# Patient Record
Sex: FEMALE | Race: WHITE | NOT HISPANIC OR LATINO | URBAN - METROPOLITAN AREA
[De-identification: names, ages, dates, MRNs, and addresses within clinical notes are randomized per-mention and may not be internally consistent; named-entity substitution may affect disease eponyms.]

---

## 2017-04-05 ENCOUNTER — APPOINTMENT (OUTPATIENT)
Dept: LAB | Facility: CLINIC | Age: 57
End: 2017-04-05
Payer: COMMERCIAL

## 2017-04-05 ENCOUNTER — TRANSCRIBE ORDERS (OUTPATIENT)
Dept: LAB | Facility: CLINIC | Age: 57
End: 2017-04-05

## 2017-04-05 DIAGNOSIS — N95.9 UNSPECIFIED MENOPAUSAL AND POSTMENOPAUSAL DISORDER: Primary | ICD-10-CM

## 2017-04-05 DIAGNOSIS — N95.9 UNSPECIFIED MENOPAUSAL AND POSTMENOPAUSAL DISORDER: ICD-10-CM

## 2017-04-05 LAB
ESTRADIOL SERPL-MCNC: 11 PG/ML
PROGEST SERPL-MCNC: 0.5 NG/ML
T4 FREE SERPL-MCNC: 1.04 NG/DL (ref 0.76–1.46)
TSH SERPL DL<=0.05 MIU/L-ACNC: 1.82 UIU/ML (ref 0.36–3.74)

## 2017-04-05 PROCEDURE — 84144 ASSAY OF PROGESTERONE: CPT

## 2017-04-05 PROCEDURE — 84402 ASSAY OF FREE TESTOSTERONE: CPT

## 2017-04-05 PROCEDURE — 84403 ASSAY OF TOTAL TESTOSTERONE: CPT

## 2017-04-05 PROCEDURE — 82677 ASSAY OF ESTRIOL: CPT

## 2017-04-05 PROCEDURE — 84439 ASSAY OF FREE THYROXINE: CPT

## 2017-04-05 PROCEDURE — 84443 ASSAY THYROID STIM HORMONE: CPT

## 2017-04-05 PROCEDURE — 36415 COLL VENOUS BLD VENIPUNCTURE: CPT

## 2017-04-05 PROCEDURE — 82670 ASSAY OF TOTAL ESTRADIOL: CPT

## 2017-04-06 LAB
TESTOST FREE SERPL-MCNC: 1.1 PG/ML (ref 0–4.2)
TESTOST SERPL-MCNC: 12 NG/DL (ref 3–41)

## 2017-04-07 LAB — ESTRIOL SERPL-MCNC: <0.1 NG/ML

## 2017-07-03 ENCOUNTER — GENERIC CONVERSION - ENCOUNTER (OUTPATIENT)
Dept: OTHER | Facility: OTHER | Age: 57
End: 2017-07-03

## 2017-07-03 ENCOUNTER — ALLSCRIPTS OFFICE VISIT (OUTPATIENT)
Dept: OTHER | Facility: OTHER | Age: 57
End: 2017-07-03

## 2017-07-10 ENCOUNTER — GENERIC CONVERSION - ENCOUNTER (OUTPATIENT)
Dept: OTHER | Facility: OTHER | Age: 57
End: 2017-07-10

## 2017-07-13 ENCOUNTER — GENERIC CONVERSION - ENCOUNTER (OUTPATIENT)
Dept: OTHER | Facility: OTHER | Age: 57
End: 2017-07-13

## 2017-07-17 ENCOUNTER — APPOINTMENT (OUTPATIENT)
Dept: LAB | Facility: CLINIC | Age: 57
End: 2017-07-17
Payer: COMMERCIAL

## 2017-07-17 DIAGNOSIS — Z00.8 HEALTH EXAMINATION IN POPULATION SURVEY: Primary | ICD-10-CM

## 2017-07-17 LAB
CHOLEST SERPL-MCNC: 225 MG/DL (ref 50–200)
EST. AVERAGE GLUCOSE BLD GHB EST-MCNC: 114 MG/DL
HBA1C MFR BLD: 5.6 % (ref 4.2–6.3)
HDLC SERPL-MCNC: 103 MG/DL (ref 40–60)
LDLC SERPL CALC-MCNC: 112 MG/DL (ref 0–100)
TRIGL SERPL-MCNC: 49 MG/DL

## 2017-07-17 PROCEDURE — 36415 COLL VENOUS BLD VENIPUNCTURE: CPT

## 2017-07-17 PROCEDURE — 83036 HEMOGLOBIN GLYCOSYLATED A1C: CPT

## 2017-07-17 PROCEDURE — 80061 LIPID PANEL: CPT

## 2017-08-04 ENCOUNTER — ALLSCRIPTS OFFICE VISIT (OUTPATIENT)
Dept: OTHER | Facility: OTHER | Age: 57
End: 2017-08-04

## 2017-09-06 ENCOUNTER — ALLSCRIPTS OFFICE VISIT (OUTPATIENT)
Dept: OTHER | Facility: OTHER | Age: 57
End: 2017-09-06

## 2017-09-26 ENCOUNTER — ALLSCRIPTS OFFICE VISIT (OUTPATIENT)
Dept: OTHER | Facility: OTHER | Age: 57
End: 2017-09-26

## 2017-10-10 ENCOUNTER — ALLSCRIPTS OFFICE VISIT (OUTPATIENT)
Dept: OTHER | Facility: OTHER | Age: 57
End: 2017-10-10

## 2017-10-24 ENCOUNTER — ALLSCRIPTS OFFICE VISIT (OUTPATIENT)
Dept: OTHER | Facility: OTHER | Age: 57
End: 2017-10-24

## 2017-11-07 ENCOUNTER — ALLSCRIPTS OFFICE VISIT (OUTPATIENT)
Dept: OTHER | Facility: OTHER | Age: 57
End: 2017-11-07

## 2017-11-21 ENCOUNTER — ALLSCRIPTS OFFICE VISIT (OUTPATIENT)
Dept: OTHER | Facility: OTHER | Age: 57
End: 2017-11-21

## 2018-01-05 ENCOUNTER — ALLSCRIPTS OFFICE VISIT (OUTPATIENT)
Dept: OTHER | Facility: OTHER | Age: 58
End: 2018-01-05

## 2018-01-10 NOTE — PSYCH
Progress Note  Psychotherapy Provided [facility]: Individual Psychotherapy 45 minutes provided today  Goals addressed in session:   Goals: Dealing with stress  D- Timothy Millan stated that she continues to deal with depression and often finds it difficult to function  Processing her emotions and discussing the trigger for the depression  Discussing ways for her to address the triggers and express what she is feeling  Also working on identifying her wants and needs and increasing self care  Giving supportive therapy  A- Progress - Continuing to process her emotions  P-Continue treatment       Pain Scale and Suicide Risk [facility]: Current Pain Assessment: no pain   On a scale of 0 to 10, the patient rates current pain at 0   Behavioral Health Treatment Plan [employer]: Diagnosis and Treatment Plan explained to patient, patient relates understanding diagnosis and is agreeable to Treatment Plan  Assessment    1   MDD (major depressive disorder), recurrent severe, without psychosis (296 33) (F33 2)    Signatures   Electronically signed by : Phani Watson LCSW; Oct 10 2017  2:04PM EST                       (Author)

## 2018-01-10 NOTE — PSYCH
Behavioral Health Outpatient Intake    Referred By: [facility]  Intake Questions: there are no developmental disabilities  the patient does not have a hearing impairment  the patient does not have an ICM or CTT  patient is not taking injectable psychiatric medications  Employment: The patient is employed full time at [employer]  Emergency Contact Information:   Emergency Contact: EVELIN   Relationship to Patient: FRANDY   Previous Psychiatric Treatment: She has not been previously seen by a psychiatrist     She has not been previously seen by a therapist     History: no  service  She has not had combat service  She was not activated into federal active duty as a member of the [employer] or [employer]  Insurance Subscriber: Bonnie Scott   Employer: [employer]   Primary Insurance: [employer]   ID number: [id number]   Other Insurance Information: PT # [id number]         Presenting Problem (in patient's words): DEPRESSION  Accepted as Patient   Crystal Garcia 7/3/17 3PM     Primary Care Physician: DR Astrid Kim   Electronically signed by : Ricardo Rubi, ; Jul  3 2017  9:20AM EST                       (Author)

## 2018-01-10 NOTE — PSYCH
Treatment Plan Tracking    #1 Treatment Plan not completed within required time limits due to: Client did not schedule an appointment within 15 days of initial assessment  , Client presented with emotional/behavioral issues that required clinical intervention            Signatures   Electronically signed by : Johnna Roman LCSW; Sep 26 2017  3:46PM EST                       (Author)

## 2018-01-11 NOTE — PSYCH
Progress Note  Psychotherapy Provided [facility]: Individual Psychotherapy 45 minutes provided today  Goals addressed in session:   Goals: Dealing with stress  D- Earl Franco stated that she has been feeling a large amount of stress due her daughter's recent wedding  She stated that she hadn't been feeling well that day and was also feeling a large amount of anxiety  She developed a migraine which led to her missing the last portion of the reception  Processing her emotions and discussing ways for her to cope with this  Also discussing ways to verbalize her expectations as well as her emotions to others  Giving supportive therapy  A- Progress - Processing her emotions  P-Continue treatment       Pain Scale and Suicide Risk [facility]: Current Pain Assessment: no pain   On a scale of 0 to 10, the patient rates current pain at 0   Behavioral Health Treatment Plan [facility]: Diagnosis and Treatment Plan explained to patient, patient relates understanding diagnosis and is agreeable to Treatment Plan  Assessment    1   MDD (major depressive disorder), recurrent severe, without psychosis (296 33) (F33 2)    Signatures   Electronically signed by : Mariela Guzman LCSW; Sep 26 2017  3:46PM EST                       (Author)

## 2018-01-12 NOTE — PSYCH
Treatment Plan Tracking    #1 Treatment Plan not completed within required time limits due to: Client presented with emotional/behavioral issues that required clinical intervention            Signatures   Electronically signed by : Pee Garcia LCSW; Oct 10 2017  2:04PM EST                       (Author)

## 2018-01-13 NOTE — PSYCH
Psych Med Mgmt    Appearance: was calm and cooperative, adequate hygiene and grooming and good eye contact  Observed mood: depressed and anxious  Observed mood: affect was constricted  Speech: a normal rate and fluent  Thought processes: coherent/organized  Hallucinations: no hallucinations present  Thought Content: no delusions  Abnormal Thoughts: The patient has no suicidal thoughts and no homicidal thoughts  Orientation: The patient is oriented to person, place and time, oriented to person, oriented to place and oriented to time  Recent and Remote Memory: short term memory intact and long term memory intact  Attention Span And Concentration: concentration intact  Insight: Limited insight  Judgment: Her judgment was limited  Muscle Strength And Tone  Muscle strength and tone were normal         Goals addressed in session: Medication Management       Treatment Recommendations: Continue current medications  Risks, Benefits And Possible Side Effects Of Medications: Risks, benefits, and possible side effects of medications explained to patient and patient verbalizes understanding  She reports normal appetite, normal energy level, no weight change and normal number of sleep hours  Patient stated she was able to tolerate Viibryd and feels less anxious and depressed  She stated that is looking forward to her daughter's wedding but also is grieving her as she will be moving out  She is aware she needs to overcome this feelings and that brighter future lies ahead but at the moment it is difficult to focus on that,  She also is left to deal with a lot of the resentment that she has towards her  and past affairs  She is going to meet with [facility] for counseling  Assessment    1  MDD (major depressive disorder), recurrent severe, without psychosis (296 33) (F33 2)   2  Major depressive disorder, single episode, severe without psychotic features (296 23)   (F32 2)    Plan    1  BuPROPion HCl ER (XL) 300 MG Oral Tablet Extended Release 24 Hour; TAKE 1   TABLET DAILY   2  LORazepam 0 5 MG Oral Tablet; TAKE 1 TABLET 3 TIMES DAILY AS NEEDED    3  Viibryd Starter Pack 10 & 20 MG Oral Kit    4  Viibryd 20 MG Oral Tablet; Take 1 tablet daily    Review of Systems    Constitutional: No fever, no chills, feels well, no tiredness, no recent weight gain or loss  Cardiovascular: no complaints of slow or fast heart rate, no chest pain, no palpitations  Respiratory: no complaints of shortness of breath, no wheezing, no dyspnea on exertion  Gastrointestinal: no complaints of abdominal pain, no constipation, no nausea, no diarrhea, no vomiting  Genitourinary: no complaints of dysuria, no incontinence, no pelvic pain, no urinary frequency  Musculoskeletal: no complaints of arthralgia, no myalgias, no limb pain, no joint stiffness  Integumentary: no complaints of skin rash, no itching, no dry skin  Neurological: no complaints of headache, no confusion, no numbness, no dizziness  Substance Abuse Hx    Substance Abuse History: Denies  Active Problems    1  Common migraine without aura (346 10) (G43 009)   2  Depression with anxiety (300 4) (F41 8)   3  Dry eyes, bilateral (375 15) (H04 123)   4  Hypercholesteremia (272 0) (E78 00)   5  Influenza vaccine needed (V04 81) (Z23)   6  Major depressive disorder, single episode, severe without psychotic features (296 23)   (F32 2)   7  MDD (major depressive disorder), recurrent severe, without psychosis (296 33) (F33 2)   8  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)    Past Medical History    The active problems and past medical history were reviewed and updated today  Allergies    1  No Known Drug Allergies    Current Meds   1  BuPROPion HCl ER (XL) 300 MG Oral Tablet Extended Release 24 Hour; TAKE 1   TABLET DAILY; Therapy: 51UIP7740 to (Evaluate:19Oct2017)  Requested for: 32Oew2689; Last   Rx:12Zxt5990 Ordered   2   LORazepam 0 5 MG Oral Tablet; TAKE 1 TABLET 3 TIMES DAILY AS NEEDED; Therapy: 61UIN7877 to (Evaluate:05Oct2017); Last Rx:50Ija2998 Ordered   3  Viibryd Starter Pack 10 & 20 MG Oral Kit; take as directed; Therapy: 21FWB9779 to (Evaluate:51Eaz9594)  Requested for: 71Kcl6948; Last   Rx:58Tbv0361 Ordered    The medication list was reviewed and updated today  Family Psych History  Mother    1  Family history of Benign essential HTN   2  Family history of hypercholesterolemia (V18 19) (Z83 42)   3  History of total knee arthroplasty  Father    4  Family history of Benign essential HTN   5  Family history of BPH (benign prostatic hyperplasia)   6  Family history of hypercholesterolemia (V18 19) (Z83 42)   7  Family history of multiple myeloma (V16 7) (Z80 7)   8  History of total knee arthroplasty    The family history was reviewed and updated today  Social History  The social history was reviewed and updated today  The social history was reviewed and is unchanged  End of Encounter Meds    1  BuPROPion HCl ER (XL) 300 MG Oral Tablet Extended Release 24 Hour; TAKE 1   TABLET DAILY; Therapy: 20ILY2570 to (Maryetta Apley)  Requested for: 18LIY5959; Last   Rx:61Vod0120 Ordered   2  LORazepam 0 5 MG Oral Tablet; TAKE 1 TABLET 3 TIMES DAILY AS NEEDED; Therapy: 30LGO2311 to (Evaluate:05Oct2017); Last Rx:83Nrj6554 Ordered    3  Viibryd 20 MG Oral Tablet; Take 1 tablet daily;    Therapy: 71KZV1561 to [phone])  Requested for: 59HJY1052; Last   Rx:08Cau0958 Ordered    Future Appointments    Date/Time Provider Specialty Site   09/26/2017 10:00 AM Nighat Martin LCSW Psychiatry [facility]   10/10/2017 01:00 PM Yahaira Cloud Psychiatry [facility]   10/24/2017 01:00 PM Nighat Martin LCSW Psychiatry [facility]   11/07/2017 11:00 AM Nighat Martin [facility] Psychiatry [facility]   11/21/2017 11:00 AM Tisha Lawrence Kristen Kelley, [facility] Psychiatry [facility]     Signatures   Electronically signed by : MICHEAL Murdock ; Sep  6 2017  4:19PM EST                       (Author)

## 2018-01-14 NOTE — PSYCH
Treatment Plan Tracking    #1 Treatment Plan not completed within required time limits due to: Client presented with emotional/behavioral issues that required clinical intervention            Signatures   Electronically signed by : Teresa Harrison LCSW; Nov 24 2017 11:19AM EST                       (Author)

## 2018-01-14 NOTE — PSYCH
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)    Current Meds   1  Citalopram Hydrobromide 20 MG Oral Tablet; TAKE 1 TABLET BY MOUTH ONCE DAILY; Therapy: 51VGE8873 to (Last Rx:64Epn2940)  Requested for: 09Odr9677 Ordered   2  Dexilant 60 MG Oral Capsule Delayed Release; take 1 capsule daily; Therapy: 74ZKE7628 to (Fabienne Moore)  Requested for: 85WIO1773; Last   Rx:47Ylv6789 Ordered   3  LORazepam 0 5 MG Oral Tablet; TAKE 1 TABLET 3 TIMES DAILY AS NEEDED; Therapy: 97IRO8790 to (Evaluate:05Oct2017); Last Rx:17Epv3511 Ordered   4  Relpax 40 MG Oral Tablet; TAKE 1 TABLET AT ONSET OF MIGRAINE  MAY REPEAT IN  2   HOURS  MAX 2 DOSES/24 HOURS, NO MORE THAN 3 DAYS PER WEEK ;   Therapy: 94TIA2886 to (Last PJ:50QFE2316)  Requested for: 37Bdp5021 Ordered   5  Restasis 0 05 % Ophthalmic Emulsion; INSTILL 1 DROP IN BOTH EYES EVERY 12   HOURS DAILY; Therapy: 03EFP2800 to (Evaluate:28Apr2017)  Requested for: 67WQC6535; Last   Rx:86Fle6381 Ordered   6  SUMAtriptan Succinate 100 MG Oral Tablet; TAKE 1 TABLET FOR MIGRAINE RELIEF  MAY   REPEAT 2 HOURS LATER  MAXIMUM 200MG/DAY; Therapy: 06BFM3510 to (Last Rx:19Kyi9864)  Requested for: 73Coy2446 Ordered   7  SUMAtriptan Succinate 6 MG/0 5ML Subcutaneous Solution; INJECT 0 5ML AT ONSET   OF HEADACHE  MAY REPEAT IN 2 HOURS  MAXIMUM OF 2 INJECTIONS PER   DAY, NO MORE THAN 3 DAYS PER WEEK; Therapy: 82NCB7240 to (Last Rx:69Rwq8739)  Requested for: 31Dsi3172 Ordered   8  Trintellix 10 MG Oral Tablet; Take 1 tablet daily; Therapy: 55IVA3629 to (Evaluate:30Oct2017)  Requested for: 55ZJG5177; Last   Rx:68Kvd6035; Status: ACTIVE - Retrospective By Protocol Authorization Ordered    Allergies    1   No Known Drug Allergies    Signatures   Electronically signed by : Pete Rodney LCSW; Jul 13 2017 10:58AM EST                       (Author)

## 2018-01-15 NOTE — PSYCH
Treatment Plan Tracking    #1 Treatment Plan not completed within required time limits due to: Client presented with emotional/behavioral issues that required clinical intervention            Signatures   Electronically signed by : Myles Loaiza LCSW; Oct 26 2017  3:56PM EST                       (Author)

## 2018-01-15 NOTE — PSYCH
Progress Note  Psychotherapy Provided [facility]: Individual Psychotherapy 45 minutes provided today  Goals addressed in session:   Goals: Dealing with stress  D- Thelma Carroll stated that she continues to struggle with depression  She shared that she has been feeling overwhelmed with work and her responsibilities at home  Discussing her relationship with her  how the issues between them have affected her  Continuing to explore her individuality as well as role within the relationship  Discussing ways for her to be able to identify her individuality and increase her ability to assert herself within the relationship  Giving supportive therapy  A- PRogress - Continuing to process her emotions  P-Continue treatment       Pain Scale and Suicide Risk [facility]: Current Pain Assessment: no pain   On a scale of 0 to 10, the patient rates current pain at 0   Behavioral Health Treatment Plan [facility]: Diagnosis and Treatment Plan explained to patient, patient relates understanding diagnosis and is agreeable to Treatment Plan  Assessment    1   MDD (major depressive disorder), recurrent severe, without psychosis (296 33) (F33 2)    Signatures   Electronically signed by : Rosa Johnston LCSW; Oct 26 2017  3:56PM EST                       (Author)

## 2018-01-16 NOTE — PSYCH
Assessment    1  Major depressive disorder, single episode, severe without psychotic features (296 23)   (F32 2)    Plan    1  Viibryd Starter Pack 10 & 20 MG Oral Kit; take as directed    Chief Complaint  Referred By PCP      History of Present Illness  61 yo  female with hx MDD for the past 15 years and treated with Citalopram 20 mg daily  This tx worked for a long time but gradually she started becoming depressed (withdrawn,hopeless, anhedonia, helpless,crying spells and sadness) She stated it became harder to function and it she feels now her world is around her job  She has 3 children, one moved to [place] the other is getting  and her son is 22 yo  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    Stopped Citalopram 7/3 Started Trintellix 10 mg 7/4 and she took it for 2 weeks and then switched to Wellbutrin  mg bid and eventually switched to Wellbutrin  mg and she is still depressed and tearful and feels it's hard to breath  She stated her  is not emotionally supportive  She wants to feel better by September 17 for her daughter's wedding  She lacks support as she won't share her feelings with her children and her mother lives downstairs apartment  She has a sister in [employer] [place] and they talked often  She stated nothing marked her first episode but rather years of melancholy  She has been  for 27 years  She stated she had to settle because she was already 34 and all her friends were   She stated 6 months into her marriage she discovered her  was abusing cocaine and risked his medical license  She had fertility problems and she went through treatment and had twin girls  After that she found out  had relapsed on cocaine  Later on she had a son and shortly after she found out her  had an affair and one night stand  Review of Systems  anxiety and interpersonal relationship problems     Constitutional: No fever, no chills, no recent weight gain or recent weight loss  ENT: no ear ache, no loss of hearing, no nosebleeds or nasal discharge, no sore throat or hoarseness  Cardiovascular: no complaints of slow or fast heart rate, no chest pain, no palpitations, no leg claudication or lower extremity edema  Respiratory: no complaints of shortness of breath, no wheezing, no dyspnea on exertion, no orthopnea or PND  Gastrointestinal: no complaints of abdominal pain, no constipation, no nausea or diarrhea, no vomiting, no bloody stools  Genitourinary: no complaints of dysuria, no incontinence, no pelvic pain, no dysmenorrhea, no vaginal discharge or abnormal vaginal bleeding  Musculoskeletal: no complaints of arthralgia, no myalgia, no joint swelling or stiffness, no limb pain or swelling  Integumentary: no complaints of skin rash or lesion, no itching or dry skin, no skin wounds  Neurological: no complaints of headache, no confusion, no numbness or tingling, no dizziness or fainting  ROS reviewed  Substance Abuse Hx    Substance Abuse History: Denies  Active Problems    1  Common migraine without aura (346 10) (G43 009)   2  Depression with anxiety (300 4) (F41 8)   3  Dry eyes, bilateral (375 15) (H04 123)   4  Hypercholesteremia (272 0) (E78 00)   5  Influenza vaccine needed (V04 81) (Z23)   6  Major depressive disorder, single episode, severe without psychotic features (296 23)   (F32 2)   7  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)    Past Medical History    The active problems and past medical history were reviewed and updated today  Surgical History    The surgical history was reviewed and updated today  Allergies    1  No Known Drug Allergies    Current Meds   1  BuPROPion HCl ER (XL) 300 MG Oral Tablet Extended Release 24 Hour; TAKE 1   TABLET DAILY; Therapy: 63UZF5963 to (Evaluate:21Qvi9272)  Requested for: 26Bad4083; Last   Rx:43Mcm9254 Ordered   2   LORazepam 0 5 MG Oral Tablet; TAKE 1 TABLET 3 TIMES DAILY AS NEEDED; Therapy: 62JYG2141 to (Evaluate:80Njr3258); Last Rx:49Erg2640 Ordered   3  Relpax 40 MG Oral Tablet; TAKE 1 TABLET AT ONSET OF MIGRAINE  MAY REPEAT IN  2   HOURS  MAX 2 DOSES/24 HOURS, NO MORE THAN 3 DAYS PER WEEK ;   Therapy: 21IXT9350 to (Last AC:12JTI1995)  Requested for: 87Avb6295 Ordered   4  SUMAtriptan Succinate 100 MG Oral Tablet; TAKE 1 TABLET FOR MIGRAINE RELIEF  MAY   REPEAT 2 HOURS LATER  MAXIMUM 200MG/DAY; Therapy: 96KOX9826 to (Last Rx:86Jlk8565)  Requested for: 36Iix5177 Ordered   5  SUMAtriptan Succinate 6 MG/0 5ML Subcutaneous Solution; INJECT 0 5ML AT ONSET   OF HEADACHE  MAY REPEAT IN 2 HOURS  MAXIMUM OF 2 INJECTIONS PER   DAY, NO MORE THAN 3 DAYS PER WEEK; Therapy: 10UEB2172 to (Last Rx:70Iry4127)  Requested for: 51Pze9353 Ordered    The medication list was reviewed and updated today  Family Psych History  Mother    1  Family history of Benign essential HTN   2  Family history of hypercholesterolemia (V18 19) (Z83 42)   3  History of total knee arthroplasty  Father    4  Family history of Benign essential HTN   5  Family history of BPH (benign prostatic hyperplasia)   6  Family history of hypercholesterolemia (V18 19) (Z83 42)   7  Family history of multiple myeloma (V16 7) (Z80 7)   8  History of total knee arthroplasty  No family history     The family history was reviewed and updated today  Social History  The social history was reviewed and updated today  The social history was reviewed and is unchanged  Worse since children moved out home  Feels she has no purpose  There is no intimacy in her   Physical Exam    Appearance: was calm and cooperative and adequate hygiene and grooming  Observed mood: mood appropriate  Observed mood: affect appropriate  Speech: a normal rate and fluent  Thought processes: coherent/organized  Hallucinations: no hallucinations present  Thought Content: no delusions  Abnormal Thoughts:  The patient has no suicidal thoughts and no homicidal thoughts  Orientation: The patient is oriented to person, place and time, oriented to person, oriented to place and oriented to time  Recent and Remote Memory: short term memory intact and long term memory intact  Attention Span And Concentration: concentration intact  Insight: Limited insight  Judgment: Her judgment was limited  Muscle Strength And Tone  Muscle strength and tone were normal    The patient is experiencing no localized pain  Initial Evaluation provided today  Treatment Recommendations: Continue Bupropion   Continue Lorazepam prn  Start Viibryd since weight is a great concern for her  Risks, Benefits And Possible Side Effects Of Medications: Risks, benefits, and possible side effects of medications explained to patient and patient verbalizes understanding  End of Encounter Meds    1  SUMAtriptan Succinate 100 MG Oral Tablet; TAKE 1 TABLET FOR MIGRAINE RELIEF  MAY   REPEAT 2 HOURS LATER  MAXIMUM 200MG/DAY; Therapy: 90QOA6189 to (Last Rx:52Xqs4427)  Requested for: 28Yhw1386 Ordered   2  SUMAtriptan Succinate 6 MG/0 5ML Subcutaneous Solution; INJECT 0 5ML AT ONSET   OF HEADACHE  MAY REPEAT IN 2 HOURS  MAXIMUM OF 2 INJECTIONS PER   DAY, NO MORE THAN 3 DAYS PER WEEK; Therapy: 70VRO3035 to (Last Rx:12Ahr5129)  Requested for: 73Swx7903 Ordered    3  BuPROPion HCl ER (XL) 300 MG Oral Tablet Extended Release 24 Hour; TAKE 1   TABLET DAILY; Therapy: 76FNI2711 to (Evaluate:19Oct2017)  Requested for: 05Izf0254; Last   Rx:93Iho7458 Ordered   4  LORazepam 0 5 MG Oral Tablet; TAKE 1 TABLET 3 TIMES DAILY AS NEEDED; Therapy: 05SQA0094 to (Evaluate:05Oct2017); Last Rx:19Idn6119 Ordered    5  Relpax 40 MG Oral Tablet (Eletriptan Hydrobromide); TAKE 1 TABLET AT ONSET OF   MIGRAINE  MAY REPEAT IN  2 HOURS  MAX 2 DOSES/24 HOURS, NO MORE THAN 3   DAYS PER WEEK ;   Therapy: 16MPT2962 to (Last DE:86QXK9736)  Requested for: 64Mfc0780 Ordered    6   Viibryd [employer] 10 & 20 MG Oral Kit; take as directed; Therapy: 58HHY5873 to (Evaluate:21Zaa8198)  Requested for: 20Yuv5136; Last   Rx:14Iud5671 Ordered    Future Appointments    Date/Time Provider Specialty Site   08/16/2017 02:00 PM Moshe Nolasco, [facility] Psychiatry [facility]   09/26/2017 10:00 AM Moshe Nolasco, [facility] Psychiatry [facility]   10/10/2017 01:00 PM Moshe Gaurav, [facility] Psychiatry [facility]   10/24/2017 01:00 PM [place] Gaurav, [facility] Psychiatry [facility]   11/07/2017 11:00 AM Moshe Nolasco, [facility] Psychiatry [facility]   11/21/2017 11:00 AM Moshe Deep, [facility] Psychiatry [facility]   09/06/2017 04:00 PM MICHEAL Galvin   Psychiatry [place] [id number]     Signatures   Electronically signed by : MICHEAL Jacobsen ; Aug  4 2017 11:50AM EST                       (Author)

## 2018-01-16 NOTE — PSYCH
Treatment Plan Tracking    #1 Treatment Plan not completed within required time limits due to: Client presented with emotional/behavioral issues that required clinical intervention            Signatures   Electronically signed by : Young Longoria LCSW; Nov 8 2017  3:05PM EST                       (Author)

## 2018-01-17 NOTE — PSYCH
Progress Note  Psychotherapy Provided [facility]: Individual Psychotherapy 45 minutes provided today  Goals addressed in session:   Goals: Dealing with stress  D- Lisa Alexander stated that she is very upset due to the fact that her daughter just got engaged to a man the family dislikes  She shared that she feels that he controls her daughter and has turned her against them  Processing her emotions and discussing how to best navigate the situation  Discussing her options and also ways for her to communicate effectively with her daughter in a non confrontational manner  Giving supportive therapy  A- PRogress - Continuing to process her emotions  P-Continue treatment       Pain Scale and Suicide Risk [facility]: Current Pain Assessment: no pain   On a scale of 0 to 10, the patient rates current pain at 0   Behavioral Health Treatment Plan [facility]: Diagnosis and Treatment Plan explained to patient, patient relates understanding diagnosis and is agreeable to Treatment Plan  Assessment    1   MDD (major depressive disorder), recurrent severe, without psychosis (296 33) (F33 2)    Signatures   Electronically signed by : Vasu Hernandez LCSW; Nov 8 2017  3:05PM EST                       (Author)

## 2018-01-17 NOTE — PSYCH
Progress Note  Psychotherapy Provided [facility]: Individual Psychotherapy 45 minutes provided today  Goals addressed in session:   Goals: Dealing with stress  D- Clotilde Cerna stated that she has been struggling with her daughter's recent engagement due to her disliking her fiance  In addition, she was very tearful in discussing the fact that her  had recently disclosed to their daughter that he had cheated on her and felt that she needed to know this to explain why Clotilde Cerna continues to struggle with depression  Processing her emotions and discussing ways for her to approach thia with her daughter  Also discussing ways for her to deal with her anger towards her   Giving supportive therapy  A- PRogress - Continuing to process her emotions  P-Continue treatment       Pain Scale and Suicide Risk [facility]: Current Pain Assessment: no pain   On a scale of 0 to 10, the patient rates current pain at 0   Behavioral Health Treatment Plan [facility]: Diagnosis and Treatment Plan explained to patient, patient relates understanding diagnosis and is agreeable to Treatment Plan  Assessment    1   MDD (major depressive disorder), recurrent severe, without psychosis (296 33) (F33 2)    Signatures   Electronically signed by : Teresa Harrison LCSW; Nov 24 2017 11:19AM EST                       (Author)

## 2018-01-18 NOTE — PSYCH
History of Present Illness    Pre-morbid level of function and History of Present Illness:  Tammy Woodard is a 62 Y O female referred fro treatment through her  who is [facility] physician for depression  She stated that she has been struggling with depression for many years due to various issues in her life  Reason for evaluation and partial hospitalization as an alternative to inpatient hospitalization: N/A  Previous Psychiatric/psychological treatment/year: Dayron Hernández (), More recent, LCSW in the [facility]  Outpatient and/or Partial and Other [employer] Used (CTT, ICM, VNA): Outpatient: Therapy  Family Constellation (include parents, relationship with each and pertinent Psych/Medical History): Mother: Lives with Tammy Woodard and her    Spouse: Hayden-    Father:    Children: Twin daughters-23   Children: Son -23   The patient relates best to Children  Domestic Violence: No past history of domestic violence  The patient is not currently experiencing domestic violence  There is not suspected domestic violence  There is no history of child abuse  There is no history of sexual abuse  Additional Comments related to family/relationships/peer support: Tammy Woodard reports having been very close to her parents, her father who is now  was a   She has three children, twin daughters and a son  Her mother currently lives with her and her   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[employer], RN  Her highest grade level achieved was  four years of college   history includes N/A  Financial status includes Stable  LEISURE ASSESSMENT (Include past and present hobbies/interests and level of involvement (Ex: Group/Club Affiliations): Enjoys time with her children  Her primary language is  [place]  Ethnic considerations are   Religions affiliations and level of involvement - Restorationism -very involved    Spirituality and michelle have helped her cope with difficult situations in her life  The patient learns best by  listening, demonstration and pictures  SUBSTANCE ABUSE ASSESSMENT: no current substance abuse and no past substance abuse  HEALTH ASSESSMENT: She has not lost 10 lbs or more in the last 6 months without trying  She has not had decreased appetite for 5 days or more  She has not gained 10 lbs or more in the last 6 months without trying  no nausea  no vomiting  no diarrhea  no referral to PCP needed  no referral to nutritionist needed  no pregnancy  She is not receiving prenatal care  not referred to PCP  Current PCP: Her   PCP notified  LEGAL: No Mental Health Advance Directive or Power of  on file  She does not want an information packet about Mental Health Advance Directives  The following ratings are based on my interview(s) with Thor Organ  Risk of Harm to Self:   Demographic risk factors include , alaskan, or native [place]  Historical Risk Factors include: chronic psychiatric problems  Recent Specific Risk Factors include: diagnosis of depression  Risk of Harm to Others:      Review of Systems  depression, interpersonal relationship problems and sleep disturbances, but no anxiety, no euphoria, no emotional lability, no hostility, not suidical, no compulsive behavior, no impulsive behavior, no unusual behavior, no violent behavior, no disturbing or unusual thoughts, feelings, or sensations, no unreasonable or irrational fears, no magical thinking, not having fantasies, no emotional problems/concerns, normal functioning ability, no personality change and no character deficiency  Constitutional: No fever, no chills, feels well, no tiredness, no recent weight gain or weight loss  Eyes: No complaints of eye pain, no red eyes, no eyesight problems, no discharge, no dry eyes, no itching of eyes     ENT: no complaints of earache, no loss of hearing, no nose bleeds, no nasal discharge, no sore throat, no hoarseness  Cardiovascular: No complaints of slow heart rate, no fast heart rate, no chest pain, no palpitations, no leg claudication, no lower extremity edema  Respiratory: No complaints of shortness of breath, no wheezing, no cough, no SOB on exertion, no orthopnea, no PND  Gastrointestinal: No complaints of abdominal pain, no constipation, no nausea or vomiting, no diarrhea, no bloody stools  Genitourinary: No complaints of dysuria, no incontinence, no pelvic pain, no dysmenorrhea, no vaginal discharge or bleeding  Musculoskeletal: No complaints of arthralgias, no myalgias, no joint swelling or stiffness, no limb pain or swelling  Integumentary: No complaints of skin rash or lesions, no itching, no skin wounds, no breast pain or lump  Neurological: No complaints of headache, no confusion, no convulsions, no numbness, no dizziness or fainting, no tingling, no limb weakness, no difficulty walking  Psychiatric: as noted in HPI  Endocrine: No complaints of proptosis, no hot flashes, no muscle weakness, no deepening of the voice, no feelings of weakness  Hematologic/Lymphatic: No complaints of swollen glands, no swollen glands in the neck, does not bleed easily, does not bruise easily  ROS reviewed  Active Problems    1  Common migraine without aura (346 10) (G43 009)   2  Depression with anxiety (300 4) (F41 8)   3  Dry eyes, bilateral (375 15) (H04 123)   4  Hypercholesteremia (272 0) (E78 00)   5  Influenza vaccine needed (V04 81) (Z23)   6  Major depressive disorder, single episode, severe without psychotic features (296 23)   (F32 2)   7  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)    Past Medical History    The active problems and past medical history were reviewed and updated today  Surgical History    The surgical history was reviewed and updated today  Family Psych History  Mother    1  Family history of Benign essential HTN   2   Family history of hypercholesterolemia (V18 19) (Z83 42)   3  History of total knee arthroplasty  Father    4  Family history of Benign essential HTN   5  Family history of BPH (benign prostatic hyperplasia)   6  Family history of hypercholesterolemia (V18 19) (Z83 42)   7  Family history of multiple myeloma (V16 7) (Z80 7)   8  History of total knee arthroplasty    The family history was reviewed and updated today  Social History  The social history was reviewed and updated today  The social history was reviewed and is unchanged  Current Meds   1  Citalopram Hydrobromide 20 MG Oral Tablet; TAKE 1 TABLET BY MOUTH ONCE DAILY; Therapy: 57OEN9497 to (Last Rx:44Dft1277)  Requested for: 79Weg2989 Ordered   2  Dexilant 60 MG Oral Capsule Delayed Release; take 1 capsule daily; Therapy: 69EWY3152 to (Diaz Mejias)  Requested for: 97MMP4441; Last   Rx:76Vgl0189 Ordered   3  Relpax 40 MG Oral Tablet; TAKE 1 TABLET AT ONSET OF MIGRAINE  MAY REPEAT IN  2   HOURS  MAX 2 DOSES/24 HOURS, NO MORE THAN 3 DAYS PER WEEK ;   Therapy: 62BBF2947 to (Last [place]:63ORE2744)  Requested for: 53Rgo3426 Ordered   4  Restasis 0 05 % Ophthalmic Emulsion; INSTILL 1 DROP IN BOTH EYES EVERY 12   HOURS DAILY; Therapy: 53SHH6880 to (Evaluate:28Apr2017)  Requested for: 83MAX7019; Last   Rx:[id number] Ordered   5  SUMAtriptan Succinate 100 MG Oral Tablet; TAKE 1 TABLET FOR MIGRAINE RELIEF  MAY   REPEAT 2 HOURS LATER  MAXIMUM 200MG/DAY; Therapy: 86DEQ8367 to (Last Rx:79Zgg0785)  Requested for: 88Ejv7470 Ordered   6  SUMAtriptan Succinate 6 MG/0 5ML Subcutaneous Solution; INJECT 0 5ML AT ONSET   OF HEADACHE  MAY REPEAT IN 2 HOURS  MAXIMUM OF 2 INJECTIONS PER   DAY, NO MORE THAN 3 DAYS PER WEEK; Therapy: 42AMC8236 to (Last Rx:66Ovm8253)  Requested for: 94Ywc8508 Ordered   7  Trintellix 10 MG Oral Tablet; Take 1 tablet daily;    Therapy: 82ODZ2429 to 24 525349)  Requested for: 90KEK2087; Last   Rx:[id number]; Status: ACTIVE - Retrospective By Protocol Authorization Ordered    The medication list was reviewed and updated today  Allergies    1  No Known Drug Allergies    Physical Exam    Appearance: was calm and cooperative, adequate hygiene and grooming and good eye contact  Observed mood: depressed  Observed mood: affect was tearful  Speech: a normal rate  Thought processes: coherent/organized  Hallucinations: no hallucinations present  Thought Content: no delusions  Abnormal Thoughts: The patient has no suicidal thoughts and no homicidal thoughts  Orientation: The patient is oriented to person, place and time  Recent and Remote Memory: short term memory intact and long term memory intact  Attention Span And Concentration: concentration intact  Insight: Insight intact  Judgment: Her judgment was intact  Muscle Strength And Tone  Muscle strength and tone were normal  Normal gait and station  Fund of knowledge: Patient displays adequate knowledge of current events, adequate fund of knowledge regarding past history and adequate fund of knowledge regarding vocabulary  The patient is experiencing no localized pain  On a scale of 0 - 10 the pain severity is a 0  DSM    Provisional Diagnosis: Major depressive disorder  Deferred  Good health  Moderate    Assessment    1   Major depressive disorder, single episode, severe without psychotic features (296 23)   (F32 2)    Future Appointments    Date/Time Provider Specialty Site   08/16/2017 02:00 PM Trinity Marks [facility]   09/26/2017 10:00 AM Trinity Marks, [facility]   10/10/2017 01:00 PM Trinity Marks, [facility]   10/24/2017 01:00 PM Trinity Marks, [facility]   11/07/2017 11:00 AM Trinity Marks, [facility]   11/21/2017 11:00 AM Jayson Lucas, [facility]     Signatures   Electronically signed by : Uri Tamayo LCSW; Jul 5 2017  5:31PM EST                       (Author)    Electronically signed by : MICHEAL Vieyra ; Jul 6 2017  9:05AM EST                       (Author)

## 2018-01-23 NOTE — PSYCH
Progress Note  Psychotherapy Provided [facility]: Individual Psychotherapy 45 minutes provided today  Goals addressed in session:   Goals: Dealing with stress  Wolf Gonzalez presented as upset and tearful  She stated that things have been very stressful and difficult for her  She stated that her father in law is in the hospital and that her mother in law is staying with them  In addition, her daughter is engaged to a man that they dislike and is adamant about remaining with him  She shared that she feels physically and emotionally overwhelmed and depressed  She denies SI  Processing her emotions and discussing ways for her to be able to practice self care and set boundaries with her family  Also discussing being able to delegate tasks and not take on everything herself  Giving supportive therapy  A- progress - Continuing to process her emotions  P-Continue treatment       Pain Scale and Suicide Risk [facility]: Current Pain Assessment: no pain   On a scale of 0 to 10, the patient rates current pain at 0   Behavioral Health Treatment Plan [facility]: Diagnosis and Treatment Plan explained to patient, patient relates understanding diagnosis and is agreeable to Treatment Plan  Assessment    1   MDD (major depressive disorder), recurrent severe, without psychosis (296 33) (F33 2)    Signatures   Electronically signed by : Mariela Guzman LCSW; Jan 9 2018  8:30AM EST                       (Author)

## 2018-02-12 ENCOUNTER — OFFICE VISIT (OUTPATIENT)
Dept: BEHAVIORAL/MENTAL HEALTH CLINIC | Facility: CLINIC | Age: 58
End: 2018-02-12
Payer: COMMERCIAL

## 2018-02-12 DIAGNOSIS — F33.2 SEVERE RECURRENT MAJOR DEPRESSION WITHOUT PSYCHOTIC FEATURES (HCC): Primary | ICD-10-CM

## 2018-02-12 PROCEDURE — 90834 PSYTX W PT 45 MINUTES: CPT | Performed by: SOCIAL WORKER

## 2018-02-12 NOTE — PSYCH
Psychotherapy Provided: Individual Psychotherapy 45 minutes     Length of time in session: 45 minutes, follow up in 2 week    Goals addressed in session: Goal 1     Pain:      none    0    Current suicide risk : Jeff Nowak stated that things continue to be extremely stressful in her life  She shared that her father in [address] [place] [address] is in a maintenance state with hospice  In addition, her daughter now wants her to plan her wedding  Processing her emotions and discussing ways for her to cope with the stress in her life  Also discussing how to be able to accept other people's choices that she doesn't understand or agree with   Giving supportive there[ay  A- Progress - Continuing to process her emotion  P-Continue treatment    Behavioral Health Treatment Plan [facility]: Diagnosis and Treatment Plan explained to Preston Cerna relates understanding diagnosis and is agreeable to Treatment Plan   Yes

## 2018-02-12 NOTE — PSYCH
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, nurse    Diagnosis:   No diagnosis found  Area of Needs: Stress      Long Term Goal 1: Be able to deal with the stress in my life    Target Date:  Completion Date:          Short Term Objectives for Goal 1: ASet bioundaries with others, BUnderstand that I can't control other people's behaviors or choices and CTake time for myself and don't over-extend myself for others    Long Term Goal 2: N/A    Target Date: N/A  Completion Date: N/A    Short Term Objectives for Goal 2: N/A         Long Term Goal # 3: N/A     Target Date: N/A  Completion Date: TBD    Short Term Objectives for Goal 3: N/A    GOAL 1: Modality: Individual 1-2x per month   Completion Date TBD Person responsible: Josephine    GOAL 2: Modality: Individual 0x per month   Completion Date N/A     GOAL 3: Modality: Individual 0x per month   Completion Date N/A      Behavioral Health Treatment Plan [facility]: Diagnosis and Treatment Plan explained to Juan Ribeiro relates understanding diagnosis and is agreeable to Treatment Plan         Client Comments : Please share your thoughts, feelings, need and/or experiences regarding your treatment plan:       __________________________________________________________________    __________________________________________________________________    __________________________________________________________________    __________________________________________________________________    _______________________________________                Patient signature, Date Time: __________________________________________             Physician cosigner signature, Date, Time: ________________________________

## 2018-02-16 DIAGNOSIS — F32.A DEPRESSION, UNSPECIFIED DEPRESSION TYPE: Primary | ICD-10-CM

## 2018-02-16 DIAGNOSIS — F32.A DEPRESSION, UNSPECIFIED DEPRESSION TYPE: ICD-10-CM

## 2018-02-16 RX ORDER — VILAZODONE HYDROCHLORIDE 10 MG/1
10 TABLET ORAL
Qty: 30 TABLET | Refills: 1 | Status: SHIPPED | OUTPATIENT
Start: 2018-02-16 | End: 2018-05-05 | Stop reason: DRUGHIGH

## 2018-02-16 RX ORDER — VILAZODONE HYDROCHLORIDE 20 MG/1
1 TABLET ORAL DAILY
COMMUNITY
Start: 2017-09-06 | End: 2018-02-16 | Stop reason: SDUPTHER

## 2018-02-16 RX ORDER — VILAZODONE HYDROCHLORIDE 20 MG/1
20 TABLET ORAL DAILY
Qty: 30 TABLET | Refills: 1 | Status: SHIPPED | OUTPATIENT
Start: 2018-02-16 | End: 2018-05-05 | Stop reason: SDUPTHER

## 2018-03-12 ENCOUNTER — OFFICE VISIT (OUTPATIENT)
Dept: BEHAVIORAL/MENTAL HEALTH CLINIC | Facility: CLINIC | Age: 58
End: 2018-03-12
Payer: COMMERCIAL

## 2018-03-12 DIAGNOSIS — F33.2 SEVERE RECURRENT MAJOR DEPRESSION WITHOUT PSYCHOTIC FEATURES (HCC): Primary | ICD-10-CM

## 2018-03-12 PROCEDURE — 90834 PSYTX W PT 45 MINUTES: CPT | Performed by: SOCIAL WORKER

## 2018-03-12 NOTE — PSYCH
Psychotherapy Provided: Individual Psychotherapy 45 minutes     Length of time in session: 45 minutes, follow up in 2 week    Goals addressed in session: Goal 1     Pain:      none    0    Current suicide risk : Low     D- Leopoldo Resendiz stated that she has been struggling with her daughter's engagement  She stated that she wants to get  in June  Leopoldo Figures upset with the short time frame as well as the fact that it will be in [place]  She also continues to dislike her daughter's fiance and is having difficulty being happy about the wedding  Processing her emotions and discussing ways for her to be able to deal with the marriage  Also continuing to discuss ways for her to be able to cope with the stress in her life  Giving supportive therapy  A- Progress - Continuing to work on use of coping mechanisms for stress  P-Continue treatment    [address]: Diagnosis and Treatment Plan explained to Sheryl Kim relates understanding diagnosis and is agreeable to Treatment Plan   Yes

## 2018-03-26 ENCOUNTER — TELEPHONE (OUTPATIENT)
Dept: BEHAVIORAL/MENTAL HEALTH CLINIC | Facility: CLINIC | Age: 58
End: 2018-03-26

## 2018-03-26 NOTE — TELEPHONE ENCOUNTER
Patient called wanting to make an appointment with you but couldn't be scheduled until 07/02  She says she needs to see you sooner and discuss her medications with you  Is there anywhere in your schedule we can fit her in sooner than that?

## 2018-05-05 DIAGNOSIS — F32.A DEPRESSION, UNSPECIFIED DEPRESSION TYPE: ICD-10-CM

## 2018-05-05 RX ORDER — VILAZODONE HYDROCHLORIDE 20 MG/1
20 TABLET ORAL DAILY
Qty: 90 TABLET | Refills: 1 | Status: SHIPPED | OUTPATIENT
Start: 2018-05-05 | End: 2018-07-02 | Stop reason: SDUPTHER

## 2018-05-09 DIAGNOSIS — G43.709 CHRONIC MIGRAINE WITHOUT AURA WITHOUT STATUS MIGRAINOSUS, NOT INTRACTABLE: Primary | ICD-10-CM

## 2018-05-09 RX ORDER — SUMATRIPTAN 100 MG/1
100 TABLET, FILM COATED ORAL AS NEEDED
Qty: 27 TABLET | Refills: 1 | Status: SHIPPED | OUTPATIENT
Start: 2018-05-09 | End: 2018-10-02 | Stop reason: SDUPTHER

## 2018-05-09 RX ORDER — BUPROPION HYDROCHLORIDE 300 MG/1
1 TABLET ORAL DAILY
COMMUNITY
Start: 2017-07-21 | End: 2018-06-15 | Stop reason: SDUPTHER

## 2018-05-09 RX ORDER — LORAZEPAM 0.5 MG/1
1 TABLET ORAL 3 TIMES DAILY PRN
COMMUNITY
Start: 2017-07-07 | End: 2018-07-02 | Stop reason: SDUPTHER

## 2018-05-10 DIAGNOSIS — L03.114 CELLULITIS OF ARM, LEFT: Primary | ICD-10-CM

## 2018-05-10 RX ORDER — SULFAMETHOXAZOLE AND TRIMETHOPRIM 800; 160 MG/1; MG/1
1 TABLET ORAL EVERY 12 HOURS SCHEDULED
Qty: 14 TABLET | Refills: 0 | Status: SHIPPED | OUTPATIENT
Start: 2018-05-10 | End: 2018-05-17

## 2018-05-14 ENCOUNTER — OFFICE VISIT (OUTPATIENT)
Dept: BEHAVIORAL/MENTAL HEALTH CLINIC | Facility: CLINIC | Age: 58
End: 2018-05-14
Payer: COMMERCIAL

## 2018-05-14 DIAGNOSIS — F33.2 MDD (MAJOR DEPRESSIVE DISORDER), RECURRENT SEVERE, WITHOUT PSYCHOSIS (HCC): Primary | ICD-10-CM

## 2018-05-14 PROCEDURE — 90834 PSYTX W PT 45 MINUTES: CPT | Performed by: SOCIAL WORKER

## 2018-05-14 NOTE — PSYCH
Psychotherapy Provided: Individual Psychotherapy 45 minutes     Length of time in session: 45 minutes, follow up in 2 week    Goals addressed in session: Goal 1     Pain:      none    0    Current suicide risk : Jet Wolfe stated that she continues to be upset with her daughters upcoming wedding  She stated that they intensely dislike her fiance and feel that he is manipulating and controlling her  Discussing her feeling regarding the situation as well as ways for her to be nereyda to accept her daughter's choices and be able to maintain a healthy relationship with her  Working on ways for her to cope with her dislike for her new son in law  Giving supportive therapy  A- Progress - Continuing to process her emotions  P-Continue treatment    [address]: Diagnosis and Treatment Plan explained to Caretha Precise relates understanding diagnosis and is agreeable to Treatment Plan   Yes

## 2018-06-13 ENCOUNTER — OFFICE VISIT (OUTPATIENT)
Dept: BEHAVIORAL/MENTAL HEALTH CLINIC | Facility: CLINIC | Age: 58
End: 2018-06-13
Payer: COMMERCIAL

## 2018-06-13 DIAGNOSIS — F33.2 MDD (MAJOR DEPRESSIVE DISORDER), RECURRENT SEVERE, WITHOUT PSYCHOSIS (HCC): Primary | ICD-10-CM

## 2018-06-13 PROCEDURE — 90834 PSYTX W PT 45 MINUTES: CPT | Performed by: SOCIAL WORKER

## 2018-06-13 NOTE — PSYCH
Treatment Plan Tracking    # 2Treatment Plan not completed within required time limits due to: Client presented with emotional/behavorial issues that required clinical intervention  Christy Lira

## 2018-06-13 NOTE — PSYCH
Psychotherapy Provided: Individual Psychotherapy 45 minutes     Length of time in session: 45 minutes, follow up in 1 month    Goals addressed in session: Goal 1     Pain:      none    0    Current suicide risk : Celina Barreto stated that she is extremely upset about her daughter's wedding next week  She stated that she continues to dislike her fiance and feels that she is making a mistake  Discussing the reasons for her dislike of him and the parallels to her own relationship  Discussing the triggers involved and ways for her to be able to accept and respect her daughter's choices  Working on ways for her to cope with her feelings towards her new son in law  Giving supportive therapy  A- Progress - Continuing to process her emotions  P-Continue treatment    [address]: Diagnosis and Treatment Plan explained to Tomer Baig relates understanding diagnosis and is agreeable to Treatment Plan   Yes

## 2018-06-15 DIAGNOSIS — F33.2 MDD (MAJOR DEPRESSIVE DISORDER), RECURRENT SEVERE, WITHOUT PSYCHOSIS (HCC): Primary | ICD-10-CM

## 2018-06-15 RX ORDER — BUPROPION HYDROCHLORIDE 300 MG/1
300 TABLET ORAL DAILY
Qty: 90 TABLET | Refills: 1 | Status: SHIPPED | OUTPATIENT
Start: 2018-06-15 | End: 2018-07-02 | Stop reason: SDUPTHER

## 2018-07-02 ENCOUNTER — OFFICE VISIT (OUTPATIENT)
Dept: PSYCHIATRY | Facility: CLINIC | Age: 58
End: 2018-07-02
Payer: COMMERCIAL

## 2018-07-02 DIAGNOSIS — F32.A DEPRESSION, UNSPECIFIED DEPRESSION TYPE: ICD-10-CM

## 2018-07-02 DIAGNOSIS — F33.2 MDD (MAJOR DEPRESSIVE DISORDER), RECURRENT SEVERE, WITHOUT PSYCHOSIS (HCC): ICD-10-CM

## 2018-07-02 PROCEDURE — 99213 OFFICE O/P EST LOW 20 MIN: CPT | Performed by: PSYCHIATRY & NEUROLOGY

## 2018-07-02 RX ORDER — LORAZEPAM 0.5 MG/1
1 TABLET ORAL 3 TIMES DAILY
COMMUNITY
End: 2018-07-02 | Stop reason: SDUPTHER

## 2018-07-02 RX ORDER — SUMATRIPTAN 100 MG/1
50 TABLET, FILM COATED ORAL DAILY
COMMUNITY
End: 2019-07-30 | Stop reason: SDUPTHER

## 2018-07-02 RX ORDER — ESTRADIOL 0.05 MG/D
1 PATCH TRANSDERMAL WEEKLY
COMMUNITY
End: 2018-10-02 | Stop reason: ALTCHOICE

## 2018-07-02 RX ORDER — LORAZEPAM 0.5 MG/1
0.5 TABLET ORAL 3 TIMES DAILY
Qty: 90 TABLET | Refills: 0 | Status: SHIPPED | OUTPATIENT
Start: 2018-07-02 | End: 2018-11-25 | Stop reason: SDUPTHER

## 2018-07-02 RX ORDER — BUPROPION HYDROCHLORIDE 300 MG/1
300 TABLET ORAL EVERY MORNING
Qty: 90 TABLET | Refills: 0 | Status: SHIPPED | OUTPATIENT
Start: 2018-07-02 | End: 2019-01-08 | Stop reason: SDUPTHER

## 2018-07-02 RX ORDER — VILAZODONE HYDROCHLORIDE 20 MG/1
20 TABLET ORAL DAILY
Qty: 90 TABLET | Refills: 0 | Status: SHIPPED | OUTPATIENT
Start: 2018-07-02 | End: 2018-10-02 | Stop reason: SDUPTHER

## 2018-07-02 NOTE — PSYCH
Subjective: Medication Management      Patient ID: Luis Eduardo Michelle is a 62 y o  female  HPI ROS Appetite Changes and Sleep: normal appetite, normal energy level, no weight change and normal number of sleep hours   Patient stated she has been meeting with her counselor regularly but she still deals with a lot of anxiety and dwelling on the past   Review Of Systems:     Mood Anxiety and Depression   Behavior Impulsive Behavior   Thought Content Disturbing Thoughts, Feelings and Unreasonalbe or Irrational Fears   General Relationship Problems, Emotional Problems, Sleep Disturbances and Decreased Functioning   Personality Normal   Other Psych Symptoms Normal   Constitutional Negative   ENT Negative   Cardiovascular Negative   Respiratory Negative   Gastrointestinal Negative   Genitourinary Negative   Musculoskeletal Negative   Integumentary Negative   Neurological Negative   Endocrine Normal    Other Symptoms Normal              Laboratory Results: No results found for this or any previous visit  Substance Abuse History:  History   Drug use: Unknown       Family Psychiatric History: No family history on file  The following portions of the patient's history were reviewed and updated as appropriate: allergies, current medications, past family history, past medical history, past social history, past surgical history and problem list     Social History     Social History    Marital status: /Civil Union     Spouse name: N/A    Number of children: N/A    Years of education: N/A     Occupational History    Not on file       Social History Main Topics    Smoking status: Not on file    Smokeless tobacco: Not on file    Alcohol use Not on file    Drug use: Unknown    Sexual activity: Not on file     Other Topics Concern    Not on file     Social History Narrative    No narrative on file     Social History     Social History Narrative    No narrative on file       Objective:       Mental status:  Appearance calm and cooperative , adequate hygiene and grooming and good eye contact    Mood depressed and anxious   Affect affect was constricted   Speech a normal rate   Thought Processes coherent/organized and normal thought processes   Hallucinations no hallucinations present    Thought Content no delusions   Abnormal Thoughts no suicidal thoughts  and no homicidal thoughts    Orientation  oriented to person and place and time   Remote Memory short term memory intact and long term memory intact   Attention Span concentration intact   Intellect Appears to be of Average Intelligence   Insight Limited insight   Judgement judgment was limited   Muscle Strength Muscle strength and tone were normal and Normal gait    Language no difficulty naming common objects, no difficulty repeating a phrase  and no difficulty writing a sentence    Fund of Knowledge displays adequate knowledge of current events, adequate fund of knowledge regarding past history and adequate fund of knowledge regarding vocabulary    Pain none   Pain Scale 0       Assessment/Plan:       Diagnoses and all orders for this visit:    MDD (major depressive disorder), recurrent severe, [facility] )  -     buPROPion (WELLBUTRIN XL) 300 mg 24 hr tablet; Take 1 tablet (300 mg total) by mouth every morning  -     LORazepam (ATIVAN) 0 5 mg tablet; Take 1 tablet (0 5 mg total) by mouth 3 (three) times a day    Depression, unspecified depression type  -     vilazodone (VIIBRYD) 20 mg tablet; Take 1 tablet (20 mg total) by mouth daily for 180 days    Other orders  -     estradiol (CLIMARA) 0 05 mg/24 hr; Take 1 patch by mouth once a week  -     SUMAtriptan (IMITREX) 100 mg tablet;  Take 50 mg by mouth Daily            Treatment Recommendations- Risks Benefits      Immediate Medical/Psychiatric/Psychotherapy Treatments and Any Precautions: continue current medications   Risks, Benefits And Possible Side Effects Of Medications:  {PSYCH RISK, BENEFITS AND POSSIBLE SIDE EFFECTS (Optional):51593    Controlled Medication Discussion: Discussed with patient Black Box warning on concurrent use of benzodiazepines and opioid medications including sedation, respiratory depression, coma and death  Patient understands the risk of treatment with benzodiazepines in addition to opioids and wants to continue taking those medications  , Discussed with patient the risks of sedation, respiratory depression, impairment of ability to drive and potential for abuse and addiction related to treatment with benzodiazepine medications  The patient understands risk of treatment with benzodiazepine medications, agrees to not drive if feels impaired and agrees to take medications as prescribed   and The patient has been filling controlled prescriptions on time as prescribed to [facility]  [facility]

## 2018-07-18 DIAGNOSIS — H83.8X9 AUTOIMMUNE DISORDER OF INNER EAR, UNSPECIFIED LATERALITY: Primary | ICD-10-CM

## 2018-07-18 RX ORDER — PREDNISONE 10 MG/1
40 TABLET ORAL DAILY
Qty: 70 TABLET | Refills: 1 | Status: SHIPPED | OUTPATIENT
Start: 2018-07-18 | End: 2018-08-15

## 2018-07-20 ENCOUNTER — APPOINTMENT (OUTPATIENT)
Dept: LAB | Facility: CLINIC | Age: 58
End: 2018-07-20

## 2018-07-20 DIAGNOSIS — Z00.8 HEALTH EXAMINATION IN POPULATION SURVEY: Primary | ICD-10-CM

## 2018-07-20 LAB
CHOLEST SERPL-MCNC: 183 MG/DL (ref 50–200)
EST. AVERAGE GLUCOSE BLD GHB EST-MCNC: 103 MG/DL
HBA1C MFR BLD: 5.2 % (ref 4.2–6.3)
HDLC SERPL-MCNC: 72 MG/DL (ref 40–60)
LDLC SERPL CALC-MCNC: 95 MG/DL (ref 0–100)
NONHDLC SERPL-MCNC: 111 MG/DL
TRIGL SERPL-MCNC: 79 MG/DL

## 2018-07-20 PROCEDURE — 83036 HEMOGLOBIN GLYCOSYLATED A1C: CPT

## 2018-07-20 PROCEDURE — 80061 LIPID PANEL: CPT

## 2018-07-20 PROCEDURE — 36415 COLL VENOUS BLD VENIPUNCTURE: CPT

## 2018-08-14 ENCOUNTER — OFFICE VISIT (OUTPATIENT)
Dept: BEHAVIORAL/MENTAL HEALTH CLINIC | Facility: CLINIC | Age: 58
End: 2018-08-14
Payer: COMMERCIAL

## 2018-08-14 DIAGNOSIS — F33.2 MDD (MAJOR DEPRESSIVE DISORDER), RECURRENT SEVERE, WITHOUT PSYCHOSIS (HCC): Primary | ICD-10-CM

## 2018-08-14 PROCEDURE — 90834 PSYTX W PT 45 MINUTES: CPT | Performed by: SOCIAL WORKER

## 2018-08-14 NOTE — PSYCH
Psychotherapy Provided: Individual Psychotherapy 45 minutes     Length of time in session: 45 minutes, follow up in 1 month    Goals addressed in session: Goal 1     Pain:      none    0    Current suicide risk : Bessy stated that her daughter's wedding was "horrible"  She stated that she cried through the entire ceremony  She also stated that her daughter's new  did not take the lead in planning her celebration for her white coat ceremony  Processing her emotions and discussing ways for her to cope with her daughter's choices even though she disagrees with them  Also discussing how her past is affecting her and ways for her to be able to move forward with her life  Reviewing and revising her treatment plan  Giving supportive therapy  A- Progress - Continuing to process her emotions  P- Continue treatment    Behavioral Health Treatment Plan [facility]: Diagnosis and Treatment Plan explained to Yulissa Hampton relates understanding diagnosis and is agreeable to Treatment Plan   Yes

## 2018-08-14 NOTE — PSYCH
Treatment Plan Tracking    # 2Treatment Plan not completed within required time limits due to: [place] unable to generate typed paper copy due to time constraint  PT will sign at the next session  Belinda Valadez

## 2018-09-19 ENCOUNTER — OFFICE VISIT (OUTPATIENT)
Dept: BEHAVIORAL/MENTAL HEALTH CLINIC | Facility: CLINIC | Age: 58
End: 2018-09-19
Payer: COMMERCIAL

## 2018-09-19 DIAGNOSIS — F33.2 MDD (MAJOR DEPRESSIVE DISORDER), RECURRENT SEVERE, WITHOUT PSYCHOSIS (HCC): Primary | ICD-10-CM

## 2018-09-19 PROCEDURE — 90834 PSYTX W PT 45 MINUTES: CPT | Performed by: SOCIAL WORKER

## 2018-09-19 NOTE — PSYCH
Psychotherapy Provided: Individual Psychotherapy 45 minutes     Length of time in session: 45 minutes, follow up in 1 month    Goals addressed in session: Goal 1     Pain:      none    0    Current suicide risk : Low     D- Jackelyn Magana presented as upset and tearful  She shared that she has been extremely unhappy and depressed  She denies SI  She stated that she is happy when she is at work because she feels supported there  She stated that she feels very lonely and unhappy at home because her children have all moved out and she and her  are not close  She stated that she dies not feel that she confide in him how she really feels because he does not understand  She also feels that he does not understand how his behavior earlier on in their marriage has affected her depression  Processing her emotions and discussing ways for her to start to identify her needs and ways to fulfill them  Also discussing ways for her to begin to communicate her feelings to her   Reviewing and signing her treatment plan  Giving supportive therapy  A- Progress - Continuing to process her emotions  P-Continue treatment    [address]: Diagnosis and Treatment Plan explained to Yulissa Hampton relates understanding diagnosis and is agreeable to Treatment Plan   Yes

## 2018-10-02 ENCOUNTER — OFFICE VISIT (OUTPATIENT)
Dept: PSYCHIATRY | Facility: CLINIC | Age: 58
End: 2018-10-02
Payer: COMMERCIAL

## 2018-10-02 DIAGNOSIS — F32.A DEPRESSION, UNSPECIFIED DEPRESSION TYPE: ICD-10-CM

## 2018-10-02 DIAGNOSIS — F33.2 MDD (MAJOR DEPRESSIVE DISORDER), RECURRENT SEVERE, WITHOUT PSYCHOSIS (HCC): ICD-10-CM

## 2018-10-02 PROCEDURE — 99213 OFFICE O/P EST LOW 20 MIN: CPT | Performed by: PSYCHIATRY & NEUROLOGY

## 2018-10-02 RX ORDER — VILAZODONE HYDROCHLORIDE 10 MG/1
10 TABLET ORAL
Qty: 90 TABLET | Refills: 0 | Status: SHIPPED | OUTPATIENT
Start: 2018-10-02 | End: 2019-01-08 | Stop reason: ALTCHOICE

## 2018-10-02 RX ORDER — VILAZODONE HYDROCHLORIDE 20 MG/1
20 TABLET ORAL DAILY
Qty: 90 TABLET | Refills: 0 | Status: SHIPPED | OUTPATIENT
Start: 2018-10-02 | End: 2019-01-08 | Stop reason: ALTCHOICE

## 2018-10-02 NOTE — PSYCH
Subjective: Medication Management      Patient ID: Jf Corona is a 62 y o  female  HPI ROS Appetite Changes and Sleep: normal appetite, decreased energy, no weight change and normal number of sleep hours   Patient stated she continues to struggle with depressed and anxious mood and the evening she seems to have more anxiety and difficulties managing her symptoms  Review Of Systems:     Mood Anxiety and Depression   Behavior Normal    Thought Content Normal   General Emotional Problems and Decreased Functioning   Personality Normal   Other Psych Symptoms Normal   Constitutional Negative   ENT Negative   Cardiovascular Negative   Respiratory Negative   Gastrointestinal Negative   Genitourinary Negative   Musculoskeletal Negative   Integumentary Negative   Neurological Negative   Endocrine Normal    Other Symptoms Normal              Laboratory Results: No results found for this or any previous visit  Substance Abuse History:  History   Drug use: Unknown       Family Psychiatric History: No family history on file  The following portions of the patient's history were reviewed and updated as appropriate: allergies, current medications, past family history, past medical history, past social history, past surgical history and problem list     Social History     Social History    Marital status: /Civil Union     Spouse name: N/A    Number of children: N/A    Years of education: N/A     Occupational History    Not on file       Social History Main Topics    Smoking status: Not on file    Smokeless tobacco: Not on file    Alcohol use Not on file    Drug use: Unknown    Sexual activity: Not on file     Other Topics Concern    Not on file     Social History Narrative    No narrative on file     Social History     Social History Narrative    No narrative on file       Objective:       Mental status:  Appearance calm and cooperative , adequate hygiene and grooming and good eye contact Mood depressed and anxious   Affect affect was constricted   Speech a normal rate and fluent   Thought Processes coherent/organized and normal thought processes   Hallucinations no hallucinations present    Thought Content no delusions   Abnormal Thoughts no suicidal thoughts  and no homicidal thoughts    Orientation  oriented to person and place and time   Remote Memory short term memory intact and long term memory intact   Attention Span concentration intact   Intellect Appears to be of Average Intelligence   Insight Limited insight   Judgement judgment was limited   Muscle Strength Muscle strength and tone were normal and Normal gait    Language no difficulty naming common objects, no difficulty repeating a phrase  and no difficulty writing a sentence    Fund of Knowledge displays adequate knowledge of current events, adequate fund of knowledge regarding past history and adequate fund of knowledge regarding vocabulary    Pain none   Pain Scale 0       Assessment/Plan:       Diagnoses and all orders for this visit:    MDD (major depressive disorder), recurrent severe, without psychosis ([facility] )  -     vilazodone (VIIBRYD) 10 mg tablet; Take 1 tablet (10 mg total) by mouth daily with breakfast Total daily dose of 30 mg    Depression, unspecified depression type  -     vilazodone (VIIBRYD) 20 mg tablet; Take 1 tablet (20 mg total) by mouth daily for 180 days            Treatment Recommendations- Risks Benefits      Immediate Medical/Psychiatric/Psychotherapy Treatments and Any Precautions: increase Viibryd to 30 mg daily     Risks, Benefits And Possible Side Effects Of Medications:  {PSYCH RISK, BENEFITS AND POSSIBLE SIDE EFFECTS (Optional):50469    Controlled Medication Discussion: Discussed with patient Black Box warning on concurrent use of benzodiazepines and opioid medications including sedation, respiratory depression, coma and death   Patient understands the risk of treatment with benzodiazepines in addition to opioids and wants to continue taking those medications  , Discussed with patient the risks of sedation, respiratory depression, impairment of ability to drive and potential for abuse and addiction related to treatment with benzodiazepine medications  The patient understands risk of treatment with benzodiazepine medications, agrees to not drive if feels impaired and agrees to take medications as prescribed   and The patient has been filling controlled prescriptions on time as prescribed to [facility]

## 2018-10-16 ENCOUNTER — OFFICE VISIT (OUTPATIENT)
Dept: BEHAVIORAL/MENTAL HEALTH CLINIC | Facility: CLINIC | Age: 58
End: 2018-10-16
Payer: COMMERCIAL

## 2018-10-16 DIAGNOSIS — F33.2 MDD (MAJOR DEPRESSIVE DISORDER), RECURRENT SEVERE, WITHOUT PSYCHOSIS (HCC): Primary | ICD-10-CM

## 2018-10-16 PROCEDURE — 90834 PSYTX W PT 45 MINUTES: CPT | Performed by: SOCIAL WORKER

## 2018-10-16 NOTE — PSYCH
Psychotherapy Provided: Individual Psychotherapy 45 minutes     Length of time in session: 45 minutes, follow up in 2 week    Goals addressed in session: Goal 1     Pain:      none    0    Current suicide risk : Grady Sherman stated that she continues to struggle with what she needs in her life  She shared that she did make a list on focused on the fact that she wears a "mask" for others  Discussing why she does this and ways for her to be able to express herself to others  Also discussing starting to focus on herself and her own needs rather than always thinking of others first  Giving supportive therapy  A- Progress - Continuing to process her emotions  P-Continue treatment    [address]: Diagnosis and Treatment Plan explained to Nory Shields relates understanding diagnosis and is agreeable to Treatment Plan   Yes

## 2018-11-25 DIAGNOSIS — F33.2 MDD (MAJOR DEPRESSIVE DISORDER), RECURRENT SEVERE, WITHOUT PSYCHOSIS (HCC): ICD-10-CM

## 2018-11-27 RX ORDER — LORAZEPAM 0.5 MG/1
TABLET ORAL
Qty: 90 TABLET | Refills: 0 | Status: SHIPPED | OUTPATIENT
Start: 2018-11-27 | End: 2019-01-08 | Stop reason: SDUPTHER

## 2018-12-12 ENCOUNTER — OFFICE VISIT (OUTPATIENT)
Dept: BEHAVIORAL/MENTAL HEALTH CLINIC | Facility: CLINIC | Age: 58
End: 2018-12-12
Payer: COMMERCIAL

## 2018-12-12 DIAGNOSIS — F33.2 MDD (MAJOR DEPRESSIVE DISORDER), RECURRENT SEVERE, WITHOUT PSYCHOSIS (HCC): Primary | ICD-10-CM

## 2018-12-12 PROCEDURE — 90834 PSYTX W PT 45 MINUTES: CPT | Performed by: SOCIAL WORKER

## 2018-12-12 NOTE — PSYCH
Treatment Plan Tracking    # 3Treatment Plan not completed within required time limits due to: Treatment plan created verbally  PT will sign at her next session
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
## 2018-12-12 NOTE — PSYCH
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, nurse    Diagnosis:   No diagnosis found  Area of Needs:   Stress    Long Term Goal 1: Be able to deal with the stress in my life       Target Date: 3/12/18  Completion Date: TBD         Short Term Objectives for Goal 1:       Set boundaries with others,       Understand that I can't control other people's behaviors or choices       Take time for myself and don't over-extend myself for others       Be able to talk about what my triggers are from my past and be able to move past it       Be at peace with my daughter's choices and try to support her    GOAL 1: Modality: Individual 1-2x per month   Completion Date TBD and The person(s) responsible for carrying out the plan is  [address] [place] [address] Street: Diagnosis and Treatment Plan explained to Millers Tavernel Kimbrough relates understanding diagnosis and is agreeable to Treatment Plan         Client Comments : Please share your thoughts, feelings, need and/or experiences regarding your treatment plan:       __________________________________________________________________    __________________________________________________________________    __________________________________________________________________    __________________________________________________________________    _______________________________________                Patient signature, Date Time: __________________________________________             Physician cosigner signature, Date, Time: ________________________________

## 2019-01-08 ENCOUNTER — OFFICE VISIT (OUTPATIENT)
Dept: PSYCHIATRY | Facility: CLINIC | Age: 59
End: 2019-01-08
Payer: COMMERCIAL

## 2019-01-08 DIAGNOSIS — F32.A DEPRESSION, UNSPECIFIED DEPRESSION TYPE: ICD-10-CM

## 2019-01-08 DIAGNOSIS — F33.2 MDD (MAJOR DEPRESSIVE DISORDER), RECURRENT SEVERE, WITHOUT PSYCHOSIS (HCC): ICD-10-CM

## 2019-01-08 PROCEDURE — 99213 OFFICE O/P EST LOW 20 MIN: CPT | Performed by: PSYCHIATRY & NEUROLOGY

## 2019-01-08 RX ORDER — LORAZEPAM 0.5 MG/1
0.5 TABLET ORAL 3 TIMES DAILY
Qty: 90 TABLET | Refills: 2 | Status: SHIPPED | OUTPATIENT
Start: 2019-01-08

## 2019-01-08 RX ORDER — BUPROPION HYDROCHLORIDE 300 MG/1
300 TABLET ORAL EVERY MORNING
Qty: 90 TABLET | Refills: 0 | Status: SHIPPED | OUTPATIENT
Start: 2019-01-08

## 2019-01-08 NOTE — PSYCH
Subjective: Medication Management     Patient ID: Katerin Spain is a 62 y o  female  HPI ROS Appetite Changes and Sleep: normal appetite, decreased energy, no weight change and normal number of sleep hours   Patient stated she has been more irritable and she feels very upset and tearful  She stated she has been isolating and avoiding going to [employer]  She stated it has been difficult to function at work  Patient stated she feels she can't talk to her family about the way feels because she doesn't want to burden them  She is scheduled to see her counselor next week  Review Of Systems:     Mood Anxiety and Depression   Behavior Normal    Thought Content Disturbing Thoughts, Feelings and Unreasonalbe or Irrational Fears   General Emotional Problems and Decreased Functioning   Personality Normal   Other Psych Symptoms Normal   Constitutional Negative   ENT Negative   Cardiovascular Negative   Respiratory Negative   Gastrointestinal Negative   Genitourinary Negative   Musculoskeletal Negative   Integumentary Negative   Neurological Negative   Endocrine Normal    Other Symptoms Normal              Laboratory Results: No results found for this or any previous visit  Substance Abuse History:  History   Drug use: Unknown       Family Psychiatric History: No family history on file  The following portions of the patient's history were reviewed and updated as appropriate: allergies, current medications, past family history, past medical history, past social history, past surgical history and problem list     Social History     Social History    Marital status: /Civil Union     Spouse name: N/A    Number of children: N/A    Years of education: N/A     Occupational History    Not on file       Social History Main Topics    Smoking status: Not on file    Smokeless tobacco: Not on file    Alcohol use Not on file    Drug use: Unknown    Sexual activity: Not on file     Other Topics Concern    Not on file     Social History Narrative    No narrative on file     Social History     Social History Narrative    No narrative on file       Objective:       Mental status:  Appearance calm and cooperative , adequate hygiene and grooming and good eye contact    Mood dysphoric and depressed   Affect affect was tearful   Speech a normal rate and fluent   Thought Processes coherent/organized and normal thought processes   Hallucinations no hallucinations present    Thought Content no delusions   Abnormal Thoughts no suicidal thoughts  and no homicidal thoughts    Orientation  oriented to person and place and time   Remote Memory short term memory intact and long term memory intact   Attention Span concentration impaired   Intellect Appears to be of Average Intelligence   Insight Limited insight   Judgement judgment was limited   Muscle Strength Muscle strength and tone were normal and Normal gait    Language no difficulty naming common objects, no difficulty repeating a phrase  and no difficulty writing a sentence    Fund of Knowledge displays adequate knowledge of current events, adequate fund of knowledge regarding past history and adequate fund of knowledge regarding vocabulary    Pain none   Pain Scale 0       Assessment/Plan:       Diagnoses and all orders for this visit:    Depression, unspecified depression type    MDD (major depressive disorder), recurrent severe, without psychosis ([facility] )  -     vortioxetine (TRINTELLIX) 10 MG tablet; Take 1 tablet (10 mg total) by mouth daily  -     LORazepam (ATIVAN) 0 5 mg tablet; Take 1 tablet (0 5 mg total) by mouth 3 (three) times a day  -     buPROPion (WELLBUTRIN XL) 300 mg 24 hr tablet;  Take 1 tablet (300 mg total) by mouth every morning            Treatment Recommendations- Risks Benefits      Immediate Medical/Psychiatric/Psychotherapy Treatments and Any Precautions:d/c viibryd, r/s trintellix and titrate dose as tolerated    Risks, Benefits And Possible Side Effects Of Medications:  {PSYCH RISK, BENEFITS AND POSSIBLE SIDE EFFECTS (Optional):86593    Controlled Medication Discussion: Discussed with patient Black Box warning on concurrent use of benzodiazepines and opioid medications including sedation, respiratory depression, coma and death  Patient understands the risk of treatment with benzodiazepines in addition to opioids and wants to continue taking those medications  , Discussed with patient the risks of sedation, respiratory depression, impairment of ability to drive and potential for abuse and addiction related to treatment with benzodiazepine medications  The patient understands risk of treatment with benzodiazepine medications, agrees to not drive if feels impaired and agrees to take medications as prescribed   and The patient has been filling controlled prescriptions on time as prescribed to [place] [address] program

## 2019-02-20 ENCOUNTER — DOCUMENTATION (OUTPATIENT)
Dept: BEHAVIORAL/MENTAL HEALTH CLINIC | Facility: CLINIC | Age: 59
End: 2019-02-20
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
## 2019-02-26 ENCOUNTER — DOCUMENTATION (OUTPATIENT)
Dept: PSYCHIATRY | Facility: CLINIC | Age: 59
End: 2019-02-26

## 2019-03-19 ENCOUNTER — OFFICE VISIT (OUTPATIENT)
Dept: BEHAVIORAL/MENTAL HEALTH CLINIC | Facility: CLINIC | Age: 59
End: 2019-03-19
Payer: COMMERCIAL

## 2019-03-19 DIAGNOSIS — F33.2 MDD (MAJOR DEPRESSIVE DISORDER), RECURRENT SEVERE, WITHOUT PSYCHOSIS (HCC): Primary | ICD-10-CM

## 2019-03-19 PROCEDURE — 90834 PSYTX W PT 45 MINUTES: CPT | Performed by: SOCIAL WORKER

## 2019-03-19 NOTE — PSYCH
Psychotherapy Provided: Individual Psychotherapy 45 minutes     Length of time in session: 45 minutes, follow up in 1 month    Goals addressed in session: Goal 1     Pain:      none    0    Current suicide risk : Low     DTim Self stated that she has been feeling better  She stated that she [facility] to [facility] in January and is currently finishing up her course of treatment  She stated that she is feeling relief from her symptoms and has been able to function better in her life  She stated that she is currently working on resuming life activities and increasing socialization  Discussing ways for her to be able to move forward with her life and rekindle friendships as well as forming new relationships  Reviewing and signing her treatment plan  Giving supportive therapy  A- Progress - Continuing to process her emotions  P-Continue treatment    [address]: Diagnosis and Treatment Plan explained to Leeroy Kimbrough relates understanding diagnosis and is agreeable to Treatment Plan   Yes

## 2019-05-14 ENCOUNTER — OFFICE VISIT (OUTPATIENT)
Dept: BEHAVIORAL/MENTAL HEALTH CLINIC | Facility: CLINIC | Age: 59
End: 2019-05-14
Payer: COMMERCIAL

## 2019-05-14 DIAGNOSIS — F33.2 MDD (MAJOR DEPRESSIVE DISORDER), RECURRENT SEVERE, WITHOUT PSYCHOSIS (HCC): Primary | ICD-10-CM

## 2019-05-14 PROCEDURE — 90834 PSYTX W PT 45 MINUTES: CPT | Performed by: SOCIAL WORKER

## 2019-07-01 DIAGNOSIS — F33.2 MDD (MAJOR DEPRESSIVE DISORDER), RECURRENT SEVERE, WITHOUT PSYCHOSIS (HCC): Primary | ICD-10-CM

## 2019-07-01 RX ORDER — NORTRIPTYLINE HYDROCHLORIDE 10 MG/1
10 CAPSULE ORAL
COMMUNITY
Start: 2019-04-18

## 2019-07-01 RX ORDER — LIOTHYRONINE SODIUM 25 UG/1
25 TABLET ORAL DAILY
COMMUNITY
Start: 2019-04-01 | End: 2019-07-01 | Stop reason: SDUPTHER

## 2019-07-01 RX ORDER — LIOTHYRONINE SODIUM 25 UG/1
25 TABLET ORAL DAILY
Qty: 90 TABLET | Refills: 1 | Status: SHIPPED | OUTPATIENT
Start: 2019-07-01 | End: 2020-07-15

## 2019-07-01 RX ORDER — ESTRADIOL 0.05 MG/D
1 PATCH TRANSDERMAL WEEKLY
COMMUNITY
End: 2019-07-03 | Stop reason: ALTCHOICE

## 2019-07-03 DIAGNOSIS — F33.2 MDD (MAJOR DEPRESSIVE DISORDER), RECURRENT SEVERE, WITHOUT PSYCHOSIS (HCC): ICD-10-CM

## 2019-07-22 ENCOUNTER — SOCIAL WORK (OUTPATIENT)
Dept: BEHAVIORAL/MENTAL HEALTH CLINIC | Facility: CLINIC | Age: 59
End: 2019-07-22
Payer: COMMERCIAL

## 2019-07-22 DIAGNOSIS — F33.2 MDD (MAJOR DEPRESSIVE DISORDER), RECURRENT SEVERE, WITHOUT PSYCHOSIS (HCC): Primary | ICD-10-CM

## 2019-07-22 PROCEDURE — 90834 PSYTX W PT 45 MINUTES: CPT | Performed by: SOCIAL WORKER

## 2019-07-22 NOTE — PSYCH
Psychotherapy Provided: Individual Psychotherapy 456 minutes     Length of time in session: 45 minutes, follow up in 1 month    Goals addressed in session: Goal 1     Pain:      none    0    Current suicide risk : Low     BINTA- Leno Keon stated that she continues to feel okay  She stated that she has noticed that things that used to be difficult for her emotionally are much less so  She stated that she doesn't feel a connection to her  and that this can be difficult for her  Discussing ways that she feels that they can reconnect as well as ways for her to begin to focus on her own needs  Giving supportive therapy  A- Progress - Continuing to process her emotions  P-Continue treatment    [address]: Diagnosis and Treatment Plan explained to Luismarie Court relates understanding diagnosis and is agreeable to Treatment Plan   Yes

## 2019-07-22 NOTE — PSYCH
Treatment Plan Tracking    # 4 Treatment Plan not completed within required time limits due to: Treatment plan not signed due to technical difficulties

## 2019-07-30 DIAGNOSIS — G43.009 MIGRAINE WITHOUT AURA AND WITHOUT STATUS MIGRAINOSUS, NOT INTRACTABLE: Primary | ICD-10-CM

## 2019-07-30 RX ORDER — SUMATRIPTAN 100 MG/1
100 TABLET, FILM COATED ORAL ONCE AS NEEDED
Qty: 27 TABLET | Refills: 3 | Status: SHIPPED | OUTPATIENT
Start: 2019-07-30 | End: 2019-10-28

## 2019-08-01 DIAGNOSIS — G43.009 MIGRAINE WITHOUT AURA AND WITHOUT STATUS MIGRAINOSUS, NOT INTRACTABLE: Primary | ICD-10-CM

## 2019-08-01 RX ORDER — ELETRIPTAN HYDROBROMIDE 40 MG/1
40 TABLET, FILM COATED ORAL ONCE AS NEEDED
Qty: 6 TABLET | Refills: 0 | Status: SHIPPED | OUTPATIENT
Start: 2019-08-01 | End: 2020-01-16 | Stop reason: ALTCHOICE

## 2019-08-01 RX ORDER — ELETRIPTAN HYDROBROMIDE 40 MG/1
40 TABLET, FILM COATED ORAL ONCE AS NEEDED
Qty: 6 TABLET | Refills: 0 | Status: SHIPPED | OUTPATIENT
Start: 2019-08-01 | End: 2019-08-01 | Stop reason: SDUPTHER

## 2019-09-16 ENCOUNTER — TELEPHONE (OUTPATIENT)
Dept: PSYCHIATRY | Facility: CLINIC | Age: 59
End: 2019-09-16

## 2019-10-24 NOTE — PSYCH
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, nurse       Diagnosis:   No diagnosis found  Area of Needs:   Stress    Long Term Goal 1:  Be able to deal with the stress in my life    Target Date: 11/14/18  Completion Date: TBA         Short Term Objectives for Goal 1:       Set boundaries with others,       Understand that I can't control other people's behaviors or choices       Take time for myself and don't over-extend myself for others       Be able to talk about what my triggers are from my past and be able to move past it      Be at peace with my daughter's choices and try to support her      GOAL 1: Modality: Individual 1-2x per month   Completion Date TBD and The person(s) responsible for carrying out the plan is  [address] [place] [address] Drive: Diagnosis and Treatment Plan explained to Rosie gauthier understanding diagnosis and is agreeable to Treatment Plan         Client Comments : Please share your thoughts, feelings, need and/or experiences regarding your treatment plan:       __________________________________________________________________    __________________________________________________________________    __________________________________________________________________    __________________________________________________________________    _______________________________________                Patient signature, Date Time: __________________________________________             Physician cosigner signature, Date, Time: ________________________________ Aisha Herrera

## 2019-11-19 ENCOUNTER — CLINICAL SUPPORT (OUTPATIENT)
Dept: INTERNAL MEDICINE CLINIC | Facility: CLINIC | Age: 59
End: 2019-11-19
Payer: COMMERCIAL

## 2019-11-19 DIAGNOSIS — Z23 NEED FOR INFLUENZA VACCINATION: Primary | ICD-10-CM

## 2020-01-16 DIAGNOSIS — S05.00XA CORNEAL ABRASION, UNSPECIFIED LATERALITY, INITIAL ENCOUNTER: Primary | ICD-10-CM

## 2020-01-16 RX ORDER — LIOTHYRONINE SODIUM 25 UG/1
1 TABLET ORAL
COMMUNITY
Start: 2020-01-13 | End: 2020-01-16

## 2020-01-16 RX ORDER — KETOROLAC TROMETHAMINE 4 MG/ML
1 SOLUTION/ DROPS OPHTHALMIC 4 TIMES DAILY
Qty: 5 ML | Refills: 1 | Status: SHIPPED | OUTPATIENT
Start: 2020-01-16 | End: 2020-01-31

## 2020-01-16 RX ORDER — SUMATRIPTAN 100 MG/1
100 TABLET, FILM COATED ORAL 2 TIMES DAILY PRN
COMMUNITY
Start: 2019-11-25 | End: 2020-01-16

## 2020-01-31 DIAGNOSIS — M62.838 CERVICAL PARASPINAL MUSCLE SPASM: Primary | ICD-10-CM

## 2020-01-31 RX ORDER — CYCLOBENZAPRINE HCL 10 MG
10 TABLET ORAL 3 TIMES DAILY PRN
Qty: 30 TABLET | Refills: 0 | Status: SHIPPED | OUTPATIENT
Start: 2020-01-31 | End: 2021-02-23 | Stop reason: SDUPTHER

## 2020-02-10 ENCOUNTER — APPOINTMENT (EMERGENCY)
Dept: RADIOLOGY | Facility: HOSPITAL | Age: 60
End: 2020-02-10
Payer: COMMERCIAL

## 2020-02-10 ENCOUNTER — HOSPITAL ENCOUNTER (EMERGENCY)
Facility: HOSPITAL | Age: 60
Discharge: HOME | End: 2020-02-10
Attending: EMERGENCY MEDICINE
Payer: COMMERCIAL

## 2020-02-10 ENCOUNTER — OFFICE VISIT (OUTPATIENT)
Dept: URGENT CARE | Facility: CLINIC | Age: 60
End: 2020-02-10
Payer: COMMERCIAL

## 2020-02-10 VITALS
HEART RATE: 90 BPM | DIASTOLIC BLOOD PRESSURE: 93 MMHG | SYSTOLIC BLOOD PRESSURE: 173 MMHG | OXYGEN SATURATION: 98 % | RESPIRATION RATE: 18 BRPM

## 2020-02-10 VITALS
TEMPERATURE: 98 F | OXYGEN SATURATION: 99 % | HEIGHT: 66 IN | HEART RATE: 103 BPM | BODY MASS INDEX: 22.18 KG/M2 | WEIGHT: 138 LBS | RESPIRATION RATE: 18 BRPM | SYSTOLIC BLOOD PRESSURE: 165 MMHG | DIASTOLIC BLOOD PRESSURE: 89 MMHG

## 2020-02-10 DIAGNOSIS — R20.2 PARESTHESIA OF LEFT ARM: ICD-10-CM

## 2020-02-10 DIAGNOSIS — R07.89 CHEST PRESSURE: Primary | ICD-10-CM

## 2020-02-10 DIAGNOSIS — R07.89 CHEST TIGHTNESS: Primary | ICD-10-CM

## 2020-02-10 DIAGNOSIS — R20.2 PARESTHESIA OF RIGHT ARM: ICD-10-CM

## 2020-02-10 LAB
ALBUMIN SERPL BCP-MCNC: 4.3 G/DL (ref 3.5–5)
ALP SERPL-CCNC: 80 U/L (ref 46–116)
ALT SERPL W P-5'-P-CCNC: 33 U/L (ref 12–78)
ANION GAP SERPL CALCULATED.3IONS-SCNC: 7 MMOL/L (ref 4–13)
APTT PPP: 30 SECONDS (ref 23–37)
AST SERPL W P-5'-P-CCNC: 22 U/L (ref 5–45)
BASOPHILS # BLD AUTO: 0.02 THOUSANDS/ΜL (ref 0–0.1)
BASOPHILS NFR BLD AUTO: 1 % (ref 0–1)
BILIRUB SERPL-MCNC: 0.57 MG/DL (ref 0.2–1)
BUN SERPL-MCNC: 14 MG/DL (ref 5–25)
CALCIUM SERPL-MCNC: 10.1 MG/DL (ref 8.3–10.1)
CHLORIDE SERPL-SCNC: 100 MMOL/L (ref 100–108)
CO2 SERPL-SCNC: 30 MMOL/L (ref 21–32)
CREAT SERPL-MCNC: 0.7 MG/DL (ref 0.6–1.3)
D DIMER PPP FEU-MCNC: <0.27 UG/ML FEU
EOSINOPHIL # BLD AUTO: 0.05 THOUSAND/ΜL (ref 0–0.61)
EOSINOPHIL NFR BLD AUTO: 1 % (ref 0–6)
ERYTHROCYTE [DISTWIDTH] IN BLOOD BY AUTOMATED COUNT: 12.2 % (ref 11.6–15.1)
GFR SERPL CREATININE-BSD FRML MDRD: 95 ML/MIN/1.73SQ M
GLUCOSE SERPL-MCNC: 93 MG/DL (ref 65–140)
HCT VFR BLD AUTO: 39.7 % (ref 34.8–46.1)
HGB BLD-MCNC: 13 G/DL (ref 11.5–15.4)
IMM GRANULOCYTES # BLD AUTO: 0.01 THOUSAND/UL (ref 0–0.2)
IMM GRANULOCYTES NFR BLD AUTO: 0 % (ref 0–2)
INR PPP: 0.97 (ref 0.84–1.19)
LYMPHOCYTES # BLD AUTO: 1.34 THOUSANDS/ΜL (ref 0.6–4.47)
LYMPHOCYTES NFR BLD AUTO: 30 % (ref 14–44)
MAGNESIUM SERPL-MCNC: 2.1 MG/DL (ref 1.6–2.6)
MCH RBC QN AUTO: 29.5 PG (ref 26.8–34.3)
MCHC RBC AUTO-ENTMCNC: 32.7 G/DL (ref 31.4–37.4)
MCV RBC AUTO: 90 FL (ref 82–98)
MONOCYTES # BLD AUTO: 0.36 THOUSAND/ΜL (ref 0.17–1.22)
MONOCYTES NFR BLD AUTO: 8 % (ref 4–12)
NEUTROPHILS # BLD AUTO: 2.66 THOUSANDS/ΜL (ref 1.85–7.62)
NEUTS SEG NFR BLD AUTO: 60 % (ref 43–75)
NRBC BLD AUTO-RTO: 0 /100 WBCS
PLATELET # BLD AUTO: 170 THOUSANDS/UL (ref 149–390)
PMV BLD AUTO: 9.1 FL (ref 8.9–12.7)
POTASSIUM SERPL-SCNC: 4.1 MMOL/L (ref 3.5–5.3)
PROT SERPL-MCNC: 8 G/DL (ref 6.4–8.2)
PROTHROMBIN TIME: 12.3 SECONDS (ref 11.6–14.5)
RBC # BLD AUTO: 4.41 MILLION/UL (ref 3.81–5.12)
SODIUM SERPL-SCNC: 137 MMOL/L (ref 136–145)
TROPONIN I SERPL-MCNC: <0.02 NG/ML
TSH SERPL DL<=0.05 MIU/L-ACNC: 2.61 UIU/ML (ref 0.36–3.74)
WBC # BLD AUTO: 4.44 THOUSAND/UL (ref 4.31–10.16)

## 2020-02-10 PROCEDURE — 85025 COMPLETE CBC W/AUTO DIFF WBC: CPT | Performed by: EMERGENCY MEDICINE

## 2020-02-10 PROCEDURE — 84484 ASSAY OF TROPONIN QUANT: CPT | Performed by: EMERGENCY MEDICINE

## 2020-02-10 PROCEDURE — 99285 EMERGENCY DEPT VISIT HI MDM: CPT

## 2020-02-10 PROCEDURE — 85730 THROMBOPLASTIN TIME PARTIAL: CPT | Performed by: EMERGENCY MEDICINE

## 2020-02-10 PROCEDURE — 93005 ELECTROCARDIOGRAM TRACING: CPT

## 2020-02-10 PROCEDURE — 36415 COLL VENOUS BLD VENIPUNCTURE: CPT | Performed by: EMERGENCY MEDICINE

## 2020-02-10 PROCEDURE — 80053 COMPREHEN METABOLIC PANEL: CPT | Performed by: EMERGENCY MEDICINE

## 2020-02-10 PROCEDURE — 99285 EMERGENCY DEPT VISIT HI MDM: CPT | Performed by: EMERGENCY MEDICINE

## 2020-02-10 PROCEDURE — 85379 FIBRIN DEGRADATION QUANT: CPT | Performed by: EMERGENCY MEDICINE

## 2020-02-10 PROCEDURE — 85610 PROTHROMBIN TIME: CPT | Performed by: EMERGENCY MEDICINE

## 2020-02-10 PROCEDURE — 99213 OFFICE O/P EST LOW 20 MIN: CPT | Performed by: NURSE PRACTITIONER

## 2020-02-10 PROCEDURE — 84443 ASSAY THYROID STIM HORMONE: CPT | Performed by: EMERGENCY MEDICINE

## 2020-02-10 PROCEDURE — 83735 ASSAY OF MAGNESIUM: CPT | Performed by: EMERGENCY MEDICINE

## 2020-02-10 PROCEDURE — 71046 X-RAY EXAM CHEST 2 VIEWS: CPT

## 2020-02-11 LAB
ATRIAL RATE: 83 BPM
P AXIS: 71 DEGREES
PR INTERVAL: 114 MS
QRS AXIS: 45 DEGREES
QRSD INTERVAL: 102 MS
QT INTERVAL: 348 MS
QTC INTERVAL: 408 MS
T WAVE AXIS: 43 DEGREES
VENTRICULAR RATE: 83 BPM

## 2020-02-11 PROCEDURE — 93010 ELECTROCARDIOGRAM REPORT: CPT | Performed by: INTERNAL MEDICINE

## 2020-02-11 NOTE — PATIENT INSTRUCTIONS
Today you were evaluated, and based on your symptoms, clinical presentation, and testing, my recommendation it that you go directly to the emergency room  As an urgent care we are limited to the studies and diagnostic testing we can perform, and your current condition requires a higher level of care  We can either call an ambulance for you, or you can go with a family member  If you are alone my preference would be for you to go by ambulance

## 2020-02-11 NOTE — PROGRESS NOTES
[facility]        NAME: Khadra Mathis is a 61 y o  female  : 1960    MRN: [id number]  DATE: February 10, 2020  TIME: 7:40 PM    Assessment and Plan   Chest tightness [R07 89]  1  Chest tightness  Transfer to other facility    ECG 12 lead         Patient Instructions     Today you were evaluated, and based on your symptoms, clinical presentation, and testing, my recommendation it that you go directly to the emergency room  As an urgent care we are limited to the studies and diagnostic testing we can perform, and your current condition requires a higher level of care  We can either call an ambulance for you, or you can go with a family member  If you are alone my preference would be for you to go by ambulance  Follow up with PCP in 3-5 days  Proceed to  ER if symptoms worsen  Chief Complaint     Chief Complaint   Patient presents with    Arm Pain     had 2 episodes of chest heaviness with tingling down both arms, 1st episodie was at 10am while making the bed  pt sts she had a strange feeling, lightheaded and nausea, sat down for approx 30 mins, 2nd episode about 12n, attempted to iron and had the same feeling  continues to feel poorly, admits to tingling and squeezing in both arms  patinet did feel like she had indigestion and took something for that as well as a baby asa  Denies chest pain, sob, visula disturbance, weakness, grasp or gait issues,          History of Present Illness       62 y/o female presents with 2 episodes of chest pressure, lightheaded, bilateral are squeezing/tingling, and nausea  The first episode began at 10:00 am when she was making the bed  At that time she took an antacid, thinking that the symptoms were related to GERD, and rested  The feeling never fully resolved  Around 12:00 noon she attempted to iron, she still did not feel well  She continued with tingling and squeezing in the arms and chest tightness  She did take an aspirin    She denies any diaphoresis, chest pain, palpitations, weakness, visual disturbances, mental status changes  There is no known cardia history  Review of Systems   Review of Systems   Constitutional: Negative for fever  Respiratory: Positive for chest tightness  Cardiovascular: Negative for palpitations  Gastrointestinal: Positive for nausea  Skin: Positive for color change and pallor  Neurological: Negative for dizziness, weakness, numbness and headaches  Positive for bilateral arm tingling           Current Medications       Current Outpatient Medications:     buPROPion (WELLBUTRIN XL) 300 mg 24 hr tablet, Take 1 tablet (300 mg total) by mouth every morning, Disp: 90 tablet, Rfl: 0    liothyronine (CYTOMEL) 25 mcg tablet, Take 1 tablet (25 mcg total) by mouth daily for 180 days, Disp: 90 tablet, Rfl: 1    nortriptyline (PAMELOR) 10 mg capsule, Take 10 mg by mouth daily at bedtime, Disp: , Rfl:     vortioxetine (TRINTELLIX) 10 MG tablet, Take 1 tablet (10 mg total) by mouth daily, Disp: 30 tablet, Rfl: 0    cyclobenzaprine (FLEXERIL) 10 mg tablet, Take 1 tablet (10 mg total) by mouth 3 (three) times a day as needed for muscle spasms (Patient not taking: Reported on 2/10/2020), Disp: 30 tablet, Rfl: 0    LORazepam (ATIVAN) 0 5 mg tablet, Take 1 tablet (0 5 mg total) by mouth 3 (three) times a day, Disp: 90 tablet, Rfl: 2    SUMAtriptan (IMITREX) 100 mg tablet, Take 1 tablet (100 mg total) by mouth once as needed for migraine for up to 90 days, Disp: 27 tablet, Rfl: 3    Current Allergies     Allergies as of 02/10/2020 - Reviewed 02/10/2020   Allergen Reaction Noted    Other              The following portions of the patient's history were reviewed and updated as appropriate: allergies, current medications, past family history, past medical history, past social history, past surgical history and problem list      Past Medical History:   Diagnosis Date    Depression        Past Surgical History: Procedure Laterality Date    HYSTERECTOMY         History reviewed  No pertinent family history  Medications have been verified  Objective   /89   Pulse 103   Temp 98 °F (36 7 °C)   Resp 18   Ht 5' 6" (1 676 m)   Wt 62 6 kg (138 lb)   SpO2 99%   BMI 22 27 kg/m²        Physical Exam     Physical Exam   Constitutional: She appears well-developed and well-nourished  No distress  Neck: Normal carotid pulses present  Carotid bruit is not present  Cardiovascular: Regular rhythm and normal heart sounds  Tachycardia present  Pulmonary/Chest: Effort normal and breath sounds normal    Abdominal: Normal appearance  There is no tenderness  Skin: Skin is warm and dry  Rash noted  Flushness of the skin began on the anterior chest wall and through exam traveled op to anterior neck  No hives present  Psychiatric: She has a normal mood and affect  Her speech is normal and behavior is normal    Nursing note and vitals reviewed

## 2020-02-11 NOTE — ED PROVIDER NOTES
History  Chief Complaint   Patient presents with    Chest Pain     patient sent from urgent care after 2 episodes of chest tightness, and bilateral arm squeezing and dizziness today  took a 325 mg aspirin        History provided by:  Patient   used: No    68-year-old female with history of depression only presented for evaluation after having chest pressure earlier this morning followed by a paresthesias in both arms  Symptoms have improved  She denies any current pressure in the chest but states she has a funny feeling  She states her finger still feels a little bit tingly but significantly improved from earlier  Also reports that prior to those symptoms she noticed a bluish/purple discoloration of both hands but was asymptomatic  This also resolved spontaneously  No neck pain or radicular symptoms  On exam here she is somewhat hypertensive but otherwise normal vitals  Heart and lung sounds normal   Normal strength and sensation of the extremities  Differential diagnosis includes ACS, PE, anxiety/panic attack, muscular strain  Plan EKG, chest x-ray and labs and will re-evaluate  Prior to Admission Medications   Prescriptions Last Dose Informant Patient Reported? Taking?    LORazepam (ATIVAN) 0 5 mg tablet   No No   Sig: Take 1 tablet (0 5 mg total) by mouth 3 (three) times a day   SUMAtriptan (IMITREX) 100 mg tablet   No No   Sig: Take 1 tablet (100 mg total) by mouth once as needed for migraine for up to 90 days   buPROPion (WELLBUTRIN XL) 300 mg 24 hr tablet   No No   Sig: Take 1 tablet (300 mg total) by mouth every morning   cyclobenzaprine (FLEXERIL) 10 mg tablet   No No   Sig: Take 1 tablet (10 mg total) by mouth 3 (three) times a day as needed for muscle spasms   Patient not taking: Reported on 2/10/2020   liothyronine (CYTOMEL) 25 mcg tablet   No No   Sig: Take 1 tablet (25 mcg total) by mouth daily for 180 days   nortriptyline (PAMELOR) 10 mg capsule   Yes No   Sig: Take 10 mg by mouth daily at bedtime   vortioxetine (TRINTELLIX) 10 MG tablet   No No   Sig: Take 1 tablet (10 mg total) by mouth daily      Facility-Administered Medications: None       Past Medical History:   Diagnosis Date    Depression        Past Surgical History:   Procedure Laterality Date    HYSTERECTOMY         History reviewed  No pertinent family history  I have reviewed and agree with the history as documented  Social History     Tobacco Use    Smoking status: Never Smoker    Smokeless tobacco: Never Used   Substance Use Topics    Alcohol use: Never     Frequency: Never    Drug use: Never        Review of Systems   Constitutional: Negative for activity change, appetite change, diaphoresis and fatigue  Respiratory: Negative for chest tightness and shortness of breath  Cardiovascular: Negative for palpitations and leg swelling  Chest pressure  Gastrointestinal: Negative for abdominal pain, nausea and vomiting  Genitourinary: Negative for difficulty urinating  Musculoskeletal: Negative for back pain, gait problem and neck pain  Skin: Positive for color change  Negative for wound  Neurological: Positive for numbness  Negative for dizziness, weakness, light-headedness and headaches  All other systems reviewed and are negative  Physical Exam  Physical Exam   Constitutional: She is oriented to person, place, and time  She appears well-developed and well-nourished  She does not appear ill  Neck: Normal range of motion  Neck supple  No JVD present  Cardiovascular: Normal rate, regular rhythm, intact distal pulses and normal pulses  No murmur heard  Pulmonary/Chest: Effort normal and breath sounds normal    Abdominal: Soft  She exhibits no distension  There is no tenderness  Musculoskeletal:        Right lower leg: She exhibits no tenderness and no edema  Left lower leg: She exhibits no tenderness and no edema     Neurological: She is alert and oriented to person, place, and time  Normal strength, sensation of the extremities  Skin: Skin is warm and dry  No ecchymosis and no rash noted  No cyanosis or erythema  Psychiatric: She has a normal mood and affect  Her behavior is normal    Nursing note and vitals reviewed  Vital Signs  ED Triage Vitals [02/10/20 2014]   Temp Pulse Respirations Blood Pressure SpO2   -- 93 18 (!) 173/93 96 %      Temp src Heart Rate Source Patient Position - Orthostatic VS BP Location FiO2 (%)   -- Monitor Sitting Right arm --      Pain Score       --           Vitals:    02/10/20 2014 02/10/20 2145   BP: (!) 173/93    Pulse: 93 90   Patient Position - Orthostatic VS: Sitting          Visual Acuity      ED Medications  Medications - No data to display    Diagnostic Studies  Results Reviewed     Procedure Component Value Units Date/Time    TSH [[id number]]  (Normal) Collected:  02/10/20 2057    Lab Status:  Final result Specimen:  Blood from Arm, Left Updated:  02/10/20 2135     TSH 3RD GENERATON 2 613 uIU/mL     Narrative:       Patients undergoing fluorescein dye angiography may retain small amounts of fluorescein in the body for 48-72 hours post procedure  Samples containing fluorescein can produce falsely depressed TSH values  If the patient had this procedure,a specimen should be resubmitted post fluorescein clearance        Magnesium [[id number]]  (Normal) Collected:  02/10/20 2057    Lab Status:  Final result Specimen:  Blood from Arm, Left Updated:  02/10/20 2135     Magnesium 2 1 mg/dL     Troponin I [[id number]]  (Normal) Collected:  02/10/20 2057    Lab Status:  Final result Specimen:  Blood from Arm, Left Updated:  02/10/20 2127     Troponin I <0 02 ng/mL     Comprehensive metabolic panel [[id number]] Collected:  02/10/20 2057    Lab Status:  Final result Specimen:  Blood from Arm, Left Updated:  02/10/20 2126     Sodium 137 mmol/L      Potassium 4 1 mmol/L      Chloride 100 mmol/L      CO2 30 mmol/L      ANION GAP 7 mmol/L      BUN 14 mg/dL      Creatinine 0 70 mg/dL      Glucose 93 mg/dL      Calcium 10 1 mg/dL      AST 22 U/L      ALT 33 U/L      Alkaline Phosphatase 80 U/L      Total Protein 8 0 g/dL      Albumin 4 3 g/dL      Total Bilirubin 0 57 mg/dL      eGFR 95 ml/min/1 73sq m     Narrative:       National Kidney Disease Foundation guidelines for Chronic Kidney Disease (CKD):     Stage 1 with normal or high GFR (GFR > 90 mL/min/1 73 square meters)    Stage 2 Mild CKD (GFR = 60-89 mL/min/1 73 square meters)    Stage 3A Moderate CKD (GFR = 45-59 mL/min/1 73 square meters)    Stage 3B Moderate CKD (GFR = 30-44 mL/min/1 73 square meters)    Stage 4 Severe CKD (GFR = 15-29 mL/min/1 73 square meters)    Stage 5 End Stage CKD (GFR <15 mL/min/1 73 square meters)  Note: GFR calculation is accurate only with a steady state creatinine    D-Dimer [[id number]]  (Normal) Collected:  02/10/20 2057    Lab Status:  Final result Specimen:  Blood from Arm, Left Updated:  02/10/20 2120     D-Dimer, Quant <0 27 ug/ml FEU     Protime-INR [[id number]]  (Normal) Collected:  02/10/20 2057    Lab Status:  Final result Specimen:  Blood from Arm, Left Updated:  02/10/20 2116     Protime 12 3 seconds      INR 0 97    APTT [[id number]]  (Normal) Collected:  02/10/20 2057    Lab Status:  Final result Specimen:  Blood from Arm, Left Updated:  02/10/20 2116     PTT 30 seconds     CBC and differential [[id number]] Collected:  02/10/20 2057    Lab Status:  Final result Specimen:  Blood from Arm, Left Updated:  02/10/20 2104     WBC 4 44 Thousand/uL      RBC 4 41 Million/uL      Hemoglobin 13 0 g/dL      Hematocrit 39 7 %      MCV 90 fL      MCH 29 5 pg      MCHC 32 7 g/dL      RDW 12 2 %      MPV 9 1 fL      Platelets 591 Thousands/uL      nRBC 0 /100 WBCs      Neutrophils Relative 60 %      Immat GRANS % 0 %      Lymphocytes Relative 30 %      Monocytes Relative 8 %      Eosinophils Relative 1 %      Basophils Relative 1 %      Neutrophils Absolute 2 66 Thousands/µL Immature Grans Absolute 0 01 Thousand/uL      Lymphocytes Absolute 1 34 Thousands/µL      Monocytes Absolute 0 36 Thousand/µL      Eosinophils Absolute 0 05 Thousand/µL      Basophils Absolute 0 02 Thousands/µL                  XR chest 2 views   ED Interpretation by Darron Bennett MD (02/10 2135)   Normal       Final Result by Porfirio Restrepo MD (02/11 5237)      No acute cardiopulmonary disease  Workstation performed: [id number]                    Procedures  ECG 12 Lead Documentation Only  Date/Time: 2/10/2020 9:20 PM  Performed by: Darron Bennett MD  Authorized by: Darron Bennett MD     Indications / Diagnosis:  ACS  ECG reviewed by me, the ED Provider: yes    Patient location:  ED  Previous ECG:     Previous ECG:  Compared to current    Comparison ECG info:   Today    Similarity:  No change  Rate:     ECG rate:  83  Rhythm:     Rhythm: sinus rhythm    Ectopy:     Ectopy: none    QRS:     QRS axis:  Normal  Conduction:     Conduction: normal    ST segments:     ST segments:  Normal  T waves:     T waves: normal               ED Course         HEART Risk Score      Most Recent Value   History  0 Filed at: 02/10/2020 2139   ECG  0 Filed at: 02/10/2020 2139   Age  1 Filed at: 02/10/2020 2139   Risk Factors  0 Filed at: 02/10/2020 2139   Troponin  0 Filed at: 02/10/2020 2139   Heart Score Risk Calculator   History  0 Filed at: 02/10/2020 2139   ECG  0 Filed at: 02/10/2020 2139   Age  1 Filed at: 02/10/2020 2139   Risk Factors  0 Filed at: 02/10/2020 2139   Troponin  0 Filed at: 02/10/2020 2139   HEART Score  1 Filed at: 02/10/2020 2139   HEART Score  1 Filed at: 02/10/2020 2139                            MDM  Number of Diagnoses or Management Options  Chest pressure: new and requires workup  Paresthesia of left arm: new and requires workup  Paresthesia of right arm: new and requires workup  Diagnosis management comments: 68-year-old female presented for evaluation after having had chest pressure followed by bilateral arm paresthesias earlier this morning  Her symptoms have significantly improved  Reports slight tingling in the hands but the chest pressure has completely resolved  Her EKG, lab work were completely unremarkable  Chest x-ray normal   Stable for discharge home, follow-up with PCP or return if worse  Amount and/or Complexity of Data Reviewed  Clinical lab tests: ordered and reviewed  Tests in the radiology section of CPT®: ordered and reviewed  Independent visualization of images, tracings, or specimens: yes    Patient Progress  Patient progress: improved        Disposition  Final diagnoses:   Chest pressure   Paresthesia of left arm   Paresthesia of right arm     Time reflects when diagnosis was documented in both MDM as applicable and the Disposition within this note     Time User Action Codes Description Comment    2/10/2020  9:40 PM [facility] Add [R07 89] Chest pressure     2/10/2020  9:40 PM [address] Add [R20 2] Paresthesia of left arm     2/10/2020  9:40 PM Chidi MARTINEZ Add [R20 2] Paresthesia of right arm       ED Disposition     ED Disposition Condition Date/Time Comment    Discharge Stable Mon Feb 10, 2020  9:39 PM Yadira Reno discharge to home/self care              Follow-up Information     Follow up With Specialties Details Why Contact Info Additional Information    [facility] Emergency Medicine  If symptoms worsen [address]  [facility], [address], [place], [place], [address]          Discharge Medication List as of 2/10/2020  9:41 PM      CONTINUE these medications which have NOT CHANGED    Details   buPROPion (WELLBUTRIN XL) 300 mg 24 hr tablet Take 1 tablet (300 mg total) by mouth every morning, Starting Tue 1/8/2019, Normal      cyclobenzaprine (FLEXERIL) 10 mg tablet Take 1 tablet (10 mg total) by mouth 3 (three) times a day as needed for muscle spasms, Starting Fri 1/31/2020, Normal      liothyronine (CYTOMEL) 25 mcg tablet Take 1 tablet (25 mcg total) by mouth daily for 180 days, Starting Mon 7/1/2019, Until Mon 2/10/2020, Normal      LORazepam (ATIVAN) 0 5 mg tablet Take 1 tablet (0 5 mg total) by mouth 3 (three) times a day, Starting Tue 1/8/2019, Normal      nortriptyline (PAMELOR) 10 mg capsule Take 10 mg by mouth daily at bedtime, Starting Thu 4/18/2019, Historical Med      SUMAtriptan (IMITREX) 100 mg tablet Take 1 tablet (100 mg total) by mouth once as needed for migraine for up to 90 days, Starting Tue 7/30/2019, Until Mon 10/28/2019, Normal      vortioxetine (TRINTELLIX) 10 MG tablet Take 1 tablet (10 mg total) by mouth daily, Starting Tue 1/8/2019, Normal           No discharge procedures on file      ED Provider  Electronically Signed by           Norman Carson MD  02/11/20 9531

## 2020-02-12 LAB
ATRIAL RATE: 88 BPM
P AXIS: 78 DEGREES
PR INTERVAL: 148 MS
QRS AXIS: 60 DEGREES
QRSD INTERVAL: 100 MS
QT INTERVAL: 366 MS
QTC INTERVAL: 442 MS
T WAVE AXIS: 71 DEGREES
VENTRICULAR RATE: 88 BPM

## 2020-02-12 PROCEDURE — 93010 ELECTROCARDIOGRAM REPORT: CPT | Performed by: INTERNAL MEDICINE

## 2020-05-24 DIAGNOSIS — F33.2 MDD (MAJOR DEPRESSIVE DISORDER), RECURRENT SEVERE, WITHOUT PSYCHOSIS (HCC): ICD-10-CM

## 2020-05-26 DIAGNOSIS — F33.2 MDD (MAJOR DEPRESSIVE DISORDER), RECURRENT SEVERE, WITHOUT PSYCHOSIS (HCC): ICD-10-CM

## 2020-05-26 RX ORDER — VORTIOXETINE 10 MG/1
TABLET, FILM COATED ORAL
Qty: 30 TABLET | Refills: 0 | OUTPATIENT
Start: 2020-05-26

## 2020-05-27 RX ORDER — VORTIOXETINE 10 MG/1
TABLET, FILM COATED ORAL
Qty: 30 TABLET | Refills: 0 | OUTPATIENT
Start: 2020-05-27

## 2020-07-14 DIAGNOSIS — F33.2 MDD (MAJOR DEPRESSIVE DISORDER), RECURRENT SEVERE, WITHOUT PSYCHOSIS (HCC): ICD-10-CM

## 2020-07-15 DIAGNOSIS — F33.2 MDD (MAJOR DEPRESSIVE DISORDER), RECURRENT SEVERE, WITHOUT PSYCHOSIS (HCC): ICD-10-CM

## 2020-07-15 RX ORDER — LIOTHYRONINE SODIUM 25 UG/1
TABLET ORAL
Qty: 90 TABLET | Refills: 0 | Status: SHIPPED | OUTPATIENT
Start: 2020-07-15

## 2020-07-16 RX ORDER — LIOTHYRONINE SODIUM 25 UG/1
TABLET ORAL
Qty: 90 TABLET | Refills: 0 | OUTPATIENT
Start: 2020-07-16

## 2020-09-05 DIAGNOSIS — F33.2 MDD (MAJOR DEPRESSIVE DISORDER), RECURRENT SEVERE, WITHOUT PSYCHOSIS (HCC): ICD-10-CM

## 2020-10-07 RX ORDER — NORTRIPTYLINE HYDROCHLORIDE 10 MG/1
CAPSULE ORAL
Qty: 90 CAPSULE | Refills: 0 | OUTPATIENT
Start: 2020-10-07

## 2021-01-04 DIAGNOSIS — G43.009 MIGRAINE WITHOUT AURA AND WITHOUT STATUS MIGRAINOSUS, NOT INTRACTABLE: Primary | ICD-10-CM

## 2021-01-04 RX ORDER — SUMATRIPTAN 100 MG/1
100 TABLET, FILM COATED ORAL 2 TIMES DAILY PRN
Qty: 27 TABLET | Refills: 1 | Status: SHIPPED | OUTPATIENT
Start: 2021-01-04 | End: 2021-03-15

## 2021-01-23 DIAGNOSIS — Z23 ENCOUNTER FOR IMMUNIZATION: ICD-10-CM

## 2021-02-23 DIAGNOSIS — M62.838 CERVICAL PARASPINAL MUSCLE SPASM: ICD-10-CM

## 2021-02-23 RX ORDER — CYCLOBENZAPRINE HCL 10 MG
10 TABLET ORAL 3 TIMES DAILY PRN
Qty: 60 TABLET | Refills: 1 | Status: SHIPPED | OUTPATIENT
Start: 2021-02-23

## 2021-03-12 DIAGNOSIS — G43.009 MIGRAINE WITHOUT AURA AND WITHOUT STATUS MIGRAINOSUS, NOT INTRACTABLE: ICD-10-CM

## 2021-03-15 RX ORDER — SUMATRIPTAN 100 MG/1
TABLET, FILM COATED ORAL
Qty: 18 TABLET | Refills: 0 | Status: SHIPPED | OUTPATIENT
Start: 2021-03-15 | End: 2021-06-17 | Stop reason: SDUPTHER

## 2021-04-29 DIAGNOSIS — R60.0 PERIORBITAL EDEMA OF BOTH EYES: Primary | ICD-10-CM

## 2021-04-29 RX ORDER — METHYLPREDNISOLONE 4 MG/1
TABLET ORAL
Qty: 1 EACH | Refills: 0 | Status: SHIPPED | OUTPATIENT
Start: 2021-04-29 | End: 2021-05-12 | Stop reason: SDUPTHER

## 2021-05-12 DIAGNOSIS — R60.0 PERIORBITAL EDEMA OF BOTH EYES: ICD-10-CM

## 2021-05-12 RX ORDER — METHYLPREDNISOLONE 4 MG/1
TABLET ORAL
Qty: 1 EACH | Refills: 0 | Status: SHIPPED | OUTPATIENT
Start: 2021-05-12 | End: 2021-05-27 | Stop reason: ALTCHOICE

## 2021-05-27 DIAGNOSIS — R60.0 PERIORBITAL EDEMA OF BOTH EYES: Primary | ICD-10-CM

## 2021-05-27 RX ORDER — PREDNISONE 10 MG/1
10 TABLET ORAL DAILY
Qty: 90 TABLET | Refills: 1 | Status: SHIPPED | OUTPATIENT
Start: 2021-05-27

## 2021-07-23 PROBLEM — L23.9 CUTANEOUS HYPERSENSITIVITY: Status: ACTIVE | Noted: 2021-07-23

## 2021-07-28 ENCOUNTER — APPOINTMENT (OUTPATIENT)
Dept: LAB | Facility: CLINIC | Age: 61
End: 2021-07-28
Payer: COMMERCIAL

## 2021-07-28 DIAGNOSIS — Z00.8 ENCOUNTER FOR OTHER GENERAL EXAMINATION: ICD-10-CM

## 2021-07-28 DIAGNOSIS — R60.0 PERIORBITAL EDEMA OF BOTH EYES: ICD-10-CM

## 2021-07-28 DIAGNOSIS — T50.B95A ADVERSE REACTION TO COVID-19 VACCINE: ICD-10-CM

## 2021-07-28 DIAGNOSIS — L23.9 CUTANEOUS HYPERSENSITIVITY: ICD-10-CM

## 2021-07-28 LAB
ALBUMIN SERPL BCP-MCNC: 3.8 G/DL (ref 3.5–5)
ALP SERPL-CCNC: 73 U/L (ref 46–116)
ALT SERPL W P-5'-P-CCNC: 31 U/L (ref 12–78)
ANION GAP SERPL CALCULATED.3IONS-SCNC: 4 MMOL/L (ref 4–13)
AST SERPL W P-5'-P-CCNC: 21 U/L (ref 5–45)
BASOPHILS # BLD AUTO: 0.04 THOUSANDS/ΜL (ref 0–0.1)
BASOPHILS NFR BLD AUTO: 1 % (ref 0–1)
BILIRUB SERPL-MCNC: 0.5 MG/DL (ref 0.2–1)
BUN SERPL-MCNC: 12 MG/DL (ref 5–25)
CALCIUM SERPL-MCNC: 9.1 MG/DL (ref 8.3–10.1)
CHLORIDE SERPL-SCNC: 98 MMOL/L (ref 100–108)
CHOLEST SERPL-MCNC: 220 MG/DL (ref 50–200)
CK SERPL-CCNC: 62 U/L (ref 26–192)
CO2 SERPL-SCNC: 28 MMOL/L (ref 21–32)
CREAT SERPL-MCNC: 0.67 MG/DL (ref 0.6–1.3)
CRP SERPL QL: 3.5 MG/L
EOSINOPHIL # BLD AUTO: 0.18 THOUSAND/ΜL (ref 0–0.61)
EOSINOPHIL NFR BLD AUTO: 6 % (ref 0–6)
ERYTHROCYTE [DISTWIDTH] IN BLOOD BY AUTOMATED COUNT: 12.3 % (ref 11.6–15.1)
EST. AVERAGE GLUCOSE BLD GHB EST-MCNC: 114 MG/DL
GFR SERPL CREATININE-BSD FRML MDRD: 95 ML/MIN/1.73SQ M
GLUCOSE P FAST SERPL-MCNC: 81 MG/DL (ref 65–99)
HBA1C MFR BLD: 5.6 %
HCT VFR BLD AUTO: 36.5 % (ref 34.8–46.1)
HDLC SERPL-MCNC: 66 MG/DL
HGB BLD-MCNC: 11.9 G/DL (ref 11.5–15.4)
IGA SERPL-MCNC: 98 MG/DL (ref 70–400)
IGG SERPL-MCNC: 889 MG/DL (ref 700–1600)
IGM SERPL-MCNC: 76 MG/DL (ref 40–230)
IMM GRANULOCYTES # BLD AUTO: 0.01 THOUSAND/UL (ref 0–0.2)
IMM GRANULOCYTES NFR BLD AUTO: 0 % (ref 0–2)
LDLC SERPL CALC-MCNC: 133 MG/DL (ref 0–100)
LYMPHOCYTES # BLD AUTO: 0.98 THOUSANDS/ΜL (ref 0.6–4.47)
LYMPHOCYTES NFR BLD AUTO: 32 % (ref 14–44)
MCH RBC QN AUTO: 29 PG (ref 26.8–34.3)
MCHC RBC AUTO-ENTMCNC: 32.6 G/DL (ref 31.4–37.4)
MCV RBC AUTO: 89 FL (ref 82–98)
MONOCYTES # BLD AUTO: 0.32 THOUSAND/ΜL (ref 0.17–1.22)
MONOCYTES NFR BLD AUTO: 10 % (ref 4–12)
NEUTROPHILS # BLD AUTO: 1.56 THOUSANDS/ΜL (ref 1.85–7.62)
NEUTS SEG NFR BLD AUTO: 51 % (ref 43–75)
NONHDLC SERPL-MCNC: 154 MG/DL
NRBC BLD AUTO-RTO: 0 /100 WBCS
PLATELET # BLD AUTO: 188 THOUSANDS/UL (ref 149–390)
PMV BLD AUTO: 9.4 FL (ref 8.9–12.7)
POTASSIUM SERPL-SCNC: 4.4 MMOL/L (ref 3.5–5.3)
PROT SERPL-MCNC: 7.4 G/DL (ref 6.4–8.2)
RBC # BLD AUTO: 4.11 MILLION/UL (ref 3.81–5.12)
SODIUM SERPL-SCNC: 130 MMOL/L (ref 136–145)
TRIGL SERPL-MCNC: 104 MG/DL
WBC # BLD AUTO: 3.09 THOUSAND/UL (ref 4.31–10.16)

## 2021-07-28 PROCEDURE — 82785 ASSAY OF IGE: CPT

## 2021-07-28 PROCEDURE — 82784 ASSAY IGA/IGD/IGG/IGM EACH: CPT

## 2021-07-28 PROCEDURE — 80061 LIPID PANEL: CPT

## 2021-07-28 PROCEDURE — 83036 HEMOGLOBIN GLYCOSYLATED A1C: CPT

## 2021-07-28 PROCEDURE — 85025 COMPLETE CBC W/AUTO DIFF WBC: CPT

## 2021-07-28 PROCEDURE — 80053 COMPREHEN METABOLIC PANEL: CPT

## 2021-07-28 PROCEDURE — 36415 COLL VENOUS BLD VENIPUNCTURE: CPT

## 2021-07-28 PROCEDURE — 83520 IMMUNOASSAY QUANT NOS NONAB: CPT

## 2021-07-28 PROCEDURE — 86140 C-REACTIVE PROTEIN: CPT

## 2021-07-28 PROCEDURE — 82550 ASSAY OF CK (CPK): CPT

## 2021-07-29 LAB — TOTAL IGE SMQN RAST: 4.6 KU/L (ref 0–113)

## 2021-07-30 LAB — TRYPTASE SERPL-MCNC: 4.9 UG/L (ref 2.2–13.2)

## 2022-06-19 DIAGNOSIS — G43.009 MIGRAINE WITHOUT AURA AND WITHOUT STATUS MIGRAINOSUS, NOT INTRACTABLE: ICD-10-CM

## 2022-06-19 RX ORDER — SUMATRIPTAN 100 MG/1
100 TABLET, FILM COATED ORAL 2 TIMES DAILY PRN
Qty: 27 TABLET | Refills: 3 | Status: SHIPPED | OUTPATIENT
Start: 2022-06-19 | End: 2023-06-14

## 2022-07-14 DIAGNOSIS — G43.001 MIGRAINE WITHOUT AURA AND WITH STATUS MIGRAINOSUS, NOT INTRACTABLE: Primary | ICD-10-CM

## 2022-07-14 RX ORDER — ELETRIPTAN HYDROBROMIDE 40 MG/1
40 TABLET, FILM COATED ORAL ONCE AS NEEDED
Qty: 18 TABLET | Refills: 1 | Status: SHIPPED | OUTPATIENT
Start: 2022-07-14

## 2022-07-18 DIAGNOSIS — K04.7 DENTAL INFECTION: Primary | ICD-10-CM

## 2022-07-18 RX ORDER — AMOXICILLIN 875 MG/1
875 TABLET, COATED ORAL 2 TIMES DAILY
Qty: 20 TABLET | Refills: 1 | Status: SHIPPED | OUTPATIENT
Start: 2022-07-18 | End: 2022-08-07

## 2022-08-19 ENCOUNTER — APPOINTMENT (OUTPATIENT)
Dept: LAB | Facility: AMBULARY SURGERY CENTER | Age: 62
End: 2022-08-19

## 2022-08-19 DIAGNOSIS — Z00.8 HEALTH EXAMINATION IN POPULATION SURVEY: ICD-10-CM

## 2022-08-19 LAB
CHOLEST SERPL-MCNC: 198 MG/DL
EST. AVERAGE GLUCOSE BLD GHB EST-MCNC: 111 MG/DL
HBA1C MFR BLD: 5.5 %
HDLC SERPL-MCNC: 56 MG/DL
LDLC SERPL CALC-MCNC: 104 MG/DL (ref 0–100)
NONHDLC SERPL-MCNC: 142 MG/DL
TRIGL SERPL-MCNC: 189 MG/DL

## 2022-08-19 PROCEDURE — 36415 COLL VENOUS BLD VENIPUNCTURE: CPT

## 2022-08-19 PROCEDURE — 83036 HEMOGLOBIN GLYCOSYLATED A1C: CPT

## 2022-08-19 PROCEDURE — 80061 LIPID PANEL: CPT

## 2022-08-21 ENCOUNTER — DOCUMENTATION (OUTPATIENT)
Dept: INTERNAL MEDICINE CLINIC | Facility: OTHER | Age: 62
End: 2022-08-21

## 2022-08-21 VITALS
BODY MASS INDEX: 25.39 KG/M2 | HEIGHT: 66 IN | SYSTOLIC BLOOD PRESSURE: 110 MMHG | DIASTOLIC BLOOD PRESSURE: 60 MMHG | WEIGHT: 158 LBS

## 2022-09-26 DIAGNOSIS — L63.9 ALOPECIA AREATA: Primary | ICD-10-CM

## 2022-09-26 RX ORDER — CLOBETASOL PROPIONATE 0.46 MG/ML
SOLUTION TOPICAL 2 TIMES DAILY
Qty: 50 ML | Refills: 0 | Status: SHIPPED | OUTPATIENT
Start: 2022-09-26

## 2022-09-26 RX ORDER — CLOBETASOL PROPIONATE 0.46 MG/ML
SOLUTION TOPICAL 2 TIMES DAILY
Qty: 50 ML | Refills: 0 | Status: SHIPPED | OUTPATIENT
Start: 2022-09-26 | End: 2022-09-26 | Stop reason: SDUPTHER

## 2022-11-22 ENCOUNTER — IMMUNIZATIONS (OUTPATIENT)
Dept: INTERNAL MEDICINE CLINIC | Facility: CLINIC | Age: 62
End: 2022-11-22

## 2022-11-22 DIAGNOSIS — Z23 NEED FOR INFLUENZA VACCINATION: Primary | ICD-10-CM

## 2022-12-08 ENCOUNTER — APPOINTMENT (OUTPATIENT)
Dept: LAB | Facility: AMBULARY SURGERY CENTER | Age: 62
End: 2022-12-08

## 2022-12-08 DIAGNOSIS — R79.89 LOW THYROID STIMULATING HORMONE (TSH) LEVEL: ICD-10-CM

## 2022-12-08 DIAGNOSIS — F33.0 MAJOR DEPRESSIVE DISORDER, RECURRENT EPISODE, MILD (HCC): ICD-10-CM

## 2022-12-08 DIAGNOSIS — E03.9 HYPOTHYROIDISM (ACQUIRED): Primary | ICD-10-CM

## 2022-12-08 LAB
25(OH)D3 SERPL-MCNC: 28.6 NG/ML (ref 30–100)
ALBUMIN SERPL BCP-MCNC: 4 G/DL (ref 3.5–5)
ALP SERPL-CCNC: 90 U/L (ref 46–116)
ALT SERPL W P-5'-P-CCNC: 28 U/L (ref 12–78)
ANION GAP SERPL CALCULATED.3IONS-SCNC: 5 MMOL/L (ref 4–13)
AST SERPL W P-5'-P-CCNC: 20 U/L (ref 5–45)
BASOPHILS # BLD AUTO: 0.03 THOUSANDS/ÂΜL (ref 0–0.1)
BASOPHILS NFR BLD AUTO: 1 % (ref 0–1)
BILIRUB SERPL-MCNC: 0.6 MG/DL (ref 0.2–1)
BUN SERPL-MCNC: 25 MG/DL (ref 5–25)
CALCIUM SERPL-MCNC: 9.3 MG/DL (ref 8.3–10.1)
CHLORIDE SERPL-SCNC: 103 MMOL/L (ref 96–108)
CO2 SERPL-SCNC: 25 MMOL/L (ref 21–32)
CREAT SERPL-MCNC: 0.83 MG/DL (ref 0.6–1.3)
EOSINOPHIL # BLD AUTO: 0.18 THOUSAND/ÂΜL (ref 0–0.61)
EOSINOPHIL NFR BLD AUTO: 5 % (ref 0–6)
ERYTHROCYTE [DISTWIDTH] IN BLOOD BY AUTOMATED COUNT: 12.3 % (ref 11.6–15.1)
FSH SERPL-ACNC: 57 MIU/ML
GFR SERPL CREATININE-BSD FRML MDRD: 75 ML/MIN/1.73SQ M
GLUCOSE P FAST SERPL-MCNC: 92 MG/DL (ref 65–99)
HCT VFR BLD AUTO: 36.7 % (ref 34.8–46.1)
HGB BLD-MCNC: 11.7 G/DL (ref 11.5–15.4)
IMM GRANULOCYTES # BLD AUTO: 0.01 THOUSAND/UL (ref 0–0.2)
IMM GRANULOCYTES NFR BLD AUTO: 0 % (ref 0–2)
LH SERPL-ACNC: 18.5 MIU/ML
LYMPHOCYTES # BLD AUTO: 1.11 THOUSANDS/ÂΜL (ref 0.6–4.47)
LYMPHOCYTES NFR BLD AUTO: 31 % (ref 14–44)
MCH RBC QN AUTO: 28.5 PG (ref 26.8–34.3)
MCHC RBC AUTO-ENTMCNC: 31.9 G/DL (ref 31.4–37.4)
MCV RBC AUTO: 89 FL (ref 82–98)
MONOCYTES # BLD AUTO: 0.28 THOUSAND/ÂΜL (ref 0.17–1.22)
MONOCYTES NFR BLD AUTO: 8 % (ref 4–12)
NEUTROPHILS # BLD AUTO: 1.92 THOUSANDS/ÂΜL (ref 1.85–7.62)
NEUTS SEG NFR BLD AUTO: 55 % (ref 43–75)
NRBC BLD AUTO-RTO: 0 /100 WBCS
PLATELET # BLD AUTO: 197 THOUSANDS/UL (ref 149–390)
PMV BLD AUTO: 9.7 FL (ref 8.9–12.7)
POTASSIUM SERPL-SCNC: 4.3 MMOL/L (ref 3.5–5.3)
PROT SERPL-MCNC: 7.1 G/DL (ref 6.4–8.4)
RBC # BLD AUTO: 4.11 MILLION/UL (ref 3.81–5.12)
SODIUM SERPL-SCNC: 133 MMOL/L (ref 135–147)
T4 FREE SERPL-MCNC: 0.34 NG/DL (ref 0.76–1.46)
TSH SERPL DL<=0.05 MIU/L-ACNC: 1.01 UIU/ML (ref 0.45–4.5)
WBC # BLD AUTO: 3.53 THOUSAND/UL (ref 4.31–10.16)

## 2022-12-08 RX ORDER — VORTIOXETINE 20 MG/1
20 TABLET, FILM COATED ORAL DAILY
COMMUNITY
Start: 2022-12-06

## 2022-12-09 DIAGNOSIS — E03.2 HYPOTHYROIDISM DUE TO MEDICATION: Primary | ICD-10-CM

## 2022-12-09 RX ORDER — LEVOTHYROXINE SODIUM 0.05 MG/1
50 TABLET ORAL DAILY
Qty: 90 TABLET | Refills: 1 | Status: SHIPPED | OUTPATIENT
Start: 2022-12-09

## 2022-12-11 LAB — ESTROGEN SERPL-MCNC: 93 PG/ML (ref 40–244)

## 2023-01-26 ENCOUNTER — DOCUMENTATION (OUTPATIENT)
Dept: GERIATRICS | Facility: OTHER | Age: 63
End: 2023-01-26

## 2023-01-26 ENCOUNTER — APPOINTMENT (OUTPATIENT)
Dept: LAB | Facility: AMBULARY SURGERY CENTER | Age: 63
End: 2023-01-26

## 2023-01-26 DIAGNOSIS — E03.2 HYPOTHYROIDISM DUE TO MEDICATION: Primary | ICD-10-CM

## 2023-01-26 DIAGNOSIS — E03.2 HYPOTHYROIDISM DUE TO MEDICATION: ICD-10-CM

## 2023-01-26 LAB
T3 SERPL-MCNC: 1 NG/ML (ref 0.6–1.8)
T4 FREE SERPL-MCNC: 0.94 NG/DL (ref 0.76–1.46)
TSH SERPL DL<=0.05 MIU/L-ACNC: 1.29 UIU/ML (ref 0.45–4.5)

## 2023-02-21 DIAGNOSIS — Z12.31 ENCOUNTER FOR SCREENING MAMMOGRAM FOR BREAST CANCER: ICD-10-CM

## 2023-02-21 DIAGNOSIS — Z78.0 ENCOUNTER FOR OSTEOPOROSIS SCREENING IN ASYMPTOMATIC POSTMENOPAUSAL PATIENT: Primary | ICD-10-CM

## 2023-02-21 DIAGNOSIS — Z13.820 ENCOUNTER FOR OSTEOPOROSIS SCREENING IN ASYMPTOMATIC POSTMENOPAUSAL PATIENT: Primary | ICD-10-CM

## 2023-03-07 ENCOUNTER — HOSPITAL ENCOUNTER (OUTPATIENT)
Dept: BONE DENSITY | Facility: CLINIC | Age: 63
Discharge: HOME | End: 2023-03-07

## 2023-03-07 VITALS — HEIGHT: 64 IN | BODY MASS INDEX: 27.12 KG/M2

## 2023-03-07 DIAGNOSIS — Z78.0 ENCOUNTER FOR OSTEOPOROSIS SCREENING IN ASYMPTOMATIC POSTMENOPAUSAL PATIENT: ICD-10-CM

## 2023-03-07 DIAGNOSIS — Z13.820 ENCOUNTER FOR OSTEOPOROSIS SCREENING IN ASYMPTOMATIC POSTMENOPAUSAL PATIENT: ICD-10-CM

## 2023-03-14 ENCOUNTER — OFFICE VISIT (OUTPATIENT)
Dept: OBGYN CLINIC | Facility: CLINIC | Age: 63
End: 2023-03-14

## 2023-03-14 VITALS
HEIGHT: 65 IN | WEIGHT: 168.6 LBS | SYSTOLIC BLOOD PRESSURE: 106 MMHG | BODY MASS INDEX: 28.09 KG/M2 | DIASTOLIC BLOOD PRESSURE: 50 MMHG

## 2023-03-14 DIAGNOSIS — F33.2 SEVERE EPISODE OF RECURRENT MAJOR DEPRESSIVE DISORDER, WITHOUT PSYCHOTIC FEATURES (HCC): ICD-10-CM

## 2023-03-14 DIAGNOSIS — N95.1 MENOPAUSAL SYMPTOMS: Primary | ICD-10-CM

## 2023-03-14 DIAGNOSIS — G47.9 SLEEP DISTURBANCE: ICD-10-CM

## 2023-03-14 RX ORDER — PROGESTERONE 100 MG/1
100 CAPSULE ORAL
Qty: 30 CAPSULE | Refills: 11 | Status: SHIPPED | OUTPATIENT
Start: 2023-03-14

## 2023-03-14 RX ORDER — ESTRADIOL 0.05 MG/D
1 FILM, EXTENDED RELEASE TRANSDERMAL 2 TIMES WEEKLY
Qty: 8 PATCH | Refills: 11 | Status: SHIPPED | OUTPATIENT
Start: 2023-03-16

## 2023-03-15 NOTE — PROGRESS NOTES
Assessment/Plan:       Diagnoses and all orders for this visit:    Menopausal symptoms  -     estradiol (Soniya) 0 05 MG/24HR; Place 1 patch on the skin 2 (two) times a week  -     Progesterone 100 MG CAPS; Take 100 mg by mouth at bedtime    Sleep disturbance  -     BIPAP Study; Future    Severe episode of recurrent major depressive disorder, without psychotic features ([facility] [address] )  -     BIPAP Study; Future        1) Reassurance given that she still can derive great benefit with HRT with regard to symptom relief as well as tissue protection, especially of the bone and vagina  2) Controversies surrounding estrogen based HRT discussed, including the fear based off the [facility] trials concerning Prempro  I will not be prescribing Prempro  One should not assume all therapies confer the same risk or benefit  Stay tuned to the REPLENISH trials for E2/PG therapy in the [place] with Bijuva, but European trials with same medication point to safety and efficacy with superior health outcomes with women on this type of HRT  3) I discussed the long term health benefits of E2/PG, including: reduction of CVD risk, reduction of hyperlipidemia, reduction of DM and GLP-1 agonist activity with mild appetite suppression; reduction of colon CA, osteoporosis and cognitive decline, Alzheimer's disease  4) The best time to target CVD and atherosclerosis is within the first 5 years of menopause with oral estrogen  therapy  Unfortunately, this protective time frame has been lost, with AMI risk mildly increasing within first year of oral therapy use in both [facility] and  trials  This risk is mitigated with transdermal therapy  Oral progesterone still preferred, no CVD or VTE risk  5) Therefore, I recommend we start with transdermal estradiol 0 5 mg twice weekly with oral progesterone 100 mg nightly  Side effects of HRT reviewed including vaginal bleeding, breast tenderness and somnolence, easily remedied with dose adjustment     6) Reviewed bone DEXA results, including NOF threshold for treatment, ie > 3% risk of vertebral fracture, or >20% risk of major ostoporotic fracture  Her values are 2 4% and 16% respectively  I warned her that estradiol based HRT will not likely reverse these values greatly but will stabilize them and still reduce fracture risk  7) Follow up by email in one month as to how she is feeling on medical regimen  8) Finally, sleep study to rule out sleep apnea ordered per patient request     This was a 60 minute visit with greater than 50% of time spent in face to face counseling and coordination of care  Subjective:      Patient ID: Noemi Ospina is a 58 y o  female  Cherry Heard presents for hormone consult,her first visit with me; self referred, PCP is her , Dr Starr North has been menopausal for over a decade and originally did not have any problems until recently  She wonders "is she too late to take hormone replacement?" She complains of:  1) Hot flash, more like feeling hot all the time, not just flashing  2) Weight gain of 20 lbs over the past couple years, most noted in her breasts and mid abdomen  3) Depression, severe, treatment with transmagnetic stimulator was "miraculous"  4) Osteoporosis in spine by bone DEXA, Tscore -2 9  5) Sleep disturbance,  notes increased snoring  She feels like she may have apnea  PMHX: as above; s/p NAYE age 48 for uterine fibroids, no follow up with gyn since; hx of migraine and muscle tension HA; s/p recent hypothyroidism dx  FHX: Mom alive at 80, HTN, hypothyroid  MGF AMI age 48; MGM AMI 76  Dad COD multiple myeloma 76; PGM/PGF both with CVD  2 siblings, brother with AAA, stable  3 kids, twin daughters age 34, one son 22, healthy        Review of Systems   Constitutional: Positive for fatigue and unexpected weight change  HENT:        Snoring   Respiratory: Positive for apnea  Gastrointestinal: Negative  Endocrine: Positive for heat intolerance  Genitourinary: Negative  Musculoskeletal: Negative  Negative for back pain  Neurological: Positive for headaches  Psychiatric/Behavioral: Positive for decreased concentration, dysphoric mood and sleep disturbance  Objective:      /50 (BP Location: Left arm, Patient Position: Sitting, Cuff Size: Standard)   Ht 5' 5" (1 651 m)   Wt 76 5 kg (168 lb 9 6 oz)   BMI 28 06 kg/m²          Physical Exam  Constitutional:       Appearance: Normal appearance  Neurological:      Mental Status: She is alert

## 2023-03-23 DIAGNOSIS — R51.9 FREQUENT HEADACHES: ICD-10-CM

## 2023-03-23 DIAGNOSIS — F33.2 MDD (MAJOR DEPRESSIVE DISORDER), RECURRENT SEVERE, WITHOUT PSYCHOSIS (HCC): ICD-10-CM

## 2023-03-23 DIAGNOSIS — R06.83 HABITUAL SNORING: Primary | ICD-10-CM

## 2023-03-28 ENCOUNTER — TELEPHONE (OUTPATIENT)
Dept: SLEEP CENTER | Facility: CLINIC | Age: 63
End: 2023-03-28

## 2023-03-28 NOTE — TELEPHONE ENCOUNTER
----- Message from Parker Obrien MD sent at 3/28/2023  8:38 AM EDT -----  Approved    ----- Message -----  From: Sandra Chau  Sent: 7/30/7856   7:50 AM EDT  To: Sleep Medicine [place] Provider    This home sleep study needs approval      If approved please sign and return to clerical pool  If denied please include reasons why  Also provide alternative testing if warranted  Please sign and return to clerical pool

## 2023-04-24 ENCOUNTER — HOSPITAL ENCOUNTER (OUTPATIENT)
Dept: RADIOLOGY | Age: 63
Discharge: HOME | End: 2023-04-24

## 2023-04-24 VITALS — BODY MASS INDEX: 27.66 KG/M2 | WEIGHT: 166 LBS | HEIGHT: 65 IN

## 2023-04-24 DIAGNOSIS — Z13.820 ENCOUNTER FOR OSTEOPOROSIS SCREENING IN ASYMPTOMATIC POSTMENOPAUSAL PATIENT: ICD-10-CM

## 2023-04-24 DIAGNOSIS — Z12.31 ENCOUNTER FOR SCREENING MAMMOGRAM FOR BREAST CANCER: ICD-10-CM

## 2023-04-24 DIAGNOSIS — Z78.0 ENCOUNTER FOR OSTEOPOROSIS SCREENING IN ASYMPTOMATIC POSTMENOPAUSAL PATIENT: ICD-10-CM

## 2023-05-05 ENCOUNTER — HOSPITAL ENCOUNTER (OUTPATIENT)
Dept: SLEEP CENTER | Facility: CLINIC | Age: 63
Discharge: HOME | End: 2023-05-05

## 2023-05-05 DIAGNOSIS — R06.83 HABITUAL SNORING: ICD-10-CM

## 2023-05-05 DIAGNOSIS — R51.9 FREQUENT HEADACHES: ICD-10-CM

## 2023-05-05 DIAGNOSIS — F33.2 MDD (MAJOR DEPRESSIVE DISORDER), RECURRENT SEVERE, WITHOUT PSYCHOSIS (HCC): ICD-10-CM

## 2023-05-10 NOTE — PROGRESS NOTES
Home Sleep Study Documentation    HOME STUDY DEVICE: Noxturnal no                                           Paty G3 yes      Pre-Sleep Home Study:    Set-up and instructions performed by: home study tech    Technician performed demonstration for Patient: yes    Return demonstration performed by Patient: yes    Written instructions provided to Patient: yes    Patient signed consent form: yes        Post-Sleep Home Study:    Additional comments by Patient: none    Home Sleep Study Failed:no:    Failure reason: N/A    Reported or Detected: N/A    Scored by: OREN Chin

## 2023-05-18 ENCOUNTER — HOSPITAL ENCOUNTER (OUTPATIENT)
Dept: ULTRASOUND IMAGING | Facility: CLINIC | Age: 63
End: 2023-05-18

## 2023-05-18 ENCOUNTER — HOSPITAL ENCOUNTER (OUTPATIENT)
Dept: MAMMOGRAPHY | Facility: CLINIC | Age: 63
End: 2023-05-18

## 2023-05-18 DIAGNOSIS — R92.8 ABNORMAL SCREENING MAMMOGRAM: ICD-10-CM

## 2023-05-18 NOTE — PROGRESS NOTES
Met with patient and Dr General Shaffer  regarding recommendation for;    _____ RIGHT ___X___LEFT      __X___Ultrasound guided  ______Stereotactic breast biopsy  __X___Verbalized understanding        Blood thinners:  No: __X___ Yes: ______ What:                 Biopsy teaching sheet given:  Yes: ___X___ No: ________    Pt given contact information and adv to call with any questions/needs

## 2023-05-19 ENCOUNTER — TELEPHONE (OUTPATIENT)
Dept: SLEEP CENTER | Facility: CLINIC | Age: 63
End: 2023-05-19

## 2023-05-19 NOTE — TELEPHONE ENCOUNTER
Called patient and advised sleep study resulted and shows mild sleep apnea  Per study order, Dr Paz Gross requested follow up with sleep specialist      Provided patient with phone number for Dr Tanner Howard office to call to schedule sleep consult

## 2023-05-24 NOTE — PSYCH
Treatment Plan Tracking    #1 Treatment Plan not completed within required time limits due to: Client presented with emotional/behavioral issues that required clinical intervention            Signatures   Electronically signed by : Rosa Johnston LCSW; Jan 9 2018  8:30AM EST                       (Author) Information: Selecting Yes will display possible errors in your note based on the variables you have selected. This validation is only offered as a suggestion for you. PLEASE NOTE THAT THE VALIDATION TEXT WILL BE REMOVED WHEN YOU FINALIZE YOUR NOTE. IF YOU WANT TO FAX A PRELIMINARY NOTE YOU WILL NEED TO TOGGLE THIS TO 'NO' IF YOU DO NOT WANT IT IN YOUR FAXED NOTE.

## 2023-05-31 ENCOUNTER — DOCUMENTATION (OUTPATIENT)
Dept: INTERNAL MEDICINE CLINIC | Facility: CLINIC | Age: 63
End: 2023-05-31

## 2023-05-31 DIAGNOSIS — E03.2 HYPOTHYROIDISM DUE TO MEDICATION: Primary | ICD-10-CM

## 2023-05-31 RX ORDER — LEVOTHYROXINE SODIUM 0.05 MG/1
50 TABLET ORAL DAILY
Qty: 90 TABLET | Refills: 3 | Status: SHIPPED | OUTPATIENT
Start: 2023-05-31 | End: 2024-05-30

## 2023-06-12 ENCOUNTER — HOSPITAL ENCOUNTER (OUTPATIENT)
Dept: MAMMOGRAPHY | Facility: CLINIC | Age: 63
Discharge: HOME | End: 2023-06-12

## 2023-06-12 ENCOUNTER — HOSPITAL ENCOUNTER (OUTPATIENT)
Dept: ULTRASOUND IMAGING | Facility: CLINIC | Age: 63
Discharge: HOME | End: 2023-06-12
Admitting: RADIOLOGY
Payer: COMMERCIAL

## 2023-06-12 VITALS — DIASTOLIC BLOOD PRESSURE: 79 MMHG | SYSTOLIC BLOOD PRESSURE: 120 MMHG | HEART RATE: 88 BPM

## 2023-06-12 DIAGNOSIS — R92.8 ABNORMAL MAMMOGRAM: ICD-10-CM

## 2023-06-12 PROCEDURE — 19083 BX BREAST 1ST LESION US IMAG: CPT

## 2023-06-12 PROCEDURE — A4648 IMPLANTABLE TISSUE MARKER: HCPCS

## 2023-06-12 PROCEDURE — 88341 IMHCHEM/IMCYTCHM EA ADD ANTB: CPT | Performed by: STUDENT IN AN ORGANIZED HEALTH CARE EDUCATION/TRAINING PROGRAM

## 2023-06-12 PROCEDURE — 19084 BX BREAST ADD LESION US IMAG: CPT

## 2023-06-12 PROCEDURE — 88342 IMHCHEM/IMCYTCHM 1ST ANTB: CPT | Performed by: STUDENT IN AN ORGANIZED HEALTH CARE EDUCATION/TRAINING PROGRAM

## 2023-06-12 PROCEDURE — 88305 TISSUE EXAM BY PATHOLOGIST: CPT | Performed by: STUDENT IN AN ORGANIZED HEALTH CARE EDUCATION/TRAINING PROGRAM

## 2023-06-12 RX ORDER — LIDOCAINE HYDROCHLORIDE 10 MG/ML
5 INJECTION, SOLUTION EPIDURAL; INFILTRATION; INTRACAUDAL; PERINEURAL ONCE
Status: COMPLETED | OUTPATIENT
Start: 2023-06-12 | End: 2023-06-12

## 2023-06-12 RX ADMIN — LIDOCAINE HYDROCHLORIDE 5 ML: 10 INJECTION, SOLUTION EPIDURAL; INFILTRATION; INTRACAUDAL; PERINEURAL at 14:17

## 2023-06-12 RX ADMIN — LIDOCAINE HYDROCHLORIDE 5 ML: 10 INJECTION, SOLUTION EPIDURAL; INFILTRATION; INTRACAUDAL; PERINEURAL at 14:06

## 2023-06-12 NOTE — PROGRESS NOTES
Procedure type:    __x___ultrasound guided _____stereotactic    Breast:    __x___Left _____Right    Location: 9  o'clock 3 cmfn    Needle: 12 gauge nadira    # of passes: 5     Clip: Hydromark butterfly     Performed by: Dr Brionna Gerber held for 5 minutes by: Amira Isabel     Steri Strips:    ___x__yes _____no    Band aid:    __x___yes_____no    Tape and guaze:    _____yes __x__no    Tolerated procedure:    __x___yes _____no

## 2023-06-12 NOTE — PROGRESS NOTES
Ice pack given:    ___x__yes _____no    Discharge instructions signed by patient:    _____yes __x___no    Discharge instructions given to patient:    ___x__yes _____no    Discharged via:    __x___ambulatory    _____wheelchair    _____stretcher    Stable on discharge:    ___x__yes ____no  Patient made aware of potential bloody nipple discharge  Patient would like results over the phone  Ok to leave a message

## 2023-06-12 NOTE — PROGRESS NOTES
Procedure type:    __x___ultrasound guided _____stereotactic    Breast:    ___x__Left _____Right    Location: 7 o'clock 6 cmfn    Needle: 12 gauge nadira     # of passes: 3    Clip: Heart     Performed by: Dr García Sink held for 5 minutes by: Aissatou Heart     Steri Strips:    __x___yes _____no    Band aid:    ___x__yes_____no    Tape and guaze:    _____yes __x___no    Tolerated procedure:    ___x__yes _____no

## 2023-06-13 NOTE — PROGRESS NOTES
Post procedure call completed    Bleeding: _____yes __X___no (pt denies)    Pain: _____yes ___X___no (pt with tenderness, used ice, used OTC pain meds x1)    Redness/Swelling: ______yes ___X___no (pt denies)    Band aid removed: __X___yes _____no     Steri-Strips intact: ___X___yes _____no (discussed with patient to remove steri strips on Saturday if they have not come off on their own)    Pt with no questions at this time, adv will call when results available, adv to call with any questions or concerns, has name/# for contact

## 2023-06-16 ENCOUNTER — TELEPHONE (OUTPATIENT)
Dept: MAMMOGRAPHY | Facility: CLINIC | Age: 63
End: 2023-06-16

## 2023-06-16 PROCEDURE — 88342 IMHCHEM/IMCYTCHM 1ST ANTB: CPT | Performed by: STUDENT IN AN ORGANIZED HEALTH CARE EDUCATION/TRAINING PROGRAM

## 2023-06-16 PROCEDURE — 88341 IMHCHEM/IMCYTCHM EA ADD ANTB: CPT | Performed by: STUDENT IN AN ORGANIZED HEALTH CARE EDUCATION/TRAINING PROGRAM

## 2023-06-16 PROCEDURE — 88305 TISSUE EXAM BY PATHOLOGIST: CPT | Performed by: STUDENT IN AN ORGANIZED HEALTH CARE EDUCATION/TRAINING PROGRAM

## 2023-06-26 ENCOUNTER — TELEPHONE (OUTPATIENT)
Dept: MAMMOGRAPHY | Facility: CLINIC | Age: 63
End: 2023-06-26

## 2023-07-08 DIAGNOSIS — G43.009 MIGRAINE WITHOUT AURA AND WITHOUT STATUS MIGRAINOSUS, NOT INTRACTABLE: ICD-10-CM

## 2023-07-08 RX ORDER — SUMATRIPTAN 100 MG/1
100 TABLET, FILM COATED ORAL 2 TIMES DAILY PRN
Qty: 27 TABLET | Refills: 3 | Status: SHIPPED | OUTPATIENT
Start: 2023-07-08 | End: 2024-07-02

## 2023-07-31 ENCOUNTER — APPOINTMENT (OUTPATIENT)
Dept: LAB | Facility: CLINIC | Age: 63
End: 2023-07-31

## 2023-07-31 DIAGNOSIS — Z00.8 HEALTH EXAMINATION IN POPULATION SURVEYS: ICD-10-CM

## 2023-07-31 DIAGNOSIS — Z00.8 ENCOUNTER FOR OTHER GENERAL EXAMINATION: ICD-10-CM

## 2023-07-31 LAB
CHOLEST SERPL-MCNC: 183 MG/DL
EST. AVERAGE GLUCOSE BLD GHB EST-MCNC: 117 MG/DL
HBA1C MFR BLD: 5.7 %
HDLC SERPL-MCNC: 49 MG/DL
LDLC SERPL CALC-MCNC: 86 MG/DL (ref 0–100)
NONHDLC SERPL-MCNC: 134 MG/DL
TRIGL SERPL-MCNC: 238 MG/DL

## 2023-07-31 PROCEDURE — 80061 LIPID PANEL: CPT

## 2023-07-31 PROCEDURE — 83036 HEMOGLOBIN GLYCOSYLATED A1C: CPT

## 2023-07-31 PROCEDURE — 36415 COLL VENOUS BLD VENIPUNCTURE: CPT

## 2023-08-22 ENCOUNTER — OFFICE VISIT (OUTPATIENT)
Age: 63
End: 2023-08-22
Payer: COMMERCIAL

## 2023-08-22 ENCOUNTER — TELEPHONE (OUTPATIENT)
Dept: PULMONOLOGY | Facility: CLINIC | Age: 63
End: 2023-08-22

## 2023-08-22 VITALS
DIASTOLIC BLOOD PRESSURE: 70 MMHG | WEIGHT: 167 LBS | SYSTOLIC BLOOD PRESSURE: 110 MMHG | BODY MASS INDEX: 27.82 KG/M2 | HEART RATE: 102 BPM | TEMPERATURE: 97.8 F | HEIGHT: 65 IN | OXYGEN SATURATION: 93 %

## 2023-08-22 DIAGNOSIS — F33.2 MDD (MAJOR DEPRESSIVE DISORDER), RECURRENT SEVERE, WITHOUT PSYCHOSIS (HCC): ICD-10-CM

## 2023-08-22 DIAGNOSIS — E66.3 OVERWEIGHT (BMI 25.0-29.9): ICD-10-CM

## 2023-08-22 DIAGNOSIS — G47.33 OSA (OBSTRUCTIVE SLEEP APNEA): Primary | ICD-10-CM

## 2023-08-22 PROCEDURE — 99204 OFFICE O/P NEW MOD 45 MIN: CPT | Performed by: INTERNAL MEDICINE

## 2023-08-22 RX ORDER — LIOTHYRONINE SODIUM 25 UG/1
TABLET ORAL
COMMUNITY
Start: 2023-08-09

## 2023-08-22 NOTE — TELEPHONE ENCOUNTER
Patient lvm asking if the cpap order can be sent to [address] [place] [address] in [facility]. Please advise.

## 2023-08-22 NOTE — PROGRESS NOTES
Assessment/Plan:     Diagnoses and all orders for this visit:    SRI (obstructive sleep apnea)    MDD (major depressive disorder), recurrent severe, without psychosis ([address])    Overweight (BMI 25.0-29. 9)    Other orders  -     liothyronine (CYTOMEL) 25 mcg tablet          Plan for follow up:  Recent home sleep study reviewed with mild obstructive sleep apnea with an AHI of 8.8 associated with events related hypoxemia  Etiology pathogenesis of obstructive sleep apnea discussed in detail  Consequences of untreated sleep apnea discussed with excessive daytime sleepiness, increased risk for myocardial infarction stroke atrial fibrillation discussed  Various treatment options for the mild obstructive sleep apnea discussed with mandibular advancing appliance, weight loss and PAP machine discussed not a candidate for inspire given the AHI is less than 15  Again given the history of depression and MDD currently on treatment she would qualify for the CPAP clinic she would like to go in for the auto CPAP. Need for compliance with the CPAP machine discussed understands and verbalizes  Will order and have it set up and she will start using it any concerns she will give the office a call. Caution against driving when sleepy. Recommend weight loss  Follow-up in 3 months with a CPAP download compliance. Return in about 3 months (around 11/22/2023). All questions are answered to the patient's satisfaction and understanding. She verbalizes understanding. She is encouraged to call with any further questions or concerns. Portions of the record may have been created with voice recognition software. Occasional wrong word or "sound a like" substitutions may have occurred due to the inherent limitations of voice recognition software. Read the chart carefully and recognize, using context, where substitutions have occurred. a    Electronically Signed by Orlando Segura MD    ______________________________________________________________________    Chief Complaint:   Chief Complaint   Patient presents with   • Sleep Apnea        Patient ID: Omar Guzman is a 61 y.o. y.o. female has a past medical history of Depression and Sleep apnea, obstructive. 8/22/2023  Patient presents today for initial visit. 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 Travel history: None  Review of Systems   Constitutional: Positive for fatigue. HENT: Negative. Eyes: Negative. Respiratory: Negative. History of snoring occasional witnessed apneic spells occasional choking and gasping for air. Cardiovascular: Negative. Gastrointestinal: Negative. Endocrine: Negative. Genitourinary: Negative. Musculoskeletal: Negative. Allergic/Immunologic: Negative. Neurological: Negative. Hematological: Negative. Psychiatric/Behavioral: Negative. Social history: She reports that she has never smoked. She has never used smokeless tobacco. She reports that she does not drink alcohol and does not use drugs.     Past surgical history:   Past Surgical History:   Procedure Laterality Date   • HYSTERECTOMY  2010    has ovaries   • US GUIDANCE BREAST BIOPSY LEFT EACH ADDITIONAL Left 6/12/2023   • US GUIDED BREAST BIOPSY LEFT COMPLETE Left 6/12/2023     Family history:   Family History   Problem Relation Age of Onset   • Hypertension Mother    • Multiple myeloma Father    • No Known Problems Sister    • No Known Problems Daughter    • No Known Problems Daughter    • No Known Problems Maternal Grandmother    • No Known Problems Maternal Grandfather    • No Known Problems Paternal Grandmother    • No Known Problems Paternal Grandfather        Immunization History   Administered Date(s) Administered   • INFLUENZA 10/10/2018   • Influenza Quadrivalent, 6-35 Months IM 11/23/2015   • Influenza, recombinant, quadrivalent,injectable, preservative free 11/19/2019, 11/22/2022   • Influenza, seasonal, injectable 10/01/2016     Current Outpatient Medications   Medication Sig Dispense Refill   • buPROPion (WELLBUTRIN XL) 300 mg 24 hr tablet Take 1 tablet (300 mg total) by mouth every morning 90 tablet 0   • cyclobenzaprine (FLEXERIL) 10 mg tablet Take 1 tablet (10 mg total) by mouth 3 (three) times a day as needed for muscle spasms 60 tablet 1   • eletriptan (RELPAX) 40 MG tablet Take 1 tablet (40 mg total) by mouth once as needed for migraine for up to 36 doses may repeat in 2 hours if necessary 18 tablet 1   • estradiol (Soniya) 0.05 MG/24HR Place 1 patch on the skin 2 (two) times a week 8 patch 11   • levothyroxine (Synthroid) 50 mcg tablet Take 1 tablet (50 mcg total) by mouth daily 90 tablet 3   • liothyronine (CYTOMEL) 25 mcg tablet      • nortriptyline (PAMELOR) 10 mg capsule Take 10 mg by mouth daily at bedtime     • Progesterone 100 MG CAPS Take 100 mg by mouth at bedtime 30 capsule 11   • SUMAtriptan (IMITREX) 100 mg tablet Take 1 tablet (100 mg total) by mouth 2 (two) times a day as needed for migraine 27 tablet 3   • Trintellix 20 MG tablet Take 20 mg by mouth in the morning     • clobetasol (TEMOVATE) 0.05 % external solution Apply topically 2 (two) times a day 50 mL 0   • LORazepam (ATIVAN) 0.5 mg tablet Take 1 tablet (0.5 mg total) by mouth 3 (three) times a day 90 tablet 2     No current facility-administered medications for this visit. Allergies: Pfizer covid-19 vac-donal 5-11y [covid-19 mrna vacc (moderna)] and Other    Objective:  Vitals:    08/22/23 0931   BP: 110/70   Pulse: 102   Temp: 97.8 °F (36.6 °C)   SpO2: 93%   Weight: 75.8 kg (167 lb)   Height: 5' 5" (1.651 m)   Oxygen Therapy  SpO2: 93 %  . Wt Readings from Last 3 Encounters:   08/22/23 75.8 kg (167 lb)   04/24/23 75.3 kg (166 lb)   03/14/23 76.5 kg (168 lb 9.6 oz)     Body mass index is 27.79 kg/m². Physical Exam  Vitals and nursing note reviewed. Constitutional:       Appearance: She is well-developed. HENT:      Head: Normocephalic and atraumatic. Mouth/Throat:      Comments: Crowded oropharyngeal airways  Eyes:      Conjunctiva/sclera: Conjunctivae normal.      Pupils: Pupils are equal, round, and reactive to light. Neck:      Thyroid: No thyromegaly. Vascular: No JVD. Cardiovascular:      Rate and Rhythm: Normal rate and regular rhythm. Heart sounds: Normal heart sounds. No murmur heard. No friction rub. No gallop. Pulmonary:      Effort: Pulmonary effort is normal. No respiratory distress. Breath sounds: Normal breath sounds. No wheezing or rales. Chest:      Chest wall: No tenderness. Musculoskeletal:         General: No tenderness or deformity. Normal range of motion. Cervical back: Normal range of motion and neck supple. Lymphadenopathy:      Cervical: No cervical adenopathy. Skin:     General: Skin is warm and dry. Neurological:      Mental Status: She is alert and oriented to person, place, and time. Diagnostics:  I have personally reviewed pertinent reports.     ESS: Total score: 7

## 2023-08-24 DIAGNOSIS — G47.33 OSA (OBSTRUCTIVE SLEEP APNEA): Primary | ICD-10-CM

## 2023-08-24 DIAGNOSIS — F33.2 MDD (MAJOR DEPRESSIVE DISORDER), RECURRENT SEVERE, WITHOUT PSYCHOSIS (HCC): ICD-10-CM

## 2023-08-25 LAB

## 2023-08-30 LAB

## 2023-11-21 DIAGNOSIS — N95.1 MENOPAUSAL SYMPTOMS: ICD-10-CM

## 2023-11-21 RX ORDER — ESTRADIOL 0.05 MG/D
1 PATCH, EXTENDED RELEASE TRANSDERMAL 2 TIMES WEEKLY
Qty: 24 PATCH | Refills: 2 | Status: SHIPPED | OUTPATIENT
Start: 2023-11-23

## 2023-11-21 RX ORDER — PROGESTERONE 100 MG/1
100 CAPSULE ORAL
Qty: 90 CAPSULE | Refills: 2 | Status: SHIPPED | OUTPATIENT
Start: 2023-11-21

## 2023-11-22 ENCOUNTER — OFFICE VISIT (OUTPATIENT)
Age: 63
End: 2023-11-22
Payer: COMMERCIAL

## 2023-11-22 VITALS
HEIGHT: 65 IN | HEART RATE: 95 BPM | BODY MASS INDEX: 27.99 KG/M2 | WEIGHT: 168 LBS | DIASTOLIC BLOOD PRESSURE: 80 MMHG | OXYGEN SATURATION: 96 % | TEMPERATURE: 97.9 F | SYSTOLIC BLOOD PRESSURE: 110 MMHG

## 2023-11-22 DIAGNOSIS — E66.3 OVERWEIGHT (BMI 25.0-29.9): ICD-10-CM

## 2023-11-22 DIAGNOSIS — G47.33 OSA (OBSTRUCTIVE SLEEP APNEA): Primary | ICD-10-CM

## 2023-11-22 DIAGNOSIS — F33.2 MDD (MAJOR DEPRESSIVE DISORDER), RECURRENT SEVERE, WITHOUT PSYCHOSIS (HCC): ICD-10-CM

## 2023-11-22 PROCEDURE — 99214 OFFICE O/P EST MOD 30 MIN: CPT | Performed by: INTERNAL MEDICINE

## 2023-11-22 NOTE — PROGRESS NOTES
Assessment/Plan:   Diagnoses and all orders for this visit:    SRI (obstructive sleep apnea)  -     Mandible Advancing Appliance    Overweight (BMI 25.0-29. 9)    MDD (major depressive disorder), recurrent severe, without psychosis ([address])    Mild obstructive sleep apnea on CPAP reviewed CPAP download compliance with 94% compliance acceptable residual AHI of 2.2  Cleaning of the supplies and change of CPAP supplies discussed discussed with the patient to continue with the current settings. Did order the mandibular advancing appliance prescription she will take it to her orthodontist to see if they can cast to make the device for her and once titrated to the maximum she will give us a call we will order the home sleep study with the device to see if all the apneas have resolved  She gets the mandible advancing appliance she will continue with the CPAP understands and verbalizes  Cautioned against driving when sleepy. Recommend weight loss  Follow-up in 6 months or earlier as needed    Return in about 6 months (around 5/22/2024). All questions are answered to the patient's satisfaction and understanding. She verbalizes understanding. She is encouraged to call with any further questions or concerns. Portions of the record may have been created with voice recognition software. Occasional wrong word or "sound a like" substitutions may have occurred due to the inherent limitations of voice recognition software. Read the chart carefully and recognize, using context, where substitutions have occurred. Electronically Signed by Michelle Santiago MD    ______________________________________________________________________    Chief Complaint:   Chief Complaint   Patient presents with    Follow-up    Sleep Apnea       Patient ID: General Murillo is a 61 y.o. y.o. female has a past medical history of Depression and Sleep apnea, obstructive. 11/22/2023  Patient presents today for follow-up visit.   Patient with mild obstructive sleep apnea given the history of MDD and also history of TMJ in the past, opted for CPAP she has been using it consistently and states that she does feel a little well rested when she is up in the morning but still continues to have the headaches in the morning. She does have trouble using the CPAP with the mask and she states wants to switch to a mandibular advancing appliance even though she had history of TMJ in the past she wants to discuss with the orthodontist as well      primary symptoms  Associated symptoms include headaches. Pertinent negatives include no chest pain, fever, myalgias or sore throat. Review of Systems   Constitutional: Negative. Negative for appetite change and fever. HENT: Negative. Negative for ear pain, postnasal drip, rhinorrhea, sneezing, sore throat and trouble swallowing. Eyes: Negative. Respiratory: Negative. Cardiovascular: Negative. Negative for chest pain. Gastrointestinal: Negative. Endocrine: Negative. Genitourinary: Negative. Musculoskeletal: Negative. Negative for myalgias. Allergic/Immunologic: Negative. Neurological:  Positive for headaches. Hematological: Negative. Psychiatric/Behavioral: Negative. Smoking history: She reports that she has never smoked.  She has never used smokeless tobacco.    The following portions of the patient's history were reviewed and updated as appropriate: allergies, current medications, past family history, past medical history, past social history, past surgical history, and problem list.    Immunization History   Administered Date(s) Administered    INFLUENZA 10/10/2018    Influenza Quadrivalent, 6-35 Months IM 11/23/2015    Influenza, recombinant, quadrivalent,injectable, preservative free 11/19/2019, 11/22/2022    Influenza, seasonal, injectable 10/01/2016     Current Outpatient Medications   Medication Sig Dispense Refill    buPROPion (WELLBUTRIN XL) 300 mg 24 hr tablet Take 1 tablet (300 mg total) by mouth every morning 90 tablet 0    cyclobenzaprine (FLEXERIL) 10 mg tablet Take 1 tablet (10 mg total) by mouth 3 (three) times a day as needed for muscle spasms 60 tablet 1    eletriptan (RELPAX) 40 MG tablet Take 1 tablet (40 mg total) by mouth once as needed for migraine for up to 36 doses may repeat in 2 hours if necessary 18 tablet 1    [START ON 11/23/2023] estradiol (Soniya) 0.05 MG/24HR Place 1 patch on the skin 2 (two) times a week 24 patch 2    levothyroxine (Synthroid) 50 mcg tablet Take 1 tablet (50 mcg total) by mouth daily 90 tablet 3    liothyronine (CYTOMEL) 25 mcg tablet       nortriptyline (PAMELOR) 10 mg capsule Take 10 mg by mouth daily at bedtime      Progesterone 100 MG CAPS Take 100 mg by mouth at bedtime 90 capsule 2    SUMAtriptan (IMITREX) 100 mg tablet Take 1 tablet (100 mg total) by mouth 2 (two) times a day as needed for migraine 27 tablet 3    Trintellix 20 MG tablet Take 20 mg by mouth in the morning      clobetasol (TEMOVATE) 0.05 % external solution Apply topically 2 (two) times a day 50 mL 0    LORazepam (ATIVAN) 0.5 mg tablet Take 1 tablet (0.5 mg total) by mouth 3 (three) times a day 90 tablet 2     No current facility-administered medications for this visit. Allergies: Pfizer covid-19 vac-donal 5-11y [covid-19 mrna vacc (moderna)] and Other    Objective:  Vitals:    11/22/23 1138   BP: 110/80   Pulse: 95   Temp: 97.9 °F (36.6 °C)   SpO2: 96%   Weight: 76.2 kg (168 lb)   Height: 5' 5" (1.651 m)   Oxygen Therapy  SpO2: 96 %  . Wt Readings from Last 3 Encounters:   11/22/23 76.2 kg (168 lb)   08/22/23 75.8 kg (167 lb)   04/24/23 75.3 kg (166 lb)     Body mass index is 27.96 kg/m². Physical Exam  Vitals and nursing note reviewed. Constitutional:       Appearance: She is well-developed. HENT:      Head: Normocephalic and atraumatic.       Mouth/Throat:      Comments: Crowded oropharyngeal airways  Eyes:      Conjunctiva/sclera: Conjunctivae normal.      Pupils: Pupils are equal, round, and reactive to light. Neck:      Thyroid: No thyromegaly. Vascular: No JVD. Cardiovascular:      Rate and Rhythm: Normal rate and regular rhythm. Heart sounds: Normal heart sounds. No murmur heard. No friction rub. No gallop. Pulmonary:      Effort: Pulmonary effort is normal. No respiratory distress. Breath sounds: Normal breath sounds. No wheezing or rales. Chest:      Chest wall: No tenderness. Musculoskeletal:         General: No tenderness or deformity. Normal range of motion. Cervical back: Normal range of motion and neck supple. Lymphadenopathy:      Cervical: No cervical adenopathy. Skin:     General: Skin is warm and dry. Neurological:      Mental Status: She is alert and oriented to person, place, and time.          Lab Review:   Orders Only on 08/24/2023   Component Date Value    Supplier Name 08/24/2023 [employer] - [address]     Supplier Phone Number 08/24/2023 [phone]     Order Status 08/24/2023 Delivery Successful     Delivery Request Date 08/24/2023 08/24/2023     Date Delivered  08/24/2023 08/30/2023     Supplier Name 08/24/2023 08/30/2023     Item Description 08/24/2023 CPAP Machine, Generic     Item Description 08/24/2023 PAP Mask, Nasal, Fit Upon Setup, N/A, 1 per 3 months     Item Description 08/24/2023 PAP Mask Interface Cushion, Nasal, 2 per 1 month     Item Description 08/24/2023 PAP Headgear, 1 per 6 months     Item Description 08/24/2023 PAP Humidifier, Heated     Item Description 08/24/2023 Disposable PAP Filter, 2 per 1 month     Item Description 08/24/2023 Non-Disposable PAP Filter, 1 per 6 months     Item Description 08/24/2023 PAP Machine Tubing, Heated, 1 per 3 months     Item Description 08/24/2023 PAP Monitoring Modem     Item Description 08/24/2023 Humidifier Water Chamber, 1 per 6 months     Item Description 08/24/2023 Standard Non-Heated PAP Tubing, 1 per 3 months     Item Description 08/24/2023 PAP Aidan, 1 per 6 months    Appointment on 07/31/2023   Component Date Value    Hemoglobin A1C 07/31/2023 5.7 (H)     EAG 07/31/2023 117     Cholesterol 07/31/2023 183     Triglycerides 07/31/2023 238 (H)     HDL, Direct 07/31/2023 49 (L)     LDL Calculated 07/31/2023 86     Non-HDL-Chol (CHOL-HDL) 07/31/2023 1155 [facility] Outpatient Visit on 06/12/2023   Component Date Value    Case Report 06/12/2023                      Value:Surgical Pathology Report                         Case: [id number]                                   Authorizing Provider:  Laura Mooney,  Collected:           06/12/2023 1427                                     MD                                                                           [facility] Re[facility]:            06/12/2023 [facility]                                                              Pathologist:           Carmen Headings, DO                                                          Specimens:   A) - Breast, Left, US BX LT BREAST 700 6CMFN 3 PASSES 12G MARQUEE                                   B) - Breast, Left, US BX LT BREAST 900 3CMFN 5 PASSES 12G MARQUEE                          Final Diagnosis 06/12/2023                      Value: This result contains rich text formatting which cannot be displayed here. Note 06/12/2023                      Value: This result contains rich text formatting which cannot be displayed here. Additional Information 06/12/2023                      Value: This result contains rich text formatting which cannot be displayed here. Gross Description 06/12/2023                      Value: This result contains rich text formatting which cannot be displayed here.     Clinical Information 06/12/2023                      Value:LEFT BREAST MASS PLEASE FORWARD ALL POSITIVE RESULTS TO NURSE REVA CASH [phone]    Imaging Findings:  A) FOCAL ASYMMETRY  Mammo diagnostic left w 3d & cad: There is a focal asymmetry seen in the lower inner quadrant of the left breast in the middle depth. The asymmetry correlates with the prior mammogram finding. US breast bilateral limited (diagnostic): There is a 5 mm x 2 mm x 4 mm oval, hypoechoic mass with angular margins seen in the left breast at 7 o'clock, 6 cm from the nipple. B) FOCAL ASYMMETRY  Mammo diagnostic left w 3d & cad: There is a focal asymmetry seen in the inner central region of the left breast in the middle depth. The asymmetry correlates with the prior mammogram finding. US breast bilateral limited (diagnostic): There is a 4 mm x 2 mm x 5 mm oval, hypoechoic mass with indistinct margins with shadowing seen in the left breast at 9 o'clock, 3 cm from the nipple.         Diagnostics:  Reviewed compliance data with 94% compliance no evidence of any mask leak acceptable residual AHI of 2.2    ESS: Total score: 8    Answers submitted by the patient for this visit:  Pulmonology Questionnaire (Submitted on 11/15/2023)  Chief Complaint: Primary symptoms  Since you first noticed this problem, how has it changed?: unchanged  Do you have shortness of breath that occurs with effort or exertion?: No  Do you have ear congestion?: No  Do you have heartburn?: No  Do you have fatigue?: Yes  Do you have nasal congestion?: No  Do you have shortness of breath when lying flat?: No  Do you have shortness of breath when you wake up?: No  Do you have sweats?: No  Have you experienced weight loss?: No  Which of the following makes your symptoms worse?: nothing  Which of the following makes your symptoms better?: nothing  Risk factors for lung disease: animal exposure

## 2023-11-30 DIAGNOSIS — L08.9 ABRASION OF NOSE WITH INFECTION, INITIAL ENCOUNTER: ICD-10-CM

## 2023-11-30 DIAGNOSIS — S00.31XA ABRASION OF NOSE WITH INFECTION, INITIAL ENCOUNTER: ICD-10-CM

## 2023-11-30 DIAGNOSIS — J31.0 OTHER RHINITIS: Primary | ICD-10-CM

## 2023-11-30 PROBLEM — E03.9 HYPOTHYROIDISM: Status: ACTIVE | Noted: 2022-12-19

## 2023-12-18 DIAGNOSIS — F41.8 DEPRESSION WITH ANXIETY: Primary | ICD-10-CM

## 2023-12-18 RX ORDER — LORAZEPAM 0.5 MG/1
0.5 TABLET ORAL EVERY 8 HOURS PRN
Qty: 30 TABLET | Refills: 2 | Status: SHIPPED | OUTPATIENT
Start: 2023-12-18 | End: 2023-12-21 | Stop reason: SDUPTHER

## 2023-12-21 DIAGNOSIS — F41.8 DEPRESSION WITH ANXIETY: ICD-10-CM

## 2023-12-21 RX ORDER — LORAZEPAM 0.5 MG/1
0.5 TABLET ORAL EVERY 8 HOURS PRN
Qty: 90 TABLET | Refills: 1 | Status: SHIPPED | OUTPATIENT
Start: 2023-12-21

## 2024-01-20 DIAGNOSIS — L23.9 CUTANEOUS HYPERSENSITIVITY: Primary | ICD-10-CM

## 2024-01-20 RX ORDER — TERBINAFINE HYDROCHLORIDE 250 MG/1
250 TABLET ORAL DAILY
Qty: 42 TABLET | Refills: 0 | Status: SHIPPED | OUTPATIENT
Start: 2024-01-20 | End: 2024-01-20

## 2024-01-20 RX ORDER — TERBINAFINE HYDROCHLORIDE 250 MG/1
250 TABLET ORAL DAILY
Qty: 42 TABLET | Refills: 0 | Status: SHIPPED | OUTPATIENT
Start: 2024-01-20 | End: 2024-03-02

## 2024-05-05 DIAGNOSIS — H66.93 ACUTE BILATERAL OTITIS MEDIA: Primary | ICD-10-CM

## 2024-05-05 DIAGNOSIS — J20.9 ACUTE BRONCHITIS, UNSPECIFIED ORGANISM: ICD-10-CM

## 2024-05-05 PROBLEM — R60.0 PERIORBITAL EDEMA OF BOTH EYES: Status: RESOLVED | Noted: 2021-05-27 | Resolved: 2024-05-05

## 2024-05-05 RX ORDER — BENZONATATE 200 MG/1
200 CAPSULE ORAL 3 TIMES DAILY PRN
Qty: 30 CAPSULE | Refills: 0 | Status: SHIPPED | OUTPATIENT
Start: 2024-05-05

## 2024-05-05 RX ORDER — AMOXICILLIN 875 MG/1
875 TABLET, COATED ORAL 2 TIMES DAILY
Qty: 20 TABLET | Refills: 0 | Status: SHIPPED | OUTPATIENT
Start: 2024-05-05 | End: 2024-05-15

## 2024-05-08 DIAGNOSIS — R60.0 PERIORBITAL EDEMA OF BOTH EYES: ICD-10-CM

## 2024-05-08 RX ORDER — PREDNISONE 10 MG/1
10 TABLET ORAL DAILY
Qty: 90 TABLET | Refills: 0 | Status: SHIPPED | OUTPATIENT
Start: 2024-05-08

## 2024-05-09 DIAGNOSIS — E03.2 HYPOTHYROIDISM DUE TO MEDICATION: ICD-10-CM

## 2024-05-09 RX ORDER — LEVOTHYROXINE SODIUM 0.05 MG/1
50 TABLET ORAL DAILY
Qty: 90 TABLET | Refills: 3 | Status: SHIPPED | OUTPATIENT
Start: 2024-05-09 | End: 2025-05-09

## 2024-05-17 DIAGNOSIS — H66.93 ACUTE BILATERAL OTITIS MEDIA: Primary | ICD-10-CM

## 2024-05-17 RX ORDER — AZELASTINE 1 MG/ML
1 SPRAY, METERED NASAL 2 TIMES DAILY
Qty: 30 ML | Refills: 1 | Status: SHIPPED | OUTPATIENT
Start: 2024-05-17

## 2024-05-17 RX ORDER — CEFUROXIME AXETIL 500 MG/1
500 TABLET ORAL EVERY 12 HOURS SCHEDULED
Qty: 30 TABLET | Refills: 0 | Status: SHIPPED | OUTPATIENT
Start: 2024-05-17 | End: 2024-06-01

## 2024-05-20 DIAGNOSIS — H91.93 BILATERAL HEARING LOSS, UNSPECIFIED HEARING LOSS TYPE: ICD-10-CM

## 2024-05-20 DIAGNOSIS — H66.93 ACUTE BILATERAL OTITIS MEDIA: Primary | ICD-10-CM

## 2024-05-22 ENCOUNTER — DOCUMENTATION (OUTPATIENT)
Dept: GERIATRICS | Facility: OTHER | Age: 64
End: 2024-05-22

## 2024-05-22 DIAGNOSIS — G43.009 MIGRAINE WITHOUT AURA AND WITHOUT STATUS MIGRAINOSUS, NOT INTRACTABLE: Primary | ICD-10-CM

## 2024-05-22 RX ORDER — SUMATRIPTAN 100 MG/1
100 TABLET, FILM COATED ORAL 2 TIMES DAILY PRN
Qty: 27 TABLET | Refills: 3 | Status: SHIPPED | OUTPATIENT
Start: 2024-05-22 | End: 2024-06-21

## 2024-05-31 ENCOUNTER — DOCUMENTATION (OUTPATIENT)
Age: 64
End: 2024-05-31

## 2024-05-31 DIAGNOSIS — M79.10 MYALGIA: Primary | ICD-10-CM

## 2024-05-31 DIAGNOSIS — G43.011 INTRACTABLE MIGRAINE WITHOUT AURA AND WITH STATUS MIGRAINOSUS: Primary | ICD-10-CM

## 2024-05-31 DIAGNOSIS — R05.2 SUBACUTE COUGH: ICD-10-CM

## 2024-05-31 DIAGNOSIS — R21 RASH AND NONSPECIFIC SKIN ERUPTION: ICD-10-CM

## 2024-05-31 DIAGNOSIS — R50.9 FEVER, UNSPECIFIED FEVER CAUSE: ICD-10-CM

## 2024-05-31 RX ORDER — SUMATRIPTAN 6 MG/.5ML
6 INJECTION, SOLUTION SUBCUTANEOUS ONCE AS NEEDED
Qty: 2.5 ML | Refills: 1 | Status: SHIPPED | OUTPATIENT
Start: 2024-05-31 | End: 2024-06-03

## 2024-05-31 RX ORDER — SUMATRIPTAN 6 MG/.5ML
6 INJECTION, SOLUTION SUBCUTANEOUS ONCE AS NEEDED
Qty: 2.5 ML | Refills: 1 | Status: SHIPPED | OUTPATIENT
Start: 2024-05-31 | End: 2024-05-31 | Stop reason: SDUPTHER

## 2024-05-31 RX ORDER — SUMATRIPTAN 6 MG/.5ML
6 INJECTION, SOLUTION SUBCUTANEOUS ONCE
Qty: 0.5 ML | Refills: 0 | Status: SHIPPED | OUTPATIENT
Start: 2024-05-31 | End: 2024-05-31 | Stop reason: ALTCHOICE

## 2024-05-31 RX ORDER — SUMATRIPTAN 6 MG/.5ML
6 INJECTION, SOLUTION SUBCUTANEOUS ONCE
Qty: 0.5 ML | Refills: 0 | Status: SHIPPED | OUTPATIENT
Start: 2024-05-31 | End: 2024-05-31 | Stop reason: SDUPTHER

## 2024-06-01 ENCOUNTER — APPOINTMENT (OUTPATIENT)
Dept: LAB | Facility: MEDICAL CENTER | Age: 64
End: 2024-06-01
Payer: COMMERCIAL

## 2024-06-01 DIAGNOSIS — R21 RASH AND NONSPECIFIC SKIN ERUPTION: ICD-10-CM

## 2024-06-01 DIAGNOSIS — R50.9 FEVER, UNSPECIFIED FEVER CAUSE: ICD-10-CM

## 2024-06-01 DIAGNOSIS — M79.10 MYALGIA: ICD-10-CM

## 2024-06-01 LAB
ALBUMIN SERPL BCP-MCNC: 4.1 G/DL (ref 3.5–5)
ALP SERPL-CCNC: 63 U/L (ref 34–104)
ALT SERPL W P-5'-P-CCNC: 35 U/L (ref 7–52)
ANION GAP SERPL CALCULATED.3IONS-SCNC: 9 MMOL/L (ref 4–13)
AST SERPL W P-5'-P-CCNC: 28 U/L (ref 13–39)
BASOPHILS # BLD AUTO: 0.02 THOUSANDS/ÂΜL (ref 0–0.1)
BASOPHILS NFR BLD AUTO: 1 % (ref 0–1)
BILIRUB SERPL-MCNC: 0.4 MG/DL (ref 0.2–1)
BUN SERPL-MCNC: 14 MG/DL (ref 5–25)
CALCIUM SERPL-MCNC: 9.1 MG/DL (ref 8.4–10.2)
CHLORIDE SERPL-SCNC: 98 MMOL/L (ref 96–108)
CK SERPL-CCNC: 35 U/L (ref 26–192)
CO2 SERPL-SCNC: 26 MMOL/L (ref 21–32)
CREAT SERPL-MCNC: 0.7 MG/DL (ref 0.6–1.3)
CRP SERPL QL: 80.3 MG/L
EOSINOPHIL # BLD AUTO: 0.09 THOUSAND/ÂΜL (ref 0–0.61)
EOSINOPHIL NFR BLD AUTO: 3 % (ref 0–6)
ERYTHROCYTE [DISTWIDTH] IN BLOOD BY AUTOMATED COUNT: 13.1 % (ref 11.6–15.1)
ERYTHROCYTE [SEDIMENTATION RATE] IN BLOOD: 25 MM/HOUR (ref 0–29)
GFR SERPL CREATININE-BSD FRML MDRD: 91 ML/MIN/1.73SQ M
GLUCOSE P FAST SERPL-MCNC: 77 MG/DL (ref 65–99)
HCT VFR BLD AUTO: 38.5 % (ref 34.8–46.1)
HGB BLD-MCNC: 12.7 G/DL (ref 11.5–15.4)
IMM GRANULOCYTES # BLD AUTO: 0.01 THOUSAND/UL (ref 0–0.2)
IMM GRANULOCYTES NFR BLD AUTO: 0 % (ref 0–2)
IRON SERPL-MCNC: 51 UG/DL (ref 50–212)
LYMPHOCYTES # BLD AUTO: 1.5 THOUSANDS/ÂΜL (ref 0.6–4.47)
LYMPHOCYTES NFR BLD AUTO: 47 % (ref 14–44)
MCH RBC QN AUTO: 29.4 PG (ref 26.8–34.3)
MCHC RBC AUTO-ENTMCNC: 33 G/DL (ref 31.4–37.4)
MCV RBC AUTO: 89 FL (ref 82–98)
MONOCYTES # BLD AUTO: 0.39 THOUSAND/ÂΜL (ref 0.17–1.22)
MONOCYTES NFR BLD AUTO: 12 % (ref 4–12)
NEUTROPHILS # BLD AUTO: 1.18 THOUSANDS/ÂΜL (ref 1.85–7.62)
NEUTS SEG NFR BLD AUTO: 37 % (ref 43–75)
NRBC BLD AUTO-RTO: 0 /100 WBCS
PHOSPHATE SERPL-MCNC: 1.9 MG/DL (ref 2.3–4.1)
PLATELET # BLD AUTO: 158 THOUSANDS/UL (ref 149–390)
PMV BLD AUTO: 9.9 FL (ref 8.9–12.7)
POTASSIUM SERPL-SCNC: 4.2 MMOL/L (ref 3.5–5.3)
PROT SERPL-MCNC: 6.9 G/DL (ref 6.4–8.4)
RBC # BLD AUTO: 4.32 MILLION/UL (ref 3.81–5.12)
SODIUM SERPL-SCNC: 133 MMOL/L (ref 135–147)
T3 SERPL-MCNC: 2 NG/ML
T4 FREE SERPL-MCNC: 0.54 NG/DL (ref 0.61–1.12)
TSH SERPL DL<=0.05 MIU/L-ACNC: 0.21 UIU/ML (ref 0.45–4.5)
WBC # BLD AUTO: 3.19 THOUSAND/UL (ref 4.31–10.16)

## 2024-06-01 PROCEDURE — 86308 HETEROPHILE ANTIBODY SCREEN: CPT

## 2024-06-01 PROCEDURE — 82550 ASSAY OF CK (CPK): CPT

## 2024-06-01 PROCEDURE — 80053 COMPREHEN METABOLIC PANEL: CPT | Performed by: INTERNAL MEDICINE

## 2024-06-01 PROCEDURE — 84443 ASSAY THYROID STIM HORMONE: CPT

## 2024-06-01 PROCEDURE — 84439 ASSAY OF FREE THYROXINE: CPT

## 2024-06-01 PROCEDURE — 85025 COMPLETE CBC W/AUTO DIFF WBC: CPT | Performed by: INTERNAL MEDICINE

## 2024-06-01 PROCEDURE — 83540 ASSAY OF IRON: CPT

## 2024-06-01 PROCEDURE — 36415 COLL VENOUS BLD VENIPUNCTURE: CPT

## 2024-06-01 PROCEDURE — 84480 ASSAY TRIIODOTHYRONINE (T3): CPT

## 2024-06-01 PROCEDURE — 86140 C-REACTIVE PROTEIN: CPT | Performed by: INTERNAL MEDICINE

## 2024-06-01 PROCEDURE — 85652 RBC SED RATE AUTOMATED: CPT | Performed by: INTERNAL MEDICINE

## 2024-06-01 PROCEDURE — 84100 ASSAY OF PHOSPHORUS: CPT

## 2024-06-02 ENCOUNTER — APPOINTMENT (OUTPATIENT)
Dept: CT IMAGING | Facility: HOSPITAL | Age: 64
End: 2024-06-02
Payer: COMMERCIAL

## 2024-06-02 ENCOUNTER — HOSPITAL ENCOUNTER (OUTPATIENT)
Facility: HOSPITAL | Age: 64
Setting detail: OBSERVATION
Discharge: HOME | End: 2024-06-03
Attending: EMERGENCY MEDICINE | Admitting: INTERNAL MEDICINE
Payer: COMMERCIAL

## 2024-06-02 ENCOUNTER — APPOINTMENT (EMERGENCY)
Dept: RADIOLOGY | Facility: HOSPITAL | Age: 64
End: 2024-06-02
Payer: COMMERCIAL

## 2024-06-02 ENCOUNTER — APPOINTMENT (EMERGENCY)
Dept: CT IMAGING | Facility: HOSPITAL | Age: 64
End: 2024-06-02
Payer: COMMERCIAL

## 2024-06-02 DIAGNOSIS — D72.89 LEFT SHIFT WITHOUT DIAGNOSIS OF SPECIFIC INFECTION: ICD-10-CM

## 2024-06-02 DIAGNOSIS — J90 PLEURAL EFFUSION: ICD-10-CM

## 2024-06-02 DIAGNOSIS — H70.11 CHRONIC INFLAMMATION OF RIGHT MASTOID: ICD-10-CM

## 2024-06-02 DIAGNOSIS — E87.1 HYPONATREMIA: ICD-10-CM

## 2024-06-02 DIAGNOSIS — R50.9 LOW GRADE FEVER: ICD-10-CM

## 2024-06-02 DIAGNOSIS — R79.89 ELEVATED LFTS: ICD-10-CM

## 2024-06-02 DIAGNOSIS — R51.9 HEADACHE: Primary | ICD-10-CM

## 2024-06-02 PROBLEM — R94.6 ABNORMAL THYROID FUNCTION TEST: Status: ACTIVE | Noted: 2024-06-02

## 2024-06-02 PROBLEM — R52 GENERALIZED BODY ACHES: Status: ACTIVE | Noted: 2024-06-02

## 2024-06-02 LAB
ALBUMIN SERPL BCP-MCNC: 4 G/DL (ref 3.5–5)
ALP SERPL-CCNC: 56 U/L (ref 34–104)
ALT SERPL W P-5'-P-CCNC: 58 U/L (ref 7–52)
ANION GAP SERPL CALCULATED.3IONS-SCNC: 8 MMOL/L (ref 4–13)
ANISOCYTOSIS BLD QL SMEAR: PRESENT
AST SERPL W P-5'-P-CCNC: 44 U/L (ref 13–39)
BACTERIA UR QL AUTO: NORMAL /HPF
BASOPHILS # BLD MANUAL: 0 THOUSAND/UL (ref 0–0.1)
BASOPHILS NFR MAR MANUAL: 0 % (ref 0–1)
BILIRUB SERPL-MCNC: 0.46 MG/DL (ref 0.2–1)
BILIRUB UR QL STRIP: NEGATIVE
BUN SERPL-MCNC: 18 MG/DL (ref 5–25)
CALCIUM SERPL-MCNC: 8.9 MG/DL (ref 8.4–10.2)
CARDIAC TROPONIN I PNL SERPL HS: <2 NG/L
CARDIAC TROPONIN I PNL SERPL HS: <2 NG/L
CHLORIDE SERPL-SCNC: 98 MMOL/L (ref 96–108)
CK SERPL-CCNC: 32 U/L (ref 26–192)
CLARITY UR: CLEAR
CO2 SERPL-SCNC: 24 MMOL/L (ref 21–32)
COLOR UR: ABNORMAL
CREAT SERPL-MCNC: 0.81 MG/DL (ref 0.6–1.3)
CRP SERPL QL: 82.1 MG/L
EOSINOPHIL # BLD MANUAL: 0.14 THOUSAND/UL (ref 0–0.4)
EOSINOPHIL NFR BLD MANUAL: 2 % (ref 0–6)
ERYTHROCYTE [DISTWIDTH] IN BLOOD BY AUTOMATED COUNT: 13.3 % (ref 11.6–15.1)
ERYTHROCYTE [SEDIMENTATION RATE] IN BLOOD: 11 MM/HOUR (ref 0–29)
FLUAV RNA RESP QL NAA+PROBE: NEGATIVE
FLUBV RNA RESP QL NAA+PROBE: NEGATIVE
GFR SERPL CREATININE-BSD FRML MDRD: 76 ML/MIN/1.73SQ M
GLUCOSE SERPL-MCNC: 135 MG/DL (ref 65–140)
GLUCOSE UR STRIP-MCNC: NEGATIVE MG/DL
HCT VFR BLD AUTO: 36.8 % (ref 34.8–46.1)
HETEROPH AB SER QL: NEGATIVE
HGB BLD-MCNC: 12.3 G/DL (ref 11.5–15.4)
HGB UR QL STRIP.AUTO: NEGATIVE
HOLD SPECIMEN: NORMAL
KETONES UR STRIP-MCNC: ABNORMAL MG/DL
LEUKOCYTE ESTERASE UR QL STRIP: NEGATIVE
LYMPHOCYTES # BLD AUTO: 0.34 THOUSAND/UL (ref 0.6–4.47)
LYMPHOCYTES # BLD AUTO: 4 % (ref 14–44)
MAGNESIUM SERPL-MCNC: 2 MG/DL (ref 1.9–2.7)
MCH RBC QN AUTO: 29.4 PG (ref 26.8–34.3)
MCHC RBC AUTO-ENTMCNC: 33.4 G/DL (ref 31.4–37.4)
MCV RBC AUTO: 88 FL (ref 82–98)
MONOCYTES # BLD AUTO: 0.14 THOUSAND/UL (ref 0–1.22)
MONOCYTES NFR BLD: 2 % (ref 4–12)
NEUTROPHILS # BLD MANUAL: 6.17 THOUSAND/UL (ref 1.85–7.62)
NEUTS BAND NFR BLD MANUAL: 7 % (ref 0–8)
NEUTS SEG NFR BLD AUTO: 84 % (ref 43–75)
NITRITE UR QL STRIP: NEGATIVE
NON-SQ EPI CELLS URNS QL MICRO: NORMAL /HPF
PH UR STRIP.AUTO: 6.5 [PH]
PHOSPHATE SERPL-MCNC: 3.2 MG/DL (ref 2.3–4.1)
PLATELET # BLD AUTO: 166 THOUSANDS/UL (ref 149–390)
PLATELET BLD QL SMEAR: ADEQUATE
PMV BLD AUTO: 9.3 FL (ref 8.9–12.7)
POTASSIUM SERPL-SCNC: 4.3 MMOL/L (ref 3.5–5.3)
PROCALCITONIN SERPL-MCNC: 1.09 NG/ML
PROT SERPL-MCNC: 6.7 G/DL (ref 6.4–8.4)
PROT UR STRIP-MCNC: ABNORMAL MG/DL
RBC # BLD AUTO: 4.19 MILLION/UL (ref 3.81–5.12)
RBC #/AREA URNS AUTO: NORMAL /HPF
RBC MORPH BLD: PRESENT
RSV RNA RESP QL NAA+PROBE: NEGATIVE
SARS-COV-2 RNA RESP QL NAA+PROBE: NEGATIVE
SODIUM SERPL-SCNC: 130 MMOL/L (ref 135–147)
SP GR UR STRIP.AUTO: >=1.05 (ref 1–1.03)
UROBILINOGEN UR STRIP-ACNC: <2 MG/DL
VARIANT LYMPHS # BLD AUTO: 1 %
WBC # BLD AUTO: 6.78 THOUSAND/UL (ref 4.31–10.16)
WBC #/AREA URNS AUTO: NORMAL /HPF

## 2024-06-02 PROCEDURE — 85027 COMPLETE CBC AUTOMATED: CPT | Performed by: EMERGENCY MEDICINE

## 2024-06-02 PROCEDURE — 71046 X-RAY EXAM CHEST 2 VIEWS: CPT

## 2024-06-02 PROCEDURE — 84484 ASSAY OF TROPONIN QUANT: CPT | Performed by: EMERGENCY MEDICINE

## 2024-06-02 PROCEDURE — 86140 C-REACTIVE PROTEIN: CPT | Performed by: EMERGENCY MEDICINE

## 2024-06-02 PROCEDURE — 99285 EMERGENCY DEPT VISIT HI MDM: CPT | Performed by: EMERGENCY MEDICINE

## 2024-06-02 PROCEDURE — 85007 BL SMEAR W/DIFF WBC COUNT: CPT | Performed by: EMERGENCY MEDICINE

## 2024-06-02 PROCEDURE — 84145 PROCALCITONIN (PCT): CPT | Performed by: EMERGENCY MEDICINE

## 2024-06-02 PROCEDURE — 80053 COMPREHEN METABOLIC PANEL: CPT | Performed by: EMERGENCY MEDICINE

## 2024-06-02 PROCEDURE — 86789 WEST NILE VIRUS ANTIBODY: CPT | Performed by: EMERGENCY MEDICINE

## 2024-06-02 PROCEDURE — 70487 CT MAXILLOFACIAL W/DYE: CPT

## 2024-06-02 PROCEDURE — 86618 LYME DISEASE ANTIBODY: CPT | Performed by: EMERGENCY MEDICINE

## 2024-06-02 PROCEDURE — 93005 ELECTROCARDIOGRAM TRACING: CPT

## 2024-06-02 PROCEDURE — 99223 1ST HOSP IP/OBS HIGH 75: CPT | Performed by: FAMILY MEDICINE

## 2024-06-02 PROCEDURE — 96366 THER/PROPH/DIAG IV INF ADDON: CPT

## 2024-06-02 PROCEDURE — 82550 ASSAY OF CK (CPK): CPT | Performed by: EMERGENCY MEDICINE

## 2024-06-02 PROCEDURE — 36415 COLL VENOUS BLD VENIPUNCTURE: CPT

## 2024-06-02 PROCEDURE — 96365 THER/PROPH/DIAG IV INF INIT: CPT

## 2024-06-02 PROCEDURE — 83735 ASSAY OF MAGNESIUM: CPT | Performed by: EMERGENCY MEDICINE

## 2024-06-02 PROCEDURE — 86788 WEST NILE VIRUS AB IGM: CPT | Performed by: EMERGENCY MEDICINE

## 2024-06-02 PROCEDURE — 81001 URINALYSIS AUTO W/SCOPE: CPT | Performed by: EMERGENCY MEDICINE

## 2024-06-02 PROCEDURE — 96375 TX/PRO/DX INJ NEW DRUG ADDON: CPT

## 2024-06-02 PROCEDURE — 99284 EMERGENCY DEPT VISIT MOD MDM: CPT

## 2024-06-02 PROCEDURE — 84100 ASSAY OF PHOSPHORUS: CPT | Performed by: EMERGENCY MEDICINE

## 2024-06-02 PROCEDURE — 0241U HB NFCT DS VIR RESP RNA 4 TRGT: CPT | Performed by: EMERGENCY MEDICINE

## 2024-06-02 PROCEDURE — 70450 CT HEAD/BRAIN W/O DYE: CPT

## 2024-06-02 PROCEDURE — 85652 RBC SED RATE AUTOMATED: CPT | Performed by: EMERGENCY MEDICINE

## 2024-06-02 RX ORDER — CYCLOBENZAPRINE HCL 10 MG
10 TABLET ORAL 3 TIMES DAILY PRN
Status: DISCONTINUED | OUTPATIENT
Start: 2024-06-02 | End: 2024-06-03 | Stop reason: HOSPADM

## 2024-06-02 RX ORDER — DOXYCYCLINE HYCLATE 100 MG/1
100 CAPSULE ORAL EVERY 12 HOURS
Status: DISCONTINUED | OUTPATIENT
Start: 2024-06-02 | End: 2024-06-03

## 2024-06-02 RX ORDER — NORTRIPTYLINE HYDROCHLORIDE 10 MG/1
10 CAPSULE ORAL
Status: DISCONTINUED | OUTPATIENT
Start: 2024-06-02 | End: 2024-06-03 | Stop reason: HOSPADM

## 2024-06-02 RX ORDER — KETOROLAC TROMETHAMINE 30 MG/ML
30 INJECTION, SOLUTION INTRAMUSCULAR; INTRAVENOUS EVERY 8 HOURS SCHEDULED
Status: DISCONTINUED | OUTPATIENT
Start: 2024-06-02 | End: 2024-06-03 | Stop reason: HOSPADM

## 2024-06-02 RX ORDER — METOCLOPRAMIDE HYDROCHLORIDE 5 MG/ML
10 INJECTION INTRAMUSCULAR; INTRAVENOUS EVERY 8 HOURS SCHEDULED
Status: DISCONTINUED | OUTPATIENT
Start: 2024-06-02 | End: 2024-06-03 | Stop reason: HOSPADM

## 2024-06-02 RX ORDER — BUPROPION HYDROCHLORIDE 150 MG/1
300 TABLET ORAL EVERY MORNING
Status: DISCONTINUED | OUTPATIENT
Start: 2024-06-03 | End: 2024-06-03 | Stop reason: HOSPADM

## 2024-06-02 RX ORDER — MAGNESIUM SULFATE HEPTAHYDRATE 40 MG/ML
2 INJECTION, SOLUTION INTRAVENOUS EVERY 24 HOURS
Status: DISCONTINUED | OUTPATIENT
Start: 2024-06-02 | End: 2024-06-03 | Stop reason: HOSPADM

## 2024-06-02 RX ORDER — METOCLOPRAMIDE HYDROCHLORIDE 5 MG/ML
10 INJECTION INTRAMUSCULAR; INTRAVENOUS ONCE
Status: COMPLETED | OUTPATIENT
Start: 2024-06-02 | End: 2024-06-02

## 2024-06-02 RX ORDER — DEXAMETHASONE SODIUM PHOSPHATE 10 MG/ML
10 INJECTION, SOLUTION INTRAMUSCULAR; INTRAVENOUS ONCE
Status: COMPLETED | OUTPATIENT
Start: 2024-06-02 | End: 2024-06-02

## 2024-06-02 RX ORDER — SODIUM CHLORIDE 9 MG/ML
100 INJECTION, SOLUTION INTRAVENOUS ONCE
Status: COMPLETED | OUTPATIENT
Start: 2024-06-02 | End: 2024-06-02

## 2024-06-02 RX ORDER — KETOROLAC TROMETHAMINE 30 MG/ML
15 INJECTION, SOLUTION INTRAMUSCULAR; INTRAVENOUS ONCE
Status: COMPLETED | OUTPATIENT
Start: 2024-06-02 | End: 2024-06-02

## 2024-06-02 RX ORDER — ACETAMINOPHEN 10 MG/ML
1000 INJECTION, SOLUTION INTRAVENOUS ONCE
Status: COMPLETED | OUTPATIENT
Start: 2024-06-02 | End: 2024-06-02

## 2024-06-02 RX ORDER — FLUTICASONE PROPIONATE 50 MCG
2 SPRAY, SUSPENSION (ML) NASAL DAILY
Status: DISCONTINUED | OUTPATIENT
Start: 2024-06-03 | End: 2024-06-03 | Stop reason: HOSPADM

## 2024-06-02 RX ORDER — DIPHENHYDRAMINE HYDROCHLORIDE 50 MG/ML
25 INJECTION INTRAMUSCULAR; INTRAVENOUS EVERY 8 HOURS SCHEDULED
Status: DISCONTINUED | OUTPATIENT
Start: 2024-06-02 | End: 2024-06-03 | Stop reason: HOSPADM

## 2024-06-02 RX ORDER — MAGNESIUM SULFATE HEPTAHYDRATE 40 MG/ML
2 INJECTION, SOLUTION INTRAVENOUS ONCE
Status: COMPLETED | OUTPATIENT
Start: 2024-06-02 | End: 2024-06-02

## 2024-06-02 RX ORDER — ENOXAPARIN SODIUM 100 MG/ML
40 INJECTION SUBCUTANEOUS DAILY
Status: DISCONTINUED | OUTPATIENT
Start: 2024-06-03 | End: 2024-06-03 | Stop reason: HOSPADM

## 2024-06-02 RX ORDER — DIPHENHYDRAMINE HYDROCHLORIDE 50 MG/ML
25 INJECTION INTRAMUSCULAR; INTRAVENOUS ONCE
Status: COMPLETED | OUTPATIENT
Start: 2024-06-02 | End: 2024-06-02

## 2024-06-02 RX ADMIN — DIPHENHYDRAMINE HYDROCHLORIDE 25 MG: 50 INJECTION INTRAMUSCULAR; INTRAVENOUS at 22:32

## 2024-06-02 RX ADMIN — METOCLOPRAMIDE 10 MG: 5 INJECTION, SOLUTION INTRAMUSCULAR; INTRAVENOUS at 22:32

## 2024-06-02 RX ADMIN — DOXYCYCLINE 100 MG: 100 CAPSULE ORAL at 19:24

## 2024-06-02 RX ADMIN — DEXAMETHASONE SODIUM PHOSPHATE 10 MG: 10 INJECTION INTRAMUSCULAR; INTRAVENOUS at 14:13

## 2024-06-02 RX ADMIN — ACETAMINOPHEN 1000 MG: 10 INJECTION INTRAVENOUS at 16:30

## 2024-06-02 RX ADMIN — KETOROLAC TROMETHAMINE 30 MG: 30 INJECTION, SOLUTION INTRAMUSCULAR; INTRAVENOUS at 22:33

## 2024-06-02 RX ADMIN — CEFTRIAXONE 2000 MG: 2 INJECTION, POWDER, FOR SOLUTION INTRAMUSCULAR; INTRAVENOUS at 19:24

## 2024-06-02 RX ADMIN — AZITHROMYCIN MONOHYDRATE 500 MG: 500 INJECTION, POWDER, LYOPHILIZED, FOR SOLUTION INTRAVENOUS at 17:14

## 2024-06-02 RX ADMIN — NORTRIPTYLINE HYDROCHLORIDE 10 MG: 10 CAPSULE ORAL at 22:36

## 2024-06-02 RX ADMIN — METOCLOPRAMIDE 10 MG: 5 INJECTION, SOLUTION INTRAMUSCULAR; INTRAVENOUS at 14:12

## 2024-06-02 RX ADMIN — RIMEGEPANT SULFATE 75 MG: 75 TABLET, ORALLY DISINTEGRATING ORAL at 19:24

## 2024-06-02 RX ADMIN — SODIUM CHLORIDE, SODIUM LACTATE, POTASSIUM CHLORIDE, AND CALCIUM CHLORIDE 1000 ML: .6; .31; .03; .02 INJECTION, SOLUTION INTRAVENOUS at 17:10

## 2024-06-02 RX ADMIN — DIPHENHYDRAMINE HYDROCHLORIDE 25 MG: 50 INJECTION, SOLUTION INTRAMUSCULAR; INTRAVENOUS at 14:11

## 2024-06-02 RX ADMIN — MAGNESIUM SULFATE HEPTAHYDRATE 2 G: 40 INJECTION, SOLUTION INTRAVENOUS at 14:15

## 2024-06-02 RX ADMIN — KETOROLAC TROMETHAMINE 15 MG: 30 INJECTION, SOLUTION INTRAMUSCULAR; INTRAVENOUS at 15:27

## 2024-06-02 RX ADMIN — IOHEXOL 70 ML: 350 INJECTION, SOLUTION INTRAVENOUS at 15:11

## 2024-06-02 RX ADMIN — SODIUM CHLORIDE 1000 ML: 0.9 INJECTION, SOLUTION INTRAVENOUS at 14:09

## 2024-06-02 RX ADMIN — SODIUM CHLORIDE 100 ML/HR: 0.9 INJECTION, SOLUTION INTRAVENOUS at 19:26

## 2024-06-02 NOTE — ASSESSMENT & PLAN NOTE
History of migraines treating with Imitrex as needed and nortriptyline 10 mg at bedtime.  Intractable headache for the past 3 days.  See plan above.

## 2024-06-02 NOTE — DISCHARGE INSTRUCTIONS
You were seen in the ED for headache, infectious symptoms.  You had blood work with some abnormalities. You were diagnosed with nonspecific infection.  You have been discharged with instructions to follow-up with your primary care physician for repeat of your sodium level, liver function test, white blood cell count with differential and reevaluation of your symptoms.  Please follow up with your primary care physician within the next 1 week for continued management of your conditions.  Please come back to the ED if you develop uncontrollable pain, nausea vomiting, shortness of breath, loss of consciousness, focal weakness, numbness or tingling, seizure-like activity. Thank you very much for utilizing the ED this afternoon.

## 2024-06-02 NOTE — ED PROVIDER NOTES
History  Chief Complaint   Patient presents with    Headache     Pt presents to the ED with headache and muscle aches x 1 week. Pt reports b/l ear infection for the last week. Vomiting. Currently on abx regimen. Unsure of last dose of advil this morning.      This is a 64-year-old female with a relevant past medical history of depression, anxiety, presenting to the ED today for complaint of headache.  Patient has had bilateral otitis media since coming back from a trip to [place] approximately 1 month ago, and states that she has had a headache since then.  Patient has had progressively worsening symptoms over the past 1 month.  She has had some head pressure, without any blurry vision, and has chronic tinnitus in her right ear, which is unchanged.  She has been on 2 courses of antibiotics, including Ceftin, and has taken a course of prednisone and despite this her headache continues to worsen.  She was in [place] approximately 1 month ago, and they just discovered that they were in a house with a black mold.  Associated with her ear pain, ear infections she has had bilateral hearing loss for which she has seen ENT previously.  She has had a nonproductive cough.  Otherwise she has not had any other significantly associated symptoms aside from generalized weakness, myalgias and arthralgias.  She does not go outside, has not been bitten by any ticks recently, and denies any other significantly associated symptoms aside from some nausea, vomiting of nonbilious nonbloody emesis.        Prior to Admission Medications   Prescriptions Last Dose Informant Patient Reported? Taking?   LORazepam (Ativan) 0.5 mg tablet   No No   Sig: Take 1 tablet (0.5 mg total) by mouth every 8 (eight) hours as needed for anxiety   Progesterone 100 MG CAPS   No No   Sig: Take 100 mg by mouth at bedtime   SUMAtriptan (IMITREX) 100 mg tablet   No No   Sig: Take 1 tablet (100 mg total) by mouth 2 (two) times a day as needed for migraine    SUMAtriptan (IMITREX) 6 mg/0.5 mL   No No   Sig: Inject 0.5 mL (6 mg total) under the skin once as needed for migraine   Trintellix 20 MG tablet  Self Yes No   Sig: Take 20 mg by mouth in the morning   azelastine (ASTELIN) 0.1 % nasal spray   No No   Si spray into each nostril 2 (two) times a day Use in each nostril as directed   benzonatate (TESSALON) 200 MG capsule   No No   Sig: Take 1 capsule (200 mg total) by mouth 3 (three) times a day as needed for cough   buPROPion (WELLBUTRIN XL) 300 mg 24 hr tablet  Self No No   Sig: Take 1 tablet (300 mg total) by mouth every morning   cefuroxime (CEFTIN) 500 mg tablet   No No   Sig: Take 1 tablet (500 mg total) by mouth every 12 (twelve) hours for 15 days   cyclobenzaprine (FLEXERIL) 10 mg tablet  Self No No   Sig: Take 1 tablet (10 mg total) by mouth 3 (three) times a day as needed for muscle spasms   eletriptan (RELPAX) 40 MG tablet  Self No No   Sig: Take 1 tablet (40 mg total) by mouth once as needed for migraine for up to 36 doses may repeat in 2 hours if necessary   estradiol (Soniya) 0.05 MG/24HR   No No   Sig: Place 1 patch on the skin 2 (two) times a week   fluticasone (FLONASE) 50 mcg/act nasal spray   No No   Si sprays into each nostril daily   levothyroxine (Synthroid) 50 mcg tablet   No No   Sig: Take 1 tablet (50 mcg total) by mouth daily   liothyronine (CYTOMEL) 25 mcg tablet   Yes No   mupirocin (BACTROBAN) 2 % ointment   No No   Sig: Apply topically 3 (three) times a day   Patient not taking: Reported on 2024   nortriptyline (PAMELOR) 10 mg capsule  Self Yes No   Sig: Take 10 mg by mouth daily at bedtime   predniSONE 10 mg tablet   No No   Sig: Take 1 tablet (10 mg total) by mouth daily   Patient not taking: Reported on 2024      Facility-Administered Medications: None       Past Medical History:   Diagnosis Date    Depression     Non-ulcer dyspepsia 2015    Periorbital edema of both eyes 2021    Sleep apnea, obstructive         Past Surgical History:   Procedure Laterality Date    HYSTERECTOMY  2010    has ovaries    US GUIDANCE BREAST BIOPSY LEFT EACH ADDITIONAL Left 6/12/2023    US GUIDED BREAST BIOPSY LEFT COMPLETE Left 6/12/2023       Family History   Problem Relation Age of Onset    Hypertension Mother     Multiple myeloma Father     No Known Problems Sister     No Known Problems Daughter     No Known Problems Daughter     No Known Problems Maternal Grandmother     No Known Problems Maternal Grandfather     No Known Problems Paternal Grandmother     No Known Problems Paternal Grandfather      I have reviewed and agree with the history as documented.    E-Cigarette/Vaping    E-Cigarette Use Never User      E-Cigarette/Vaping Substances    Nicotine No     THC No     CBD No     Flavoring No     Other No     Unknown No      Social History     Tobacco Use    Smoking status: Never    Smokeless tobacco: Never   Vaping Use    Vaping status: Never Used   Substance Use Topics    Alcohol use: Never    Drug use: Never       Review of Systems   Constitutional:  Negative for activity change, chills and fever.   HENT:  Positive for ear pain (Bilateral). Negative for congestion and rhinorrhea.    Eyes:  Positive for photophobia. Negative for visual disturbance.   Respiratory:  Positive for cough. Negative for chest tightness and shortness of breath.    Cardiovascular:  Negative for chest pain and leg swelling.   Gastrointestinal:  Positive for nausea and vomiting. Negative for abdominal distention.   Genitourinary:  Negative for dysuria and frequency.   Musculoskeletal:  Negative for back pain and neck stiffness.   Skin:  Negative for rash and wound.   Neurological:  Positive for weakness (Generalized) and headaches. Negative for dizziness.   Psychiatric/Behavioral:  Negative for agitation and suicidal ideas.        Physical Exam  Physical Exam  Vitals and nursing note reviewed.   Constitutional:       General: She is not in acute distress.      Appearance: Normal appearance. She is normal weight.   HENT:      Head: Normocephalic and atraumatic.      Right Ear: Tympanic membrane, ear canal and external ear normal. There is no impacted cerumen.      Left Ear: Tympanic membrane, ear canal and external ear normal. There is no impacted cerumen.      Ears:      Comments: Middle ear effusion noted bilaterally, without any significant erythema surrounding to suggest acute infection.     Nose: Nose normal. No congestion or rhinorrhea.      Mouth/Throat:      Mouth: Mucous membranes are moist.      Pharynx: Oropharynx is clear. No oropharyngeal exudate or posterior oropharyngeal erythema.   Eyes:      General: No scleral icterus.        Right eye: No discharge.         Left eye: No discharge.      Extraocular Movements: Extraocular movements intact.      Conjunctiva/sclera: Conjunctivae normal.      Pupils: Pupils are equal, round, and reactive to light.   Neck:      Comments: Negative Kernig and Brudzinski sign.  Cardiovascular:      Rate and Rhythm: Normal rate and regular rhythm.      Pulses: Normal pulses.      Heart sounds: Normal heart sounds. No murmur heard.     No gallop.   Pulmonary:      Effort: Pulmonary effort is normal. No respiratory distress.      Breath sounds: Normal breath sounds. No stridor. No wheezing or rhonchi.   Abdominal:      General: Abdomen is flat. Bowel sounds are normal. There is no distension.      Palpations: Abdomen is soft. There is no mass.      Tenderness: There is no abdominal tenderness. There is no right CVA tenderness, left CVA tenderness or guarding.      Hernia: No hernia is present.   Musculoskeletal:         General: No swelling or tenderness. Normal range of motion.      Cervical back: Normal range of motion and neck supple. No rigidity or tenderness.      Right lower leg: No edema.      Left lower leg: No edema.   Skin:     General: Skin is warm and dry.      Capillary Refill: Capillary refill takes less than 2  seconds.      Coloration: Skin is pale (Slight perioral paleness). Skin is not jaundiced.      Findings: No bruising.   Neurological:      General: No focal deficit present.      Mental Status: She is alert and oriented to person, place, and time. Mental status is at baseline.      Cranial Nerves: No cranial nerve deficit.      Sensory: No sensory deficit.      Motor: Weakness (4-5 strength in the bilateral upper and lower extremities) present.   Psychiatric:         Thought Content: Thought content normal.         Judgment: Judgment normal.         Vital Signs  ED Triage Vitals   Temperature Pulse Respirations Blood Pressure SpO2   06/02/24 1222 06/02/24 1222 06/02/24 1222 06/02/24 1222 06/02/24 1222   98 °F (36.7 °C) 77 18 144/67 98 %      Temp Source Heart Rate Source Patient Position - Orthostatic VS BP Location FiO2 (%)   06/02/24 1222 06/02/24 1222 06/02/24 1222 06/02/24 1222 --   Oral Monitor Sitting Right arm       Pain Score       06/02/24 1527       7           Vitals:    06/02/24 1222 06/02/24 1530   BP: 144/67 119/61   Pulse: 77 89   Patient Position - Orthostatic VS: Sitting Lying         Visual Acuity      ED Medications  Medications   lactated ringers bolus 1,000 mL (1,000 mL Intravenous New Bag 6/2/24 1710)   azithromycin (ZITHROMAX) 500 mg in sodium chloride 0.9% 250mL IVPB 500 mg (500 mg Intravenous New Bag 6/2/24 1714)   dexamethasone (PF) (DECADRON) injection 10 mg (10 mg Intravenous Given 6/2/24 1413)   metoclopramide (REGLAN) injection 10 mg (10 mg Intravenous Given 6/2/24 1412)   diphenhydrAMINE (BENADRYL) injection 25 mg (25 mg Intravenous Given 6/2/24 1411)   magnesium sulfate 2 g/50 mL IVPB (premix) 2 g (0 g Intravenous Stopped 6/2/24 1623)   sodium chloride 0.9 % bolus 1,000 mL (0 mL Intravenous Stopped 6/2/24 1623)   iohexol (OMNIPAQUE) 350 MG/ML injection (MULTI-DOSE) 70 mL (70 mL Intravenous Given 6/2/24 1511)   ketorolac (TORADOL) injection 15 mg (15 mg Intravenous Given 6/2/24 9432)    acetaminophen (Ofirmev) injection 1,000 mg (0 mg Intravenous Stopped 6/2/24 1645)       Diagnostic Studies  Results Reviewed       Procedure Component Value Units Date/Time    Procalcitonin [[id number]]  (Abnormal) Collected: 06/02/24 1316    Lab Status: Final result Specimen: Blood from Arm, Right Updated: 06/02/24 1652     Procalcitonin 1.09 ng/ml     FLU/RSV/COVID - if FLU/RSV clinically relevant [[id number]]  (Normal) Collected: 06/02/24 1525    Lab Status: Final result Specimen: Nares from Nose Updated: 06/02/24 1622     SARS-CoV-2 Negative     INFLUENZA A PCR Negative     INFLUENZA B PCR Negative     RSV PCR Negative    Narrative:      FOR PEDIATRIC PATIENTS - copy/paste COVID Guidelines URL to browser: [email or web address]    SARS-CoV-2 assay is a Nucleic Acid Amplification assay intended for the  qualitative detection of nucleic acid from SARS-CoV-2 in nasopharyngeal  swabs. Results are for the presumptive identification of SARS-CoV-2 RNA.    Positive results are indicative of infection with SARS-CoV-2, the virus  causing COVID-19, but do not rule out bacterial infection or co-infection  with other viruses. Laboratories within the [place] and its  territories are required to report all positive results to the appropriate  public health authorities. Negative results do not preclude SARS-CoV-2  infection and should not be used as the sole basis for treatment or other  patient management decisions. Negative results must be combined with  clinical observations, patient history, and epidemiological information.  This test has not been FDA cleared or approved.    This test has been authorized by FDA under an Emergency Use Authorization  (EUA). This test is only authorized for the duration of time the  declaration that circumstances exist justifying the authorization of the  emergency use of an in vitro diagnostic tests for detection of SARS-CoV-2  virus  and/or diagnosis of COVID-19 infection under section 564(b)(1) of  the Act, 21 U.S.C. 360bbb-3(b)(1), unless the authorization is terminated  or revoked sooner. The test has been validated but independent review by FDA  and CLIA is pending.    Test performed using [employer] Gene[employer]pert: This RT-PCR assay targets N2,  a region unique to SARS-CoV-2. A conserved region in the E-gene was chosen  for pan-Sarbecovirus detection which includes SARS-CoV-2.    According to CMS-2020-01-R, this platform meets the definition of high-throughput technology.    HS Troponin I 2hr [[id number]] Collected: 06/02/24 1525    Lab Status: Final result Specimen: Blood from Arm, Right Updated: 06/02/24 1606     hs TnI 2hr <2 ng/L      Delta 2hr hsTnI --    West Nile antibodies, IgG and IgM [[id number]] Collected: 06/02/24 1525    Lab Status: In process Specimen: Blood from Arm, Right Updated: 06/02/24 1528    HS Troponin I 4hr [[id number]]     Lab Status: No result Specimen: Blood     RBC Morphology Reflex Test [[id number]] Collected: 06/02/24 1316    Lab Status: Final result Specimen: Blood from Arm, Right Updated: 06/02/24 1501    [place] draw [[id number]] Collected: 06/02/24 1316    Lab Status: Final result Specimen: Blood from Arm, Right Updated: 06/02/24 1501    Narrative:      The following orders were created for panel order [place] draw.  Procedure                               Abnormality         Status                     ---------                               -----------         ------                     Light Blue Top on hold[[id number]]                           Final result               Gold top on hold[[id number]]                                 Final result               Green / Yellow tube on hold[[id number]]                      Final result               Green / Black tube on hold[[id number]]                       Final result               Lavender Top 3 ml on hold[[id number]]                        Final result                  Please view results for these tests on the individual orders.    C-reactive protein [[id number]]  (Abnormal) Collected: 06/02/24 1316    Lab Status: Final result Specimen: Blood from Arm, Right Updated: 06/02/24 1444     CRP 82.1 mg/L     CBC and differential [[id number]]  (Normal) Collected: 06/02/24 1316    Lab Status: Final result Specimen: Blood from Arm, Right Updated: 06/02/24 1429     WBC 6.78 Thousand/uL      RBC 4.19 Million/uL      Hemoglobin 12.3 g/dL      Hematocrit 36.8 %      MCV 88 fL      MCH 29.4 pg      MCHC 33.4 g/dL      RDW 13.3 %      MPV 9.3 fL      Platelets 166 Thousands/uL     Narrative:      This is an appended report.  These results have been appended to a previously verified report.    Manual Differential(PHLEBS Do Not Order) [[id number]]  (Abnormal) Collected: 06/02/24 1316    Lab Status: Final result Specimen: Blood from Arm, Right Updated: 06/02/24 1429     Segmented % 84 %      Bands % 7 %      Lymphocytes % 4 %      Monocytes % 2 %      Eosinophils % 2 %      Basophils % 0 %      Atypical Lymphocytes % 1 %      Absolute Neutrophils 6.17 Thousand/uL      Absolute Lymphocytes 0.34 Thousand/uL      Absolute Monocytes 0.14 Thousand/uL      Absolute Eosinophils 0.14 Thousand/uL      Absolute Basophils 0.00 Thousand/uL      Total Counted --     RBC Morphology Present     Platelet Estimate Adequate     Anisocytosis Present    Phosphorus [[id number]]  (Normal) Collected: 06/02/24 1316    Lab Status: Final result Specimen: Blood from Arm, Right Updated: 06/02/24 1424     Phosphorus 3.2 mg/dL     Narrative:      Verified by repeat    HS Troponin 0hr (reflex protocol) [[id number]]  (Normal) Collected: 06/02/24 1316    Lab Status: Final result Specimen: Blood from Arm, Right Updated: 06/02/24 1409     hs TnI 0hr <2 ng/L     CK [[id number]]  (Normal) Collected: 06/02/24 1316    Lab Status: Final result Specimen: Blood from Arm, Right Updated: 06/02/24 1402     Total CK 32 U/L     Comprehensive  metabolic panel [[id number]]  (Abnormal) Collected: 06/02/24 1316    Lab Status: Final result Specimen: Blood from Arm, Right Updated: 06/02/24 1402     Sodium 130 mmol/L      Potassium 4.3 mmol/L      Chloride 98 mmol/L      CO2 24 mmol/L      ANION GAP 8 mmol/L      BUN 18 mg/dL      Creatinine 0.81 mg/dL      Glucose 135 mg/dL      Calcium 8.9 mg/dL      AST 44 U/L      ALT 58 U/L      Alkaline Phosphatase 56 U/L      Total Protein 6.7 g/dL      Albumin 4.0 g/dL      Total Bilirubin 0.46 mg/dL      eGFR 76 ml/min/1.73sq m     Narrative:      National Kidney Disease Foundation guidelines for Chronic Kidney Disease (CKD):     Stage 1 with normal or high GFR (GFR > 90 mL/min/1.73 square meters)    Stage 2 Mild CKD (GFR = 60-89 mL/min/1.73 square meters)    Stage 3A Moderate CKD (GFR = 45-59 mL/min/1.73 square meters)    Stage 3B Moderate CKD (GFR = 30-44 mL/min/1.73 square meters)    Stage 4 Severe CKD (GFR = 15-29 mL/min/1.73 square meters)    Stage 5 End Stage CKD (GFR <15 mL/min/1.73 square meters)  Note: GFR calculation is accurate only with a steady state creatinine    Magnesium [[id number]]  (Normal) Collected: 06/02/24 1316    Lab Status: Final result Specimen: Blood from Arm, Right Updated: 06/02/24 1402     Magnesium 2.0 mg/dL     Sedimentation rate, automated [[id number]]  (Normal) Collected: 06/02/24 1316    Lab Status: Final result Specimen: Blood from Arm, Right Updated: 06/02/24 1357     Sed Rate 11 mm/hour     [place] Total AB W Reflex to IGM/IGG [[id number]] Collected: 06/02/24 1316    Lab Status: In process Specimen: Blood from Arm, Right Updated: 06/02/24 1348    Narrative:      The following orders were created for panel order [place] Total AB W Reflex to IGM/IGG.  Procedure                               Abnormality         Status                     ---------                               -----------         ------                     [place] Total AB W Reflex t...[[id number]]                      In process                    Please view results for these tests on the individual orders.    [place] Total AB W Reflex to IGM/IGG [[id number]] Collected: 06/02/24 1316    Lab Status: In process Specimen: Blood from Arm, Right Updated: 06/02/24 1348    UA w Reflex to Microscopic w Reflex to Culture [[id number]]     Lab Status: No result Specimen: Urine                    CT facial bones with contrast   Final Result by Alena Casanova MD (06/02 1620)      Evidence of chronic right otomastoiditis.      Mild dental caries.      No acute findings.         Workstation performed: [id number]         XR chest 2 views   Final Result by Ingrid White MD (06/02 1436)      Lungs clear.      Trace right effusion on the lateral projection.            Workstation performed: [id number]                    Procedures  Procedures         ED Course  ED Course as of 06/02/24 1721   Sun Jun 02, 2024   1425 Sodium(!): 130  Slightly worse     1426 ALT(!): 58   1426 AST(!): 44  More elevated than yesterday   1426 Phosphorus: 3.2  Improved from yesterday     1441 Absolute Lymphocytes(!): 0.34   1441 Segmented %(!): 84   1441 WBC: 6.78   1442 CXR: Trace right effusion on the lateral projection.   1446 C-REACTIVE PROTEIN(!): 82.1  Similar to previous                                               Medical Decision Making  This is a 64-year-old female presenting to the ED today for complaint of a headache.  Associated with this she has had bilateral ear infection for the past 1 month, for which she has had multiple courses of antibiotics.  She also has taken a course of prednisone.  She recently came back from [place], and was exposed to black mold in the house where they were over.  She denies any other significantly associated symptoms aside nausea vomiting, myalgias and arthralgias.  Her exam for the most part is unremarkable.  I do not see any focal neurologic abnormalities.  Her cranial nerves II through XII are intact.  She has middle ear effusion  bilaterally, but otherwise her ears are unremarkable.  She has negative Kernig and Brudzinski sign, and otherwise looks uncomfortable however is not displaying any focal neurologic abnormalities.  Her differential diagnosis includes: West Nile virus versus viral myositis versus less likely meningitis versus other nonspecific viral infection.  Patient has negative Kernig, negative Brudzinski, and she is sitting up in the room, thus I do not think that this is related to a meningitis type of presentation.  She has had labs recently, showing a lymphocytosis, with a leukopenia, but that was since she had the COVID-vaccine.  Her  is a physician, and has had labs done on her, and she had an elevated CRP, however no other significant abnormalities were noted.  She received sumatriptan earlier today for headache which has not improved her symptoms.  Patient received a migraine cocktail here in the ED, including dexamethasone, Toradol, IV Tylenol, however her symptoms have not improved significantly.  She has elevated LFTs when yesterday she did not have them, she also has a slightly worsened hyponatremia, and today she has a normal white blood cell count, with a left shift.  That coupled with her low-grade fever at home, and new pleural effusion on her chest x-ray I do think that there is a potential for an atypical infection.  I put in for azithromycin, IV fluids, and she underwent a CT of the facial bones with contrast.  CT of the facial bones showed chronic right otomastoiditis, however I do not think that this is an acute infection, considering her exam is so reassuring.  For that reason I do not believe that she needs any further changes into her antibiotic regiment, as I would be concerned about C. difficile.  She was initially resistant to hospitalization, however after conversation of risk versus benefit, especially with her not improving, intractable headache, need for further imaging/workup, patient agreed.   Spoke with hospitalist who accepted to take the patient onto their service without any further orders requested.    Amount and/or Complexity of Data Reviewed  Labs: ordered. Decision-making details documented in ED Course.  Radiology: ordered.    Risk  Prescription drug management.  Decision regarding hospitalization.             Disposition  Final diagnoses:   Headache   Left shift without diagnosis of specific infection   Elevated LFTs   Hyponatremia   Chronic inflammation of right mastoid   Pleural effusion   Low grade fever     Time reflects when diagnosis was documented in both MDM as applicable and the Disposition within this note       Time User Action Codes Description Comment    6/2/2024  4:33 PM Celestino, Amr Add [R51.9] Headache     6/2/2024  4:33 PM Celestino, Amr Add [D72.829] Leukocytosis     6/2/2024  4:33 PM Celestino, Amr Remove [D72.829] Leukocytosis     6/2/2024  4:33 PM Celestino, Amr Add [D72.89] Left shift without diagnosis of specific infection     6/2/2024  4:34 PM Celestino, Amr Add [R79.89] Elevated LFTs     6/2/2024  4:34 PM Celestino, Amr Add [E87.1] Hyponatremia     6/2/2024  4:34 PM Celestino, Amr Add [H70.11] Chronic inflammation of right mastoid     6/2/2024  4:39 PM Celestino, Amr Add [J90] Pleural effusion     6/2/2024  5:15 PM Celestino, Amr Add [R50.9] Low grade fever           ED Disposition       ED Disposition   Admit    Condition   Stable    Date/Time   Sun Jun 2, 2024  5:13 PM    Comment   --             Follow-up Information       Follow up With Specialties Details Why Contact Info    Susy Willis MD Internal Medicine Call in 1 day  [address]  [address]  [facility] [address] [phone]              Patient's Medications   Discharge Prescriptions    No medications on file       No discharge procedures on file.    PDMP Review         Value Time User    PDMP Reviewed  Yes 12/21/2023 10:57 AM Faby Crane PA-C            ED Provider  Electronically Signed by             Dayron Xavier  MD Celestino  06/02/24 2412

## 2024-06-02 NOTE — ASSESSMENT & PLAN NOTE
Unclear etiology in the setting of poor oral intake versus pain.  S/p 1 L of IV fluids.  Reassess CMP in the morning.  If not corrected consider osm studies

## 2024-06-02 NOTE — ASSESSMENT & PLAN NOTE
Presenting with bodyaches, chills, low-grade fever.  Suspicion for infection versus inflammatory.  Pending infectious workup as well as JESSICA.

## 2024-06-02 NOTE — ASSESSMENT & PLAN NOTE
Continue with Wellbutrin as well as nortriptyline  Patient on home on Trintellix.  Not caring medication in our pharmacy.  Patient can bring from home if she prefers to to continue.

## 2024-06-02 NOTE — ASSESSMENT & PLAN NOTE
Presents with severe uncontrollable headaches for the past 3 days.  Patient normally takes Imitrex oral as well as injectable for migraines however no improvement after home medication.  Diffuse head pain.  Reports 1 episode of vomiting and photophobia, no meningeal signs on exam.  Patient also complaining of generalized body ache, low-grade fever Tmax 99, chills.  Have treated bilateral acute otitis with amoxicillin and Ceftin as well as steroids with some improvement in infection however the decreased b/l hearing.  Patient came back from [place] a month ago and ever since she started to have ear issues.  CT facial bones showed signs of evidence of chronic right otomastoiditis with no acute findings.  CRP 80, procalcitonin 1.09, WBC normal 6k, however at baseline patient leukopenic ever since COVID-vaccine 2021 (relative leukocytosis).  Elevated LFTs.    Differentials: CNS infection vs tickborne infection vs intractable migraine vs rule out cerebral venous thrombosis given patient on hormonal replacement therapy, less likely temporal arteritis patient does not complain of any vision changes.    Plan:  Pending CT head without contrast.  Can be considered LP after imaging.  Will defer to neurology.  Started doxycycline to cover for tickborne infection and high-dose ceftriaxone 2 g every 12 for CNS infection  Neurology team consulted appreciated input  For pain ordered migraine cocktail with ketorolac on Nurtec  Pending tickborne panel, [place]  Pending JESSICA  Pending blood cultures

## 2024-06-02 NOTE — ASSESSMENT & PLAN NOTE
Currently taking at home levothyroxine 50 mcg and liothyronine 25 mcg daily.  Will hold thyroid medication given abnormal thyroid tests.  Pending new set of thyroid function test   LM on pts phone written script for Adderall XR is ready for p/u.

## 2024-06-02 NOTE — H&P
[facility]  H&P  Name: Josephine Holt 64 y.o. female I MRN: [id number]  Unit/Bed#: ED-19 I Date of Admission: 6/2/2024   Date of Service: 6/2/2024 I Hospital Day: 0      Assessment & Plan   * Intractable headache  Assessment & Plan  Presents with severe uncontrollable headaches for the past 3 days.  Patient normally takes Imitrex oral as well as injectable for migraines however no improvement after home medication.  Diffuse head pain.  Reports 1 episode of vomiting and photophobia, no meningeal signs on exam.  Patient also complaining of generalized body ache, low-grade fever Tmax 99, chills.  Have treated bilateral acute otitis with amoxicillin and Ceftin as well as steroids with some improvement in infection however the decreased b/l hearing.  Patient came back from [place] a month ago and ever since she started to have ear issues.  CT facial bones showed signs of evidence of chronic right otomastoiditis with no acute findings.  CRP 80, procalcitonin 1.09, WBC normal 6k, however at baseline patient leukopenic ever since COVID-vaccine 2021 (relative leukocytosis).  Elevated LFTs.    Differentials: CNS infection vs tickborne infection vs intractable migraine vs rule out cerebral venous thrombosis given patient on hormonal replacement therapy, less likely temporal arteritis patient does not complain of any vision changes.    Plan:  Pending CT head without contrast.  Can be considered LP after imaging.  Will defer to neurology.  Started doxycycline to cover for tickborne infection and high-dose ceftriaxone 2 g every 12 for CNS infection  Neurology team consulted appreciated input  For pain ordered migraine cocktail with ketorolac on Nurtec  Pending tickborne panel, [place]  Pending JESSICA  Pending blood cultures          Hyponatremia  Assessment & Plan  Unclear etiology in the setting of poor oral intake versus pain.  S/p 1 L of IV fluids.  Reassess CMP in the morning.  If not corrected  consider osm studies    Abnormal thyroid function test  Assessment & Plan  Abnormal thyroid function test done the patient yesterday.  TSH low, T4 low and T3 high  Will hold home thyroid medications.  Reassess thyroid function test.    Elevated LFTs  Assessment & Plan  Mildly elevated LFTs.  Denies any abdominal pain, diarrhea. Normal total bili and alk phos  Suspected in setting of acute infection.  Rule out tickborne infection  Reassess CMP in the morning     Generalized body aches  Assessment & Plan  Presenting with bodyaches, chills, low-grade fever.  Suspicion for infection versus inflammatory.  Pending infectious workup as well as JESSICA.      Hypothyroidism  Assessment & Plan  Currently taking at home levothyroxine 50 mcg and liothyronine 25 mcg daily.  Will hold thyroid medication given abnormal thyroid tests.  Pending new set of thyroid function test    Depression with anxiety  Assessment & Plan  Continue with Wellbutrin as well as nortriptyline  Patient on home on Trintellix.  Not caring medication in our pharmacy.  Patient can bring from home if she prefers to to continue.    Common migraine without aura  Assessment & Plan  History of migraines treating with Imitrex as needed and nortriptyline 10 mg at bedtime.  Intractable headache for the past 3 days.  See plan above.         VTE Pharmacologic Prophylaxis: VTE Score: 3 Moderate Risk (Score 3-4) - Pharmacological DVT Prophylaxis Ordered: enoxaparin (Lovenox).  Code Status: Level 1 - Full Code patient and   Discussion with family: Updated  () at bedside.    Anticipated Length of Stay: Patient will be admitted on an inpatient basis with an anticipated length of stay of greater than 2 midnights secondary to intractable headache and concern for infection.    Chief Complaint: Headache    History of Present Illness:  Josephine Holt is a 64 y.o. female with a PMH of depression and anxiety, hypothyroidism, migraines, SRI who presents  with intractable headache for the past 3 days.  She also experiences diffuse body ache, chills and headache not alleviated by any factors, photophobia.  She reports that she normally has migraines however such a headache she did not experience since age of 20.  She mentions that travel to [place] a month ago and after she came back she started to have your infection that was treated with amoxicillin and Ceftin as well as steroids with some improvement in the infection however developed decreased hearing.  Patient denies any neuro neurodeficits or vision problems.  Denies skin rash, joint swelling.  Denies GI or urinary issues.  Patient reports taking hormonal replacement therapy with estrogen patches and progesterone daily, no DVT in the past.  On admission patient in distress due to severe headache that did not respond to migraine cocktail.  Does not meet sepsis criteria however concern for infection so the infectious workup was done.  Patient admitted under [facility] service for headache workup as well as infection workup    Review of Systems:  Review of Systems   Constitutional:  Positive for chills. Negative for fever.   HENT:  Positive for hearing loss (Decreased hearing). Negative for ear discharge, ear pain, facial swelling and sore throat.    Eyes:  Negative for pain and visual disturbance.   Respiratory:  Negative for cough and shortness of breath.    Cardiovascular:  Negative for chest pain and palpitations.   Gastrointestinal:  Positive for vomiting. Negative for abdominal pain, diarrhea and nausea.   Genitourinary:  Negative for dysuria and hematuria.   Musculoskeletal:  Positive for myalgias and neck pain. Negative for arthralgias, back pain, joint swelling and neck stiffness.   Skin:  Negative for color change and rash.   Neurological:  Positive for headaches. Negative for seizures, syncope, facial asymmetry, speech difficulty, weakness and numbness.   All other systems reviewed and are negative.      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 Authorizing Provider   buPROPion (WELLBUTRIN XL) 300 mg 24 hr tablet Take 1 tablet (300 mg total) by mouth every morning 1/8/19  Yes Daniela Pa MD   cyclobenzaprine (FLEXERIL) 10 mg tablet Take 1 tablet (10 mg total) by mouth 3 (three) times a day as needed for muscle spasms 2/23/21  Yes Susy Willis MD   estradiol (Soniya) 0.05 MG/24HR Place 1 patch on the skin 2 (two) times a week 11/23/23  Yes Esmer Soto MD   fluticasone (FLONASE) 50 mcg/act nasal spray 2 sprays into each nostril daily 5/21/24  Yes Ilsa Napier PA-C   levothyroxine (Synthroid) 50 mcg tablet Take 1 tablet (50 mcg total) by mouth daily 5/9/24 5/9/25 Yes Hayden Holt MD   liothyronine (CYTOMEL) 25 mcg tablet  8/9/23  Yes Historical Provider, MD   nortriptyline (PAMELOR) 10 mg capsule Take 10 mg by mouth daily at bedtime 4/18/19  Yes Historical Provider, MD   Progesterone 100 MG CAPS Take 100 mg by mouth at bedtime 11/21/23  Yes Esmer Soto MD   SUMAtriptan (IMITREX) 100 mg tablet Take 1 tablet (100 mg total) by mouth 2 (two) times a day as needed for migraine 5/22/24 6/21/24 Yes Hayden Holt MD   SUMAtriptan (IMITREX) 6 mg/0.5 mL Inject 0.5 mL (6 mg total) under the skin once as needed for migraine 5/31/24 6/30/24 Yes Hayden Holt MD   Trintellix 20 MG tablet Take 20 mg by mouth in the morning 12/6/22  Yes Historical Provider, MD   cefuroxime (CEFTIN) 500  mg tablet Take 1 tablet (500 mg total) by mouth every 12 (twelve) hours for 15 days 5/17/24 6/1/24  Hayden Holt MD   LORazepam (Ativan) 0.5 mg tablet Take 1 tablet (0.5 mg total) by mouth every 8 (eight) hours as needed for anxiety 12/21/23   Faby Crane PA-C   predniSONE 10 mg tablet Take 1 tablet (10 mg total) by mouth daily  Patient not taking: Reported on 5/21/2024 5/8/24   Hayden Holt MD   azelastine (ASTELIN) 0.1 % nasal spray 1 spray into each nostril 2 (two) times a day Use in each nostril as directed  Patient not taking: Reported on 6/2/2024 5/17/24 6/2/24  Hayden Holt MD   benzonatate (TESSALON) 200 MG capsule Take 1 capsule (200 mg total) by mouth 3 (three) times a day as needed for cough  Patient not taking: Reported on 6/2/2024 5/5/24 6/2/24  Hayden Holt MD   eletriptan (RELPAX) 40 MG tablet Take 1 tablet (40 mg total) by mouth once as needed for migraine for up to 36 doses may repeat in 2 hours if necessary 7/14/22 6/2/24  Hayden Holt MD   mupirocin (BACTROBAN) 2 % ointment Apply topically 3 (three) times a day  Patient not taking: Reported on 5/21/2024 11/30/23 6/2/24  Hayden Holt MD     I have reviewed home medications with patient personally.    Allergies:   Allergies   Allergen Reactions    Pfizer Covid-19 Vac-Hugo 5-11y [Covid-19 Mrna Vacc (Moderna)] Rash     Pfizer- swollen eyes and body rash    Other        Social History:  Marital Status: /Civil Union   Occupation:   Patient Pre-hospital Living Situation: Home  Patient Pre-hospital Level of Mobility: walks  Patient Pre-hospital Diet Restrictions: none  Substance Use History:   Social History     Substance and Sexual Activity   Alcohol Use Never     Social History     Tobacco Use   Smoking Status Never   Smokeless Tobacco Never     Social History     Substance and Sexual Activity   Drug Use Never       Family History:  Family History   Problem  Relation Age of Onset    Hypertension Mother     Multiple myeloma Father     No Known Problems Sister     No Known Problems Daughter     No Known Problems Daughter     No Known Problems Maternal Grandmother     No Known Problems Maternal Grandfather     No Known Problems Paternal Grandmother     No Known Problems Paternal Grandfather        Physical Exam:     Vitals:   Blood Pressure: 119/61 (06/02/24 1530)  Pulse: 70 (06/02/24 1730)  Temperature: 98 °F (36.7 °C) (06/02/24 1222)  Temp Source: Oral (06/02/24 1222)  Respirations: 18 (06/02/24 1730)  SpO2: 97 % (06/02/24 1730)    Physical Exam  Vitals and nursing note reviewed.   Constitutional:       General: She is not in acute distress.     Appearance: She is well-developed. She is ill-appearing.   HENT:      Head: Normocephalic and atraumatic.   Eyes:      Conjunctiva/sclera: Conjunctivae normal.   Cardiovascular:      Rate and Rhythm: Normal rate and regular rhythm.      Heart sounds: Normal heart sounds. No murmur heard.  Pulmonary:      Effort: Pulmonary effort is normal. No respiratory distress.      Breath sounds: Normal breath sounds. No wheezing or rales.   Abdominal:      General: Bowel sounds are normal. There is no distension.      Palpations: Abdomen is soft.      Tenderness: There is no abdominal tenderness.   Musculoskeletal:         General: No swelling.      Cervical back: Neck supple.      Right lower leg: No edema.      Left lower leg: No edema.   Skin:     General: Skin is warm and dry.      Capillary Refill: Capillary refill takes less than 2 seconds.      Findings: No rash.   Neurological:      Mental Status: She is alert. Mental status is at baseline.   Psychiatric:         Mood and Affect: Mood normal.          Additional Data:     Lab Results:  Results from last 7 days   Lab Units 06/02/24  1316 06/01/24  1105   WBC Thousand/uL 6.78 3.19*   HEMOGLOBIN g/dL 12.3 12.7   HEMATOCRIT % 36.8 38.5   PLATELETS Thousands/uL 166 158   BANDS PCT % 7  --     SEGS PCT %  --  37*   LYMPHO PCT % 4* 47*   MONO PCT % 2* 12   EOS PCT % 2 3     Results from last 7 days   Lab Units 06/02/24  1316   SODIUM mmol/L 130*   POTASSIUM mmol/L 4.3   CHLORIDE mmol/L 98   CO2 mmol/L 24   BUN mg/dL 18   CREATININE mg/dL 0.81   ANION GAP mmol/L 8   CALCIUM mg/dL 8.9   ALBUMIN g/dL 4.0   TOTAL BILIRUBIN mg/dL 0.46   ALK PHOS U/L 56   ALT U/L 58*   AST U/L 44*   GLUCOSE RANDOM mg/dL 135                 Results from last 7 days   Lab Units 06/02/24  1316   PROCALCITONIN ng/ml 1.09*       Lines/Drains:  Invasive Devices       Peripheral Intravenous Line  Duration             Peripheral IV 06/02/24 Right Antecubital <1 day                        Imaging: Reviewed radiology reports from this admission including: CT facial bones and soft tissue  CT facial bones with contrast   Final Result by Alena Casanova MD (06/02 1620)      Evidence of chronic right otomastoiditis.      Mild dental caries.      No acute findings.         Workstation performed: [id number]         XR chest 2 views   Final Result by Ingrid White MD (06/02 1436)      Lungs clear.      Trace right effusion on the lateral projection.            Workstation performed: [id number]         CT head wo contrast    (Results Pending)       EKG and Other Studies Reviewed on Admission:   EKG: NSR. HR 83.    ** Please Note: This note has been constructed using a voice recognition system. **

## 2024-06-02 NOTE — ASSESSMENT & PLAN NOTE
Abnormal thyroid function test done the patient yesterday.  TSH low, T4 low and T3 high  Will hold home thyroid medications.  Reassess thyroid function test.

## 2024-06-02 NOTE — ASSESSMENT & PLAN NOTE
Mildly elevated LFTs.  Denies any abdominal pain, diarrhea. Normal total bili and alk phos  Suspected in setting of acute infection.  Rule out tickborne infection  Reassess CMP in the morning

## 2024-06-03 VITALS
HEART RATE: 90 BPM | SYSTOLIC BLOOD PRESSURE: 126 MMHG | TEMPERATURE: 98.8 F | RESPIRATION RATE: 16 BRPM | DIASTOLIC BLOOD PRESSURE: 74 MMHG | OXYGEN SATURATION: 99 %

## 2024-06-03 LAB
% PARASITEMIA: 0
ALBUMIN SERPL BCP-MCNC: 3.5 G/DL (ref 3.5–5)
ALP SERPL-CCNC: 49 U/L (ref 34–104)
ALT SERPL W P-5'-P-CCNC: 37 U/L (ref 7–52)
ANA SER QL IA: POSITIVE
ANION GAP SERPL CALCULATED.3IONS-SCNC: 7 MMOL/L (ref 4–13)
AST SERPL W P-5'-P-CCNC: 22 U/L (ref 13–39)
ATRIAL RATE: 92 BPM
B BURGDOR IGG+IGM SER QL IA: NEGATIVE
BILIRUB SERPL-MCNC: 0.32 MG/DL (ref 0.2–1)
BUN SERPL-MCNC: 13 MG/DL (ref 5–25)
CALCIUM SERPL-MCNC: 8 MG/DL (ref 8.4–10.2)
CHLORIDE SERPL-SCNC: 102 MMOL/L (ref 96–108)
CO2 SERPL-SCNC: 21 MMOL/L (ref 21–32)
CREAT SERPL-MCNC: 0.59 MG/DL (ref 0.6–1.3)
ERYTHROCYTE [DISTWIDTH] IN BLOOD BY AUTOMATED COUNT: 13.5 % (ref 11.6–15.1)
GFR SERPL CREATININE-BSD FRML MDRD: 97 ML/MIN/1.73SQ M
GLUCOSE P FAST SERPL-MCNC: 119 MG/DL (ref 65–99)
GLUCOSE SERPL-MCNC: 119 MG/DL (ref 65–140)
HCT VFR BLD AUTO: 31.3 % (ref 34.8–46.1)
HGB BLD-MCNC: 10.5 G/DL (ref 11.5–15.4)
MCH RBC QN AUTO: 29.4 PG (ref 26.8–34.3)
MCHC RBC AUTO-ENTMCNC: 33.5 G/DL (ref 31.4–37.4)
MCV RBC AUTO: 88 FL (ref 82–98)
OSMOLALITY UR/SERPL-RTO: 277 MMOL/KG (ref 282–298)
OSMOLALITY UR: 193 MMOL/KG
P AXIS: 51 DEGREES
PARASITE BLD: NO
PATHOLOGIST INTERPRETATION: NORMAL
PLATELET # BLD AUTO: 133 THOUSANDS/UL (ref 149–390)
PMV BLD AUTO: 9.4 FL (ref 8.9–12.7)
POTASSIUM SERPL-SCNC: 3.6 MMOL/L (ref 3.5–5.3)
PR INTERVAL: 122 MS
PROCALCITONIN SERPL-MCNC: 0.44 NG/ML
PROT SERPL-MCNC: 5.8 G/DL (ref 6.4–8.4)
QRS AXIS: 54 DEGREES
QRSD INTERVAL: 98 MS
QT INTERVAL: 358 MS
QTC INTERVAL: 442 MS
RBC # BLD AUTO: 3.57 MILLION/UL (ref 3.81–5.12)
SODIUM SERPL-SCNC: 130 MMOL/L (ref 135–147)
T WAVE AXIS: 54 DEGREES
T4 FREE SERPL-MCNC: 0.53 NG/DL (ref 0.61–1.12)
TSH SERPL DL<=0.05 MIU/L-ACNC: 0.07 UIU/ML (ref 0.45–4.5)
VENTRICULAR RATE: 92 BPM
WBC # BLD AUTO: 3.45 THOUSAND/UL (ref 4.31–10.16)

## 2024-06-03 PROCEDURE — 84443 ASSAY THYROID STIM HORMONE: CPT

## 2024-06-03 PROCEDURE — 83935 ASSAY OF URINE OSMOLALITY: CPT

## 2024-06-03 PROCEDURE — 80053 COMPREHEN METABOLIC PANEL: CPT

## 2024-06-03 PROCEDURE — 84439 ASSAY OF FREE THYROXINE: CPT

## 2024-06-03 PROCEDURE — 86753 PROTOZOA ANTIBODY NOS: CPT | Performed by: INTERNAL MEDICINE

## 2024-06-03 PROCEDURE — 86666 EHRLICHIA ANTIBODY: CPT | Performed by: INTERNAL MEDICINE

## 2024-06-03 PROCEDURE — 86235 NUCLEAR ANTIGEN ANTIBODY: CPT | Performed by: INTERNAL MEDICINE

## 2024-06-03 PROCEDURE — 86225 DNA ANTIBODY NATIVE: CPT | Performed by: INTERNAL MEDICINE

## 2024-06-03 PROCEDURE — 99244 OFF/OP CNSLTJ NEW/EST MOD 40: CPT | Performed by: PSYCHIATRY & NEUROLOGY

## 2024-06-03 PROCEDURE — 83930 ASSAY OF BLOOD OSMOLALITY: CPT

## 2024-06-03 PROCEDURE — 84145 PROCALCITONIN (PCT): CPT

## 2024-06-03 PROCEDURE — 99239 HOSP IP/OBS DSCHRG MGMT >30: CPT | Performed by: INTERNAL MEDICINE

## 2024-06-03 PROCEDURE — 87468 ANAPLSMA PHGCYTOPHLM AMP PRB: CPT | Performed by: INTERNAL MEDICINE

## 2024-06-03 PROCEDURE — 86038 ANTINUCLEAR ANTIBODIES: CPT | Performed by: INTERNAL MEDICINE

## 2024-06-03 PROCEDURE — 93010 ELECTROCARDIOGRAM REPORT: CPT | Performed by: INTERNAL MEDICINE

## 2024-06-03 PROCEDURE — 87207 SMEAR SPECIAL STAIN: CPT

## 2024-06-03 PROCEDURE — 85027 COMPLETE CBC AUTOMATED: CPT

## 2024-06-03 PROCEDURE — 86039 ANTINUCLEAR ANTIBODIES (ANA): CPT | Performed by: INTERNAL MEDICINE

## 2024-06-03 RX ORDER — DOXYCYCLINE HYCLATE 100 MG/1
100 CAPSULE ORAL EVERY 12 HOURS SCHEDULED
Qty: 20 CAPSULE | Refills: 0 | Status: SHIPPED | OUTPATIENT
Start: 2024-06-03 | End: 2024-06-13

## 2024-06-03 RX ORDER — DOXYCYCLINE HYCLATE 100 MG/1
100 CAPSULE ORAL EVERY 12 HOURS SCHEDULED
Status: DISCONTINUED | OUTPATIENT
Start: 2024-06-03 | End: 2024-06-03 | Stop reason: HOSPADM

## 2024-06-03 RX ADMIN — DOXYCYCLINE 100 MG: 100 CAPSULE ORAL at 06:50

## 2024-06-03 RX ADMIN — KETOROLAC TROMETHAMINE 30 MG: 30 INJECTION, SOLUTION INTRAMUSCULAR; INTRAVENOUS at 06:50

## 2024-06-03 RX ADMIN — DIPHENHYDRAMINE HYDROCHLORIDE 25 MG: 50 INJECTION INTRAMUSCULAR; INTRAVENOUS at 06:50

## 2024-06-03 RX ADMIN — METOCLOPRAMIDE 10 MG: 5 INJECTION, SOLUTION INTRAMUSCULAR; INTRAVENOUS at 06:49

## 2024-06-03 RX ADMIN — BUPROPION HYDROCHLORIDE 300 MG: 150 TABLET, FILM COATED, EXTENDED RELEASE ORAL at 09:22

## 2024-06-03 RX ADMIN — FLUTICASONE PROPIONATE 2 SPRAY: 50 SPRAY, METERED NASAL at 09:22

## 2024-06-03 RX ADMIN — CEFTRIAXONE 2000 MG: 2 INJECTION, POWDER, FOR SOLUTION INTRAMUSCULAR; INTRAVENOUS at 09:22

## 2024-06-03 RX ADMIN — ENOXAPARIN SODIUM 40 MG: 40 INJECTION SUBCUTANEOUS at 09:22

## 2024-06-03 NOTE — DISCHARGE SUMMARY
[facility]  Discharge- Josephine Holt 1960, 64 y.o. female MRN: [id number]  Unit/Bed#: S -[facility]itor Encounter: [id number]  Primary Care Provider: No primary care provider on file.   Date and time admitted to hospital: 6/2/2024 12:29 PM    * Intractable headache  Assessment & Plan  Presents with severe uncontrollable headaches for the past 3 days.  Patient normally takes Imitrex oral as well as injectable for migraines however no improvement after home medication.  Diffuse head pain.  Reports 1 episode of vomiting and photophobia, no meningeal signs on exam.  Patient also complaining of generalized body ache, low-grade fever Tmax 99, chills.  Have treated bilateral acute otitis with amoxicillin and Ceftin as well as steroids with some improvement in infection however the decreased b/l hearing.  Patient came back from [place] a month ago and ever since she started to have ear issues.  CT facial bones showed signs of evidence of chronic right otomastoiditis with no acute findings.  CRP 80, procalcitonin 1.09, WBC normal 6k, however at baseline patient leukopenic ever since COVID-vaccine 2021 (relative leukocytosis).  Elevated LFTs.    Differentials: CNS infection vs tickborne infection vs intractable migraine vs rule out cerebral venous thrombosis given patient on hormonal replacement therapy, less likely temporal arteritis patient does not complain of any vision changes.    Plan:  CT head unremarkable   Started doxycycline to cover for tickborne infection, continue course for 10 days  Follow with neurology as an outpatient  Pending tickborne panel, [place]  Pending JESSICA  Pending blood cultures          Hyponatremia  Assessment & Plan  Unclear etiology in the setting of poor oral intake versus pain.  Serum oxmol 277    Abnormal thyroid function test  Assessment & Plan  Abnormal thyroid function test done the patient yesterday.  TSH low, T4 low and T3 high  Will hold home thyroid  medications   Reassess thyroid function test in 3 to 4 weeks    Elevated LFTs  Assessment & Plan  Mildly elevated LFTs.  Denies any abdominal pain, diarrhea. Normal total bili and alk phos  Suspected in setting of acute infection.  Rule out tickborne infection      Generalized body aches  Assessment & Plan  Presenting with bodyaches, chills, low-grade fever.  Suspicion for infection versus inflammatory.  Pending infectious workup as well as JESSICA.      Hypothyroidism  Assessment & Plan  Currently taking at home levothyroxine 50 mcg and liothyronine 25 mcg daily.  Recommend stopping both thyroid medications due to low TSH  Repeat thyroid studies in 3 to 4 weeks with primary care doctor    Depression with anxiety  Assessment & Plan  Continue with Wellbutrin as well as nortriptyline  Patient on home on Trintellix.  Not caring medication in our pharmacy.      Common migraine without aura  Assessment & Plan  History of migraines treating with Imitrex as needed and nortriptyline 10 mg at bedtime.  Intractable headache for the past 3 days.  See plan above.        Medical Problems       Resolved Problems  Date Reviewed: 6/3/2024   None       Discharging Physician / Practitioner: Jacinda Grayson MD  PCP: No primary care provider on file.  Admission Date:   Admission Orders (From admission, onward)       Ordered        06/02/24 2955  Place in Observation  Once                          Discharge Date: 06/03/24    Consultations During Hospital Stay:  Neurology     Procedures Performed:   none    Significant Findings / Test Results:   CT head wo contrast   Final Result by Pallav N Shah, MD (06/02 1948)      No acute intracranial abnormality.      Right greater than left mastoid effusions.                  Workstation performed: [id number]         CT facial bones with contrast   Final Result by Alena Casanova MD (06/02 1620)      Evidence of chronic right otomastoiditis.      Mild dental caries.      No acute findings.          Workstation performed: [id number]         XR chest 2 views   Final Result by Ingrid White MD (06/02 1436)      Lungs clear.      Trace right effusion on the lateral projection.            Workstation performed: [id number]           Low TSH    Incidental Findings:   none       Test Results Pending at Discharge (will require follow up):   Tickborne illness and infectious workup     Outpatient Tests Requested:  TSH and thyroid studies in 3 to 4 weeks    Complications:  none    Reason for Admission: Intractable headache    Hospital Course:   Josephine Holt is a 64 y.o. female patient who originally presented to the hospital on 6/2/2024 due to intractable headache.  The headache was going on for about 3 days with associated body aches.  Patient recently had an infection of otitis media and was treated with amoxicillin and Ceftin.  Was also given oral steroids.  Given history of infection, a tickborne illness and infectious workup was ordered for the patient: Results of which are still pending at discharge and will require follow-up with PCP.  In addition, patient was given 2 doses of migraine cocktail which helped to alleviate her headache symptoms on the morning of 6/3.  Patient reported feeling much better after rest.  Neurology was also consulted for the patient in regards to the intractable headaches, a CT head was unremarkable, neurology recommended patient follow-up with them as an outpatient and no acute interventions or recommendations at this time.  Did recommend holding off on Imitrex as this can worsen patients with Raynaud's syndrome.  Will prophylactically give patient a prescription for doxycycline in order to cover any potential tickborne illness.  Will also stop all thyroid medications as TSH was found to be low while inpatient, discussed with patient that she will need to obtain thyroid study test in the next 3 to 4 weeks and to follow-up with her primary care doctor.  Patient also follow-up  with neurology as an outpatient.      Please see above list of diagnoses and related plan for additional information.     Condition at Discharge: stable    Discharge Day Visit / Exam:   Subjective: See progress note  Vitals: Blood Pressure: 126/74 (06/03/24 0809)  Pulse: 90 (06/03/24 0809)  Temperature: 98.8 °F (37.1 °C) (06/03/24 0809)  Temp Source: Oral (06/02/24 1222)  Respirations: 16 (06/02/24 2100)  SpO2: 99 % (06/03/24 0809)  Exam:   Physical Exam see progress note        Discussion with Family:  Patient declined call to family member, as she has been in contact with her .  Once has been comes to pick her up from the hospital, we will reach out to Dr. WILL.     Discharge instructions/Information to patient and family:   See after visit summary for information provided to patient and family.      Provisions for Follow-Up Care:  See after visit summary for information related to follow-up care and any pertinent home health orders.      Mobility at time of Discharge:   Basic Mobility Inpatient Raw Score: 24  JH-HLM Goal: 8: Walk 250 feet or more  JH-HLM Achieved: 8: Walk 250 feet ot more  HLM Goal achieved. Continue to encourage appropriate mobility.     Disposition:   Home    Planned Readmission: no     Discharge Statement:  I spent 45 minutes discharging the patient. This time was spent on the day of discharge. I had direct contact with the patient on the day of discharge. Greater than 50% of the total time was spent examining patient, answering all patient questions, arranging and discussing plan of care with patient as well as directly providing post-discharge instructions.  Additional time then spent on discharge activities.    Discharge Medications:  See after visit summary for reconciled discharge medications provided to patient and/or family.      **Please Note: This note may have been constructed using a voice recognition system**

## 2024-06-03 NOTE — UTILIZATION REVIEW
Initial Clinical Review    Admission: Date/Time/Statement:   Admission Orders (From admission, onward)       Ordered        06/02/24 1715  Place in Observation  Once                          Orders Placed This Encounter   Procedures    Place in Observation     Standing Status:   Standing     Number of Occurrences:   1     Order Specific Question:   Level of Care     Answer:   Med Surg [16]     ED Arrival Information       Expected   -    Arrival   6/2/2024 12:16    Acuity   Urgent              Means of arrival   Walk-In    Escorted by   Spouse    Service   Hospitalist    Admission type   Emergency              Arrival complaint   Headache/Body Aches             Chief Complaint   Patient presents with    Headache     Pt presents to the ED with headache and muscle aches x 1 week. Pt reports b/l ear infection for the last week. Vomiting. Currently on abx regimen. Unsure of last dose of advil this morning.        Initial Presentation: 64 y.o. female with a PMH of depression and anxiety, hypothyroidism, migraines, SRI who presents with intractable headache for the past 3 days. She also experiences diffuse body ache, chills and headache not alleviated by any factors, photophobia. She reports that she normally has migraines however such a headache she did not experience since age of 20. She mentions that travel to [place] a month ago and after she came back she started to have your infection that was treated with amoxicillin and Ceftin as well as steroids with some improvement in the infection however developed decreased hearing. Patient denies any neuro neurodeficits or vision problems. Plan: Observation for intractable headache, hyponatremia, abnormal thyroid function test, elevated LFTs, generalized body aches, hypothyroidism, migraine: CT head, Neurology consult, migraine cocktail, pending tickborne panel, West Nile, JESSICA, blood cultures, CMP in am, hold home thyroid medication, continue home Wellbutrin and  nortriptyline.     ED Triage Vitals   Temperature Pulse Respirations Blood Pressure SpO2   06/02/24 1222 06/02/24 1222 06/02/24 1222 06/02/24 1222 06/02/24 1222   98 °F (36.7 °C) 77 18 144/67 98 %      Temp Source Heart Rate Source Patient Position - Orthostatic VS BP Location FiO2 (%)   06/02/24 1222 06/02/24 1222 06/02/24 1222 06/02/24 1222 --   Oral Monitor Sitting Right arm       Pain Score       06/02/24 1527       7          Wt Readings from Last 1 Encounters:   05/21/24 76.2 kg (168 lb)     Additional Vital Signs:     Date/Time Temp Pulse Resp BP MAP (mmHg) SpO2 O2 Device   06/03/24 08:09:57 98.8 °F (37.1 °C) 90 -- 126/74 91 99 % --   06/03/24 00:45:58 -- 80 -- -- -- 95 % --   06/03/24 00:42:46 98.1 °F (36.7 °C) -- -- 119/66 84 -- --   06/02/24 2100 -- 66 16 134/66 91 96 % --   06/02/24 2000 -- 64 16 128/67 89 93 % --   06/02/24 1730 -- 70 18 -- -- 97 % None (Room air)   06/02/24 1530 -- 89 -- 119/61 84 96 % None (Room air)     Pertinent Labs/Diagnostic Test Results:   CT head wo contrast   Final Result by Pallav N Shah, MD (06/02 1948)      No acute intracranial abnormality.      Right greater than left mastoid effusions.                  Workstation performed: [id number]         CT facial bones with contrast   Final Result by Alena Casanova MD (06/02 1620)      Evidence of chronic right otomastoiditis.      Mild dental caries.      No acute findings.         Workstation performed: [id number]         XR chest 2 views   Final Result by Ingrid White MD (06/02 1436)      Lungs clear.      Trace right effusion on the lateral projection.            Workstation performed: [id number]           Results from last 7 days   Lab Units 06/02/24  1525   SARS-COV-2  Negative     Results from last 7 days   Lab Units 06/03/24  0545 06/02/24  1316 06/01/24  1105   WBC Thousand/uL 3.45* 6.78 3.19*   HEMOGLOBIN g/dL 10.5* 12.3 12.7   HEMATOCRIT % 31.3* 36.8 38.5   PLATELETS Thousands/uL 133* 166 158   TOTAL NEUT ABS  Thousands/µL  --   --  1.18*   BANDS PCT %  --  7  --          Results from last 7 days   Lab Units 06/03/24  0545 06/02/24  1316 06/01/24  1105   SODIUM mmol/L 130* 130* 133*   POTASSIUM mmol/L 3.6 4.3 4.2   CHLORIDE mmol/L 102 98 98   CO2 mmol/L 21 24 26   ANION GAP mmol/L 7 8 9   BUN mg/dL 13 18 14   CREATININE mg/dL 0.59* 0.81 0.70   EGFR ml/min/1.73sq m 97 76 91   CALCIUM mg/dL 8.0* 8.9 9.1   MAGNESIUM mg/dL  --  2.0  --    PHOSPHORUS mg/dL  --  3.2 1.9*     Results from last 7 days   Lab Units 06/03/24  0545 06/02/24  1316 06/01/24  1105   AST U/L 22 44* 28   ALT U/L 37 58* 35   ALK PHOS U/L 49 56 63   TOTAL PROTEIN g/dL 5.8* 6.7 6.9   ALBUMIN g/dL 3.5 4.0 4.1   TOTAL BILIRUBIN mg/dL 0.32 0.46 0.40         Results from last 7 days   Lab Units 06/03/24  0545 06/02/24  1316   GLUCOSE RANDOM mg/dL 119 135           Results from last 7 days   Lab Units 06/02/24  1316 06/01/24  1105   CK TOTAL U/L 32 35     Results from last 7 days   Lab Units 06/02/24  1525 06/02/24  1316   HS TNI 0HR ng/L  --  <2   HS TNI 2HR ng/L <2  --              Results from last 7 days   Lab Units 06/03/24  0545 06/01/24  1105   TSH 3RD GENERATON uIU/mL 0.074* 0.209*     Results from last 7 days   Lab Units 06/03/24  0545 06/02/24  1316   PROCALCITONIN ng/ml 0.44* 1.09*           Results from last 7 days   Lab Units 06/01/24  1105   IRON ug/dL 51           Results from last 7 days   Lab Units 06/02/24  1316 06/01/24  1105   CRP mg/L 82.1* 80.3*   SED RATE mm/hour 11 25             Results from last 7 days   Lab Units 06/02/24  2241   CLARITY UA  Clear   COLOR UA  Light Yellow   SPEC GRAV UA  >=1.050*   PH UA  6.5   GLUCOSE UA mg/dl Negative   KETONES UA mg/dl Trace*   BLOOD UA  Negative   PROTEIN UA mg/dl 30 (1+)*   NITRITE UA  Negative   BILIRUBIN UA  Negative   UROBILINOGEN UA (BE) mg/dl <2.0   LEUKOCYTES UA  Negative   WBC UA /hpf 1-2   RBC UA /hpf 1-2   BACTERIA UA /hpf Occasional   EPITHELIAL CELLS WET PREP /hpf Occasional     Results  from last 7 days   Lab Units 06/02/24  1525   INFLUENZA A PCR  Negative   INFLUENZA B PCR  Negative   RSV PCR  Negative         ED Treatment:   Medication Administration from 06/02/2024 1216 to 06/03/2024 0031         Date/Time Order Dose Route Action     06/02/2024 1413 EDT dexamethasone (PF) (DECADRON) injection 10 mg 10 mg Intravenous Given     06/02/2024 1412 EDT metoclopramide (REGLAN) injection 10 mg 10 mg Intravenous Given     06/02/2024 1411 EDT diphenhydrAMINE (BENADRYL) injection 25 mg 25 mg Intravenous Given     06/02/2024 1415 EDT magnesium sulfate 2 g/50 mL IVPB (premix) 2 g 2 g Intravenous New Bag     06/02/2024 1409 EDT sodium chloride 0.9 % bolus 1,000 mL 1,000 mL Intravenous New Bag     06/02/2024 1511 EDT iohexol (OMNIPAQUE) 350 MG/ML injection (MULTI-DOSE) 70 mL 70 mL Intravenous Given     06/02/2024 1527 EDT ketorolac (TORADOL) injection 15 mg 15 mg Intravenous Given     06/02/2024 1710 EDT lactated ringers bolus 1,000 mL 1,000 mL Intravenous New Bag     06/02/2024 1630 EDT acetaminophen (Ofirmev) injection 1,000 mg 1,000 mg Intravenous New Bag     06/02/2024 1714 EDT azithromycin (ZITHROMAX) 500 mg in sodium chloride 0.9% 250mL IVPB 500 mg 500 mg Intravenous New Bag     06/02/2024 2236 EDT nortriptyline (PAMELOR) capsule 10 mg 10 mg Oral Given     06/02/2024 1924 EDT doxycycline hyclate (VIBRAMYCIN) capsule 100 mg 100 mg Oral Given     06/02/2024 2233 EDT ketorolac (TORADOL) injection 30 mg 30 mg Intravenous Given     06/02/2024 2232 EDT metoclopramide (REGLAN) injection 10 mg 10 mg Intravenous Given     06/02/2024 2232 EDT diphenhydrAMINE (BENADRYL) injection 25 mg 25 mg Intravenous Given     06/02/2024 1926 EDT sodium chloride 0.9 % infusion 100 mL/hr Intravenous New Bag     06/02/2024 1924 EDT rimegepant sulfate (NURTEC) disintegrating tablet 75 mg 75 mg Oral Given     06/02/2024 1924 EDT ceftriaxone (ROCEPHIN) 2 g/50 mL in dextrose IVPB 2,000 mg Intravenous New Bag          Past Medical  History:   Diagnosis Date    Depression     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 female  Admission Orders:  Scheduled Medications:  buPROPion, 300 mg, Oral, QAM  cefTRIAXone, 2,000 mg, Intravenous, Q12H  diphenhydrAMINE, 25 mg, Intravenous, Q8H CHANG  doxycycline hyclate, 100 mg, Oral, Q12H  enoxaparin, 40 mg, Subcutaneous, Daily  fluticasone, 2 spray, Nasal, Daily  ketorolac, 30 mg, Intravenous, Q8H CHANG  magnesium sulfate, 2 g, Intravenous, Q24H  metoclopramide, 10 mg, Intravenous, Q8H CHANG  nortriptyline, 10 mg, Oral, HS      Continuous IV Infusions:     PRN Meds:  cyclobenzaprine, 10 mg, Oral, TID PRN        IP CONSULT TO NEUROLOGY    Network Utilization Review Department  ATTENTION: Please call with any questions or concerns to [phone] and carefully listen to the prompts so that you are directed to the right person. All voicemails are confidential.   For Discharge needs, contact Care Management DC Support Team at [phone] opt. [phone]  Send all requests for admission clinical reviews, approved or denied determinations and any other requests to dedicated fax number below belonging to the campus where the patient is receiving treatment. List of dedicated fax numbers for the Facilities:  FACILITY NAME UR FAX NUMBER   ADMISSION DENIALS (Administrative/Medical Necessity) [phone]   DISCHARGE SUPPORT TEAM (NETWORK) [phone]   PARENT CHILD HEALTH (Maternity/NICU/Pediatrics) [phone]   [facility] [phone]   [facility]  [phone]   [facility] [phone]   [facility] [phone]   [facility] [phone]   [facility] [phone]   [facility] [phone]   [facility] [phone]   [facility] [phone]   [facility] [phone]   [facility] [phone]   [facility] [phone]

## 2024-06-03 NOTE — PLAN OF CARE
Problem: Potential for Falls  Goal: Patient will remain free of falls  Description: INTERVENTIONS:  - Educate patient/family on patient safety including physical limitations  - Instruct patient to call for assistance with activity   - Consult OT/PT to assist with strengthening/mobility   - Keep Call bell within reach  - Keep bed low and locked with side rails adjusted as appropriate  - Keep care items and personal belongings within reach  - Initiate and maintain comfort rounds  - Make Fall Risk Sign visible to staff  - Offer Toileting every Hours, in advance of need  - Initiate/Maintain alarm  - Obtain necessary fall risk management equipment:   - Apply yellow socks and bracelet for high fall risk patients  - Consider moving patient to room near nurses station  Outcome: Progressing

## 2024-06-03 NOTE — ASSESSMENT & PLAN NOTE
Unclear etiology in the setting of poor oral intake versus pain.  S/p 1 L of IV fluids.  Trend daily labs  Osm studies pending

## 2024-06-03 NOTE — ASSESSMENT & PLAN NOTE
Mildly elevated LFTs.  Denies any abdominal pain, diarrhea. Normal total bili and alk phos  Suspected in setting of acute infection.  Rule out tickborne infection  Trend daily labs

## 2024-06-03 NOTE — PLAN OF CARE
Problem: Potential for Falls  Goal: Patient will remain free of falls  Description: INTERVENTIONS:  - Educate patient/family on patient safety including physical limitations  - Instruct patient to call for assistance with activity   - Consult OT/PT to assist with strengthening/mobility   - Keep Call bell within reach  - Keep bed low and locked with side rails adjusted as appropriate  - Keep care items and personal belongings within reach  - Initiate and maintain comfort rounds  - Make Fall Risk Sign visible to staff  - Offer Toileting every 2 Hours, in advance of need  - Initiate/Maintain alarm  - Obtain necessary fall risk management equipment:   - Apply yellow socks and bracelet for high fall risk patients  - Consider moving patient to room near nurses station  6/3/2024 1401 by Marcella Pennington, RN  Outcome: Adequate for Discharge  6/3/2024 1251 by Marcella Pennington, RN  Outcome: Progressing

## 2024-06-03 NOTE — ASSESSMENT & PLAN NOTE
64-year-old right-handed female presented with intractable migraine headache for the past 3 days and it did not break with injectable sumatriptan.  In ED, patient received migraine cocktails.  CT head showed no acute abnormalities.  Right greater than left mastoid effusions.  Today, patient reports that her headache is much improved and she feels that migraine cocktail helped significantly.    Home migraine meds: Nortriptyline 10 mg nightly, as needed sumatriptan 100 mg, as needed sumatriptan subcutaneous 6 mg    Impression: Intractable migraine headache likely induced by recent travel and ear infection    Plan:  -Discussed plan with neurology attending, Dr. Ferrera. Please refer to the attestation for additional recommendation and plan  -Due to Raynaud's syndrome, recommend avoiding Sumatriptan since it causes vasoconstriction  -Continue home nortriptyline 10 mg nightly  -Recommend discharging patient with Nurtec 75 mg as needed.  Primary team is made aware.  -Since patient's headache is resolving, patient can be discharged from neurology standpoint.  Will defer to primary team  -Rest of medical management per primary team    -No further management from Neurology. Please call with questions or concerns

## 2024-06-03 NOTE — CONSULTS
NEUROLOGY RESIDENCY CONSULT NOTE     Name: Josephine Holt   Age & Sex: 64 y.o. female   MRN: [id number]  Unit/Bed#: S -01   Encounter: [id number]  Length of Stay: 0    ASSESSMENT & PLAN     * Intractable headache  Assessment & Plan  64-year-old right-handed female presented with intractable migraine headache for the past 3 days and it did not break with injectable sumatriptan.  In ED, patient received migraine cocktails.  CT head showed no acute abnormalities.  Right greater than left mastoid effusions.  Today, patient reports that her headache is much improved and she feels that migraine cocktail helped significantly.    Home migraine meds: Nortriptyline 10 mg nightly, as needed sumatriptan 100 mg, as needed sumatriptan subcutaneous 6 mg    Impression: Intractable migraine headache likely induced by recent travel and ear infection    Plan:  -Discussed plan with neurology attending, Dr. Ferrera. Please refer to the attestation for additional recommendation and plan  -Due to Raynaud's syndrome, recommend avoiding Sumatriptan since it causes vasoconstriction  -Continue home nortriptyline 10 mg nightly  -Recommend discharging patient with Nurtec 75 mg as needed.  Primary team is made aware.  -Since patient's headache is resolving, patient can be discharged from neurology standpoint.  Will defer to primary team  -Rest of medical management per primary team    -No further management from Neurology. Please call with questions or concerns        Josephine Holt will need follow up in 4- 6 weeks with headache attending or AP.  She will not require outpatient neurological testing.        SUBJECTIVE     Reason for Consult / Principal Problem: migraine headache  Hx and PE limited by: none    HPI: Josephine Holt is a 64 y.o. right handed female with migraine headache, hypothyroidism, depression/anxiety who presents with intractable headache for the past 3 days.  Since Thursday, May 30, patient started having  migraine headache with constant vomiting and photophobia.  She took her rescue injectable sumatriptan without any improvement of her symptoms.  She has migraine headaches 2-3 times per month.  However this 1 is the worst migraine she ever had, which prompted her to come to the ED.  She recently came back from [place] and had ear infection at the beginning of May for which she finished the course of antibiotic.  In ED, patient received migraine cocktails.  CT head showed no acute abnormalities.  Right greater than left mastoid effusions. Procalcitonin was elevated. Patient was admitted for further migraine headache management and infectious workup.    This AM, patient reports that her headache is much improved.  Currently it is 2/10.  She has whitening of her fingers bilaterally.  She thinks that she has Raynaud's syndrome and she usually gets her fingers whitened with Sumatriptan. She is not following with a rheumatologist.     Otherwise, she denied fevers/chills, shortness of breath, nausea/vomiting and any other neurological symptoms/deficits.    Inpatient consult to Neurology  Consult performed by: Salvatore Del Toro MD  Consult ordered by: Ana Murphy MD          Historical Information   Past Medical History:   Diagnosis Date    Depression     Non-ulcer dyspepsia 11/25/2015    Periorbital edema of both eyes 05/27/2021    Sleep apnea, obstructive      Past Surgical History:   Procedure Laterality Date    HYSTERECTOMY  2010    has ovaries    US GUIDANCE BREAST BIOPSY LEFT EACH ADDITIONAL Left 6/12/2023    US GUIDED BREAST BIOPSY LEFT COMPLETE Left 6/12/2023     Social History   Social History     Substance and Sexual Activity   Alcohol Use Never     Social History     Substance and Sexual Activity   Drug Use Never     E-Cigarette/Vaping    E-Cigarette Use Never User      E-Cigarette/Vaping Substances    Nicotine No     THC No     CBD No     Flavoring No     Other No     Unknown No      Social History     Tobacco  Use   Smoking Status Never   Smokeless Tobacco Never     Family History:   Family History   Problem Relation Age of Onset    Hypertension Mother     Multiple myeloma Father     No Known Problems Sister     No Known Problems Daughter     No Known Problems Daughter     No Known Problems Maternal Grandmother     No Known Problems Maternal Grandfather     No Known Problems Paternal Grandmother     No Known Problems Paternal Grandfather      Meds/Allergies   PTA meds:   Prior to Admission Medications   Prescriptions Last Dose Informant Patient Reported? Taking?   LORazepam (Ativan) 0.5 mg tablet More than a month  No No   Sig: Take 1 tablet (0.5 mg total) by mouth every 8 (eight) hours as needed for anxiety   Progesterone 100 MG CAPS 2024  No Yes   Sig: Take 100 mg by mouth at bedtime   SUMAtriptan (IMITREX) 100 mg tablet 2024  No Yes   Sig: Take 1 tablet (100 mg total) by mouth 2 (two) times a day as needed for migraine   SUMAtriptan (IMITREX) 6 mg/0.5 mL 2024  No Yes   Sig: Inject 0.5 mL (6 mg total) under the skin once as needed for migraine   Trintellix 20 MG tablet 2024 Self Yes Yes   Sig: Take 20 mg by mouth in the morning   buPROPion (WELLBUTRIN XL) 300 mg 24 hr tablet 2024 Self No Yes   Sig: Take 1 tablet (300 mg total) by mouth every morning   cefuroxime (CEFTIN) 500 mg tablet   No No   Sig: Take 1 tablet (500 mg total) by mouth every 12 (twelve) hours for 15 days   cyclobenzaprine (FLEXERIL) 10 mg tablet Past Week Self No Yes   Sig: Take 1 tablet (10 mg total) by mouth 3 (three) times a day as needed for muscle spasms   estradiol (Soniya) 0.05 MG/24HR 2024  No Yes   Sig: Place 1 patch on the skin 2 (two) times a week   fluticasone (FLONASE) 50 mcg/act nasal spray Past Week  No Yes   Si sprays into each nostril daily   levothyroxine (Synthroid) 50 mcg tablet 2024  No Yes   Sig: Take 1 tablet (50 mcg total) by mouth daily   liothyronine (CYTOMEL) 25 mcg tablet 2024  Yes Yes    nortriptyline (PAMELOR) 10 mg capsule 2024 Self Yes Yes   Sig: Take 10 mg by mouth daily at bedtime   predniSONE 10 mg tablet Not Taking  No No   Sig: Take 1 tablet (10 mg total) by mouth daily   Patient not taking: Reported on 2024      Facility-Administered Medications: None     Allergies   Allergen Reactions    Pfizer Covid-19 Vac-Hugo 5-11y [Covid-19 Mrna Vacc (Moderna)] Rash     Pfizer- swollen eyes and body rash    Other        Review of previous medical records was completed.      Review of Systems   Constitutional:  Negative for chills and fever.   HENT:  Negative for ear pain and sore throat.    Eyes:  Negative for pain and visual disturbance.   Respiratory:  Negative for cough and shortness of breath.    Cardiovascular:  Negative for chest pain and palpitations.   Gastrointestinal:  Negative for abdominal pain and vomiting.   Genitourinary:  Negative for dysuria and hematuria.   Musculoskeletal:  Negative for arthralgias and back pain.   Skin:  Negative for color change and rash.   Neurological:  Positive for headaches. Negative for seizures, syncope, speech difficulty and weakness.   All other systems reviewed and are negative.      OBJECTIVE     Patient ID: Josephine Holt is a 64 y.o. female.    Vitals:   Vitals:    24 2100 24 0042 24 0045 24 0809   BP: 134/66 119/66  126/74   BP Location:       Pulse: 66  80 90   Resp: 16      Temp:  98.1 °F (36.7 °C)  98.8 °F (37.1 °C)   TempSrc:       SpO2: 96%  95% 99%      There is no height or weight on file to calculate BMI.     Intake/Output Summary (Last 24 hours) at 6/3/2024 1623  Last data filed at 6/3/2024 1300  Gross per 24 hour   Intake 1530 ml   Output --   Net 1530 ml       Temperature:   Temp (24hrs), Av.5 °F (36.9 °C), Min:98.1 °F (36.7 °C), Max:98.8 °F (37.1 °C)    Temperature: 98.8 °F (37.1 °C)    Invasive Devices:   Invasive Devices       None                   Physical Exam  Vitals and nursing note  reviewed.   Constitutional:       General: She is not in acute distress.     Appearance: She is well-developed.   HENT:      Head: Normocephalic and atraumatic.   Eyes:      Extraocular Movements: Extraocular movements intact and EOM normal.      Conjunctiva/sclera: Conjunctivae normal.      Pupils: Pupils are equal, round, and reactive to light.   Cardiovascular:      Rate and Rhythm: Normal rate.   Pulmonary:      Effort: Pulmonary effort is normal.   Musculoskeletal:         General: No swelling.      Cervical back: Neck supple.   Skin:     General: Skin is warm.   Neurological:      Mental Status: She is alert and oriented to person, place, and time.      Motor: Motor strength is normal.     Coordination: Finger-Nose-Finger Test normal.      Gait: Gait is intact.   Psychiatric:         Speech: Speech normal.          Neurologic Exam     Mental Status   Oriented to person, place, and time.   Speech: speech is normal   Level of consciousness: alert    Cranial Nerves     CN II   Visual fields full to confrontation.     CN III, IV, VI   Pupils are equal, round, and reactive to light.  Extraocular motions are normal.     CN V   Facial sensation intact.     CN VII   Facial expression full, symmetric.     CN VIII   CN VIII normal.     CN IX, X   CN IX normal.   CN X normal.     CN XI   CN XI normal.     CN XII   CN XII normal.     Motor Exam   Muscle bulk: normal  Overall muscle tone: normal    Strength   Strength 5/5 throughout.     Sensory Exam   Light touch normal.     Gait, Coordination, and Reflexes     Gait  Gait: normal    Coordination   Finger to nose coordination: normal    Reflexes   Reflexes 2+ except as noted.      LABORATORY DATA     Labs: I have personally reviewed pertinent reports.    Results from last 7 days   Lab Units 06/03/24  0545 06/02/24  1316 06/01/24  1105   WBC Thousand/uL 3.45* 6.78 3.19*   HEMOGLOBIN g/dL 10.5* 12.3 12.7   HEMATOCRIT % 31.3* 36.8 38.5   PLATELETS Thousands/uL 133* 166 158    SEGS PCT %  --   --  37*   MONO PCT %  --  2* 12   EOS PCT %  --  2 3      Results from last 7 days   Lab Units 06/03/24  0545 06/02/24  1316 06/01/24  1105   POTASSIUM mmol/L 3.6 4.3 4.2   CHLORIDE mmol/L 102 98 98   CO2 mmol/L 21 24 26   BUN mg/dL 13 18 14   CREATININE mg/dL 0.59* 0.81 0.70   CALCIUM mg/dL 8.0* 8.9 9.1   ALK PHOS U/L 49 56 63   ALT U/L 37 58* 35   AST U/L 22 44* 28     Results from last 7 days   Lab Units 06/02/24  1316   MAGNESIUM mg/dL 2.0     Results from last 7 days   Lab Units 06/02/24  1316 06/01/24  1105   PHOSPHORUS mg/dL 3.2 1.9*                    IMAGING & DIAGNOSTIC TESTING     Radiology Results: I have personally reviewed pertinent reports.    CT head wo contrast   Final Result by Pallav N Shah, MD (06/02 1948)      No acute intracranial abnormality.      Right greater than left mastoid effusions.                  Workstation performed: [id number]         CT facial bones with contrast   Final Result by Alena Casanova MD (06/02 1620)      Evidence of chronic right otomastoiditis.      Mild dental caries.      No acute findings.         Workstation performed: [id number]         XR chest 2 views   Final Result by Ingrid White MD (06/02 1436)      Lungs clear.      Trace right effusion on the lateral projection.            Workstation performed: [id number]             Other Diagnostic Testing: I have personally reviewed pertinent reports.      ACTIVE MEDICATIONS     No current facility-administered medications for this encounter.       Prior to Admission medications    Medication Sig Start Date End Date Taking? Authorizing Provider   buPROPion (WELLBUTRIN XL) 300 mg 24 hr tablet Take 1 tablet (300 mg total) by mouth every morning 1/8/19  Yes Daniela Pa MD   cyclobenzaprine (FLEXERIL) 10 mg tablet Take 1 tablet (10 mg total) by mouth 3 (three) times a day as needed for muscle spasms 2/23/21  Yes Susy Willis MD   estradiol (Soniya) 0.05 MG/24HR Place 1 patch on the  skin 2 (two) times a week 11/23/23  Yes Esmer Soto MD   fluticasone (FLONASE) 50 mcg/act nasal spray 2 sprays into each nostril daily 5/21/24  Yes Ilsa Napier PA-C   levothyroxine (Synthroid) 50 mcg tablet Take 1 tablet (50 mcg total) by mouth daily 5/9/24 5/9/25 Yes Hayden Holt MD   liothyronine (CYTOMEL) 25 mcg tablet  8/9/23  Yes Historical Provider, MD   nortriptyline (PAMELOR) 10 mg capsule Take 10 mg by mouth daily at bedtime 4/18/19  Yes Historical Provider, MD   Progesterone 100 MG CAPS Take 100 mg by mouth at bedtime 11/21/23  Yes Esmer Soto MD   SUMAtriptan (IMITREX) 100 mg tablet Take 1 tablet (100 mg total) by mouth 2 (two) times a day as needed for migraine 5/22/24 6/21/24 Yes Hayden Holt MD   SUMAtriptan (IMITREX) 6 mg/0.5 mL Inject 0.5 mL (6 mg total) under the skin once as needed for migraine 5/31/24 6/30/24 Yes Hayden Holt MD   Trintellix 20 MG tablet Take 20 mg by mouth in the morning 12/6/22  Yes Historical Provider, MD   LORazepam (Ativan) 0.5 mg tablet Take 1 tablet (0.5 mg total) by mouth every 8 (eight) hours as needed for anxiety 12/21/23   Faby Crane PA-C   predniSONE 10 mg tablet Take 1 tablet (10 mg total) by mouth daily  Patient not taking: Reported on 5/21/2024 5/8/24   Hayden Holt MD         CODE STATUS & ADVANCED DIRECTIVES     Code Status: Prior  Advance Directive and Living Will:      Power of :    POLST:      VTE Mechanical Prophylaxis: sequential compression device    ==    Salvatore Del Toro MD   [facility] Neurology Residency, PGY-III

## 2024-06-03 NOTE — ASSESSMENT & PLAN NOTE
Continue with Wellbutrin as well as nortriptyline  Patient on home on Trintellix.  Not caring medication in our pharmacy.

## 2024-06-03 NOTE — ASSESSMENT & PLAN NOTE
Abnormal thyroid function test done the patient yesterday.  TSH low, T4 low and T3 high  Will hold home thyroid medications   Reassess thyroid function test in 3 to 4 weeks

## 2024-06-03 NOTE — DISCHARGE INSTR - AVS FIRST PAGE
Dear Josephine Holt,     It was our pleasure to care for you here at [facility].  It is our hope that we were always able to exceed the expected standards for your care during your stay.  You were hospitalized due to intractable headache.  You were cared for on the medicine floor by Jacinda Grayson MD under the service of Denny Matias MD with the [facility] who covers for your primary care physician (PCP), No primary care provider on file., while you were hospitalized.  If you have any questions or concerns related to this hospitalization, you may contact us at .  For follow up as well as any medication refills, we recommend that you follow up with your primary care physician.  A registered nurse will reach out to you by phone within a few days after your discharge to answer any additional questions that you may have after going home.  However, at this time we provide for you here, the most important instructions / recommendations at discharge:     Notable Medication Adjustments -   START Doxycycline 100 mg twice daily for total of 10 days  STOP taking Synthroid and Cytomel  STOP Imitrex per neurology as this medication is not recommended in patients with history of Raynaud's disease, can discuss further with your primary care doctor as an outpatient  Testing Required after Discharge -   Follow-up TSH, T3, and T4 studies within the next 3 to 4 weeks  ** Please contact your PCP to request testing orders for any of the testing recommended here **  Important follow up information -   Follow-up with your primary care doctor, discussed workup of tickborne illness and infectious findings  Follow-up with neurology as an outpatient  Other Instructions -   Take all medications as indicated  If any new or worsening symptoms, contact your PCP or go to the nearest emergency department  Please review this entire after visit summary as additional  general instructions including medication list, appointments, activity, diet, any pertinent wound care, and other additional recommendations from your care team that may be provided for you.      Sincerely,     Jacinda Grayson MD

## 2024-06-03 NOTE — ASSESSMENT & PLAN NOTE
Mildly elevated LFTs.  Denies any abdominal pain, diarrhea. Normal total bili and alk phos  Suspected in setting of acute infection.  Rule out tickborne infection

## 2024-06-03 NOTE — ASSESSMENT & PLAN NOTE
Currently taking at home levothyroxine 50 mcg and liothyronine 25 mcg daily.  Will hold thyroid medication given abnormal thyroid tests.  Pending new set of thyroid function test

## 2024-06-03 NOTE — ASSESSMENT & PLAN NOTE
Currently taking at home levothyroxine 50 mcg and liothyronine 25 mcg daily.  Recommend stopping both thyroid medications due to low TSH  Repeat thyroid studies in 3 to 4 weeks with primary care doctor

## 2024-06-03 NOTE — ASSESSMENT & PLAN NOTE
Presents with severe uncontrollable headaches for the past 3 days.  Patient normally takes Imitrex oral as well as injectable for migraines however no improvement after home medication.  Diffuse head pain.  Reports 1 episode of vomiting and photophobia, no meningeal signs on exam.  Patient also complaining of generalized body ache, low-grade fever Tmax 99, chills.  Have treated bilateral acute otitis with amoxicillin and Ceftin as well as steroids with some improvement in infection however the decreased b/l hearing.  Patient came back from [place] a month ago and ever since she started to have ear issues.  CT facial bones showed signs of evidence of chronic right otomastoiditis with no acute findings.  CRP 80, procalcitonin 1.09, WBC normal 6k, however at baseline patient leukopenic ever since COVID-vaccine 2021 (relative leukocytosis).  Elevated LFTs.    Differentials: CNS infection vs tickborne infection vs intractable migraine vs rule out cerebral venous thrombosis given patient on hormonal replacement therapy, less likely temporal arteritis patient does not complain of any vision changes.    Plan:  CT head unremarkable   Started doxycycline to cover for tickborne infection, continue course for 10 days  Follow with neurology as an outpatient  Pending tickborne panel, [place]  Pending JESSICA  Pending blood cultures

## 2024-06-03 NOTE — ASSESSMENT & PLAN NOTE
Abnormal thyroid function test done the patient yesterday.  TSH low, T4 low and T3 high  Will hold home thyroid medications - consider restarting once new results return   Reassess thyroid function test.

## 2024-06-03 NOTE — PROGRESS NOTES
[facility]  Progress Note  Name: Josephine Holt I  MRN: [id number]  Unit/Bed#: S -01 I Date of Admission: 6/2/2024   Date of Service: 6/3/2024 I Hospital Day: 0    Assessment & Plan   * Intractable headache  Assessment & Plan  Presents with severe uncontrollable headaches for the past 3 days.  Patient normally takes Imitrex oral as well as injectable for migraines however no improvement after home medication.  Diffuse head pain.  Reports 1 episode of vomiting and photophobia, no meningeal signs on exam.  Patient also complaining of generalized body ache, low-grade fever Tmax 99, chills.  Have treated bilateral acute otitis with amoxicillin and Ceftin as well as steroids with some improvement in infection however the decreased b/l hearing.  Patient came back from [place] a month ago and ever since she started to have ear issues.  CT facial bones showed signs of evidence of chronic right otomastoiditis with no acute findings.  CRP 80, procalcitonin 1.09, WBC normal 6k, however at baseline patient leukopenic ever since COVID-vaccine 2021 (relative leukocytosis).  Elevated LFTs.    Differentials: CNS infection vs tickborne infection vs intractable migraine vs rule out cerebral venous thrombosis given patient on hormonal replacement therapy, less likely temporal arteritis patient does not complain of any vision changes.    Plan:  CT head unremarkable   Started doxycycline to cover for tickborne infection and high-dose ceftriaxone 2 g every 12 for CNS infection  Neurology team consulted appreciated input  For pain ordered migraine cocktail with ketorolac on Nurtec  Pending tickborne panel, West Nile  Pending JESSICA  Pending blood cultures          Hyponatremia  Assessment & Plan  Unclear etiology in the setting of poor oral intake versus pain.  S/p 1 L of IV fluids.  Trend daily labs  Osm studies pending    Abnormal thyroid function test  Assessment & Plan  Abnormal thyroid function test  done the patient yesterday.  TSH low, T4 low and T3 high  Will hold home thyroid medications - consider restarting once new results return   Reassess thyroid function test.    Elevated LFTs  Assessment & Plan  Mildly elevated LFTs.  Denies any abdominal pain, diarrhea. Normal total bili and alk phos  Suspected in setting of acute infection.  Rule out tickborne infection  Trend daily labs    Generalized body aches  Assessment & Plan  Presenting with bodyaches, chills, low-grade fever.  Suspicion for infection versus inflammatory.  Pending infectious workup as well as JESSICA.      Hypothyroidism  Assessment & Plan  Currently taking at home levothyroxine 50 mcg and liothyronine 25 mcg daily.  Will hold thyroid medication given abnormal thyroid tests.  Pending new set of thyroid function test    Depression with anxiety  Assessment & Plan  Continue with Wellbutrin as well as nortriptyline  Patient on home on Trintellix.  Not caring medication in our pharmacy.  Patient can bring from home if she prefers to to continue.    Common migraine without aura  Assessment & Plan  History of migraines treating with Imitrex as needed and nortriptyline 10 mg at bedtime.  Intractable headache for the past 3 days.  See plan above.               VTE Pharmacologic Prophylaxis: VTE Score: 3 Moderate Risk (Score 3-4) - Pharmacological DVT Prophylaxis Ordered: enoxaparin (Lovenox).    Mobility:   Basic Mobility Inpatient Raw Score: 24  JH-HLM Goal: 8: Walk 250 feet or more  JH-HLM Achieved: 1: Laying in bed  JH-HLM Goal achieved. Continue to encourage appropriate mobility.    Patient Centered Rounds: I performed bedside rounds with nursing staff today.   Discussions with Specialists or Other Care Team Provider: Neurology     Education and Discussions with Family / Patient: Patient declined call to .     Total Time Spent on Date of Encounter in care of patient: 25 mins. This time was spent on one or more of the following:  performing physical exam; counseling and coordination of care; obtaining or reviewing history; documenting in the medical record; reviewing/ordering tests, medications or procedures; communicating with other healthcare professionals and discussing with patient's family/caregivers.    Current Length of Stay: 0 day(s)  Current Patient Status: Observation   Certification Statement: The patient will continue to require additional inpatient hospital stay due to headache, infectious workup  Discharge Plan: Anticipate discharge in 24-48 hrs to home.    Code Status: Level 1 - Full Code    Subjective:   Patient seen and examined at bedside, no acute events overnight.  Patient states that her headache has improved and only feels a dull pleated the posterior part of her head.  No nausea, vomiting, diarrhea, fevers or chills.  She was having a neurology consult as well.    Objective:     Vitals:   Temp (24hrs), Av.3 °F (36.8 °C), Min:98 °F (36.7 °C), Max:98.8 °F (37.1 °C)    Temp:  [98 °F (36.7 °C)-98.8 °F (37.1 °C)] 98.8 °F (37.1 °C)  HR:  [64-90] 90  Resp:  [16-18] 16  BP: (119-144)/(61-74) 126/74  SpO2:  [93 %-99 %] 99 %  There is no height or weight on file to calculate BMI.     Input and Output Summary (last 24 hours):     Intake/Output Summary (Last 24 hours) at 6/3/2024 0816  Last data filed at 6/3/2024 0201  Gross per 24 hour   Intake 2400 ml   Output --   Net 2400 ml       Physical Exam:   Physical Exam  Constitutional:       General: She is not in acute distress.     Appearance: Normal appearance. She is not ill-appearing.   HENT:      Head: Normocephalic and atraumatic.      Right Ear: External ear normal.      Left Ear: External ear normal.      Nose: Nose normal.      Mouth/Throat:      Mouth: Mucous membranes are moist.      Pharynx: Oropharynx is clear.   Eyes:      Extraocular Movements: Extraocular movements intact.   Cardiovascular:      Rate and Rhythm: Normal rate and regular rhythm.      Heart sounds:  No murmur heard.  Pulmonary:      Effort: Pulmonary effort is normal. No respiratory distress.      Breath sounds: Normal breath sounds. No wheezing or rales.   Abdominal:      General: There is no distension.      Palpations: Abdomen is soft.      Tenderness: There is no abdominal tenderness. There is no guarding.   Musculoskeletal:         General: No swelling or tenderness.   Skin:     General: Skin is warm and dry.   Neurological:      Mental Status: She is alert and oriented to person, place, and time.   Psychiatric:         Mood and Affect: Mood normal.          Additional Data:     Labs:  Results from last 7 days   Lab Units 06/03/24  0545 06/02/24  1316 06/01/24  1105   WBC Thousand/uL 3.45* 6.78 3.19*   HEMOGLOBIN g/dL 10.5* 12.3 12.7   HEMATOCRIT % 31.3* 36.8 38.5   PLATELETS Thousands/uL 133* 166 158   BANDS PCT %  --  7  --    SEGS PCT %  --   --  37*   LYMPHO PCT %  --  4* 47*   MONO PCT %  --  2* 12   EOS PCT %  --  2 3     Results from last 7 days   Lab Units 06/03/24  0545   SODIUM mmol/L 130*   POTASSIUM mmol/L 3.6   CHLORIDE mmol/L 102   CO2 mmol/L 21   BUN mg/dL 13   CREATININE mg/dL 0.59*   ANION GAP mmol/L 7   CALCIUM mg/dL 8.0*   ALBUMIN g/dL 3.5   TOTAL BILIRUBIN mg/dL 0.32   ALK PHOS U/L 49   ALT U/L 37   AST U/L 22   GLUCOSE RANDOM mg/dL 119                 Results from last 7 days   Lab Units 06/03/24  0545 06/02/24  1316   PROCALCITONIN ng/ml 0.44* 1.09*       Lines/Drains:  Invasive Devices       Peripheral Intravenous Line  Duration             Peripheral IV 06/02/24 Right Antecubital 1 day                          Imaging: Reviewed radiology reports from this admission including: CT head    Recent Cultures (last 7 days):         Last 24 Hours Medication List:   Current Facility-Administered Medications   Medication Dose Route Frequency Provider Last Rate    buPROPion  300 mg Oral QAM Ana Murphy MD      cefTRIAXone  2,000 mg Intravenous Q12H Ana Murphy MD 2,000 mg (06/02/24  1924)    cyclobenzaprine  10 mg Oral TID PRN Ana Murphy MD      diphenhydrAMINE  25 mg [facility]venous Q8H [facility]      doxycycline hyclate  100 mg Oral Q12H Ana Murphy MD      enoxaparin  40 mg Subcutaneous Daily Ana Murphy MD      fluticasone  2 spray Nasal Daily Ana Murphy MD      ketorolac  30 mg Intravenous Q8H [facility] MD      magnesium sulfate  2 g Intravenous Q24H Ana Murphy MD      metoclopramide  10 mg Intravenous Q8H [facility] MD      nortriptyline  10 mg Oral HS Ana Murphy MD          Today, Patient Was Seen By: Jacinda Grayson MD    **Please Note: This note may have been constructed using a voice recognition system.**

## 2024-06-04 PROBLEM — H66.93 ACUTE BILATERAL OTITIS MEDIA: Status: RESOLVED | Noted: 2024-05-05 | Resolved: 2024-06-04

## 2024-06-04 LAB
WNV IGG SER QL IA: NEGATIVE
WNV IGM SER QL IA: NEGATIVE

## 2024-06-05 ENCOUNTER — DOCUMENTATION (OUTPATIENT)
Dept: OTHER | Facility: HOSPITAL | Age: 64
End: 2024-06-05

## 2024-06-05 DIAGNOSIS — M79.10 MYALGIA: Primary | ICD-10-CM

## 2024-06-05 DIAGNOSIS — I73.00 RAYNAUD'S PHENOMENON: ICD-10-CM

## 2024-06-05 DIAGNOSIS — R52 GENERALIZED BODY ACHES: ICD-10-CM

## 2024-06-05 DIAGNOSIS — L23.9 CUTANEOUS HYPERSENSITIVITY: ICD-10-CM

## 2024-06-05 DIAGNOSIS — I73.00 RAYNAUD'S PHENOMENON WITHOUT GANGRENE: ICD-10-CM

## 2024-06-05 LAB
A PHAGOCYTOPH IGG TITR SER IF: NEGATIVE {TITER}
A PHAGOCYTOPH IGM TITR SER IF: NEGATIVE {TITER}
ANA HOMOGEN SER QL IF: NORMAL
ANA HOMOGEN TITR SER: NORMAL {TITER}
B MICROTI IGG TITR SER: NORMAL {TITER}
B MICROTI IGM TITR SER: NORMAL {TITER}
DSDNA AB SER-ACNC: <4 IU/ML
E CHAFFEENSIS IGG TITR SER IF: NEGATIVE {TITER}
E CHAFFEENSIS IGM TITR SER IF: NEGATIVE {TITER}
ENA RNP AB SER IA-ACNC: POSITIVE
ENA SM AB SER IA-ACNC: NEGATIVE
ENA SM+RNP IGG SER-ACNC: POSITIVE
ENA SS-A AB SER IA-ACNC: NEGATIVE
ENA SS-B AB SER IA-ACNC: NEGATIVE
RESULT/COMMENT: NORMAL
RESULT/COMMENT: NORMAL

## 2024-06-06 DIAGNOSIS — R52 GENERALIZED BODY ACHES: ICD-10-CM

## 2024-06-06 DIAGNOSIS — L23.9 CUTANEOUS HYPERSENSITIVITY: ICD-10-CM

## 2024-06-06 DIAGNOSIS — H04.123 DRY EYES, BILATERAL: ICD-10-CM

## 2024-06-06 DIAGNOSIS — M35.1 MIXED CONNECTIVE TISSUE DISEASE (HCC): Primary | ICD-10-CM

## 2024-06-06 DIAGNOSIS — M35.1 MCTD (MIXED CONNECTIVE TISSUE DISEASE) (HCC): Primary | ICD-10-CM

## 2024-06-06 NOTE — PROGRESS NOTES
Patient with some significant abnormalities on lab work suggestive of mixed connective tissue disorder.  We will be ordering a series of additional labs to evaluate her illness.

## 2024-06-07 ENCOUNTER — APPOINTMENT (OUTPATIENT)
Dept: LAB | Facility: AMBULARY SURGERY CENTER | Age: 64
End: 2024-06-07
Payer: COMMERCIAL

## 2024-06-07 ENCOUNTER — DOCUMENTATION (OUTPATIENT)
Dept: OTHER | Facility: HOSPITAL | Age: 64
End: 2024-06-07

## 2024-06-07 DIAGNOSIS — R52 GENERALIZED BODY ACHES: ICD-10-CM

## 2024-06-07 DIAGNOSIS — R11.0 NAUSEA: Primary | ICD-10-CM

## 2024-06-07 DIAGNOSIS — I73.00 RAYNAUD'S PHENOMENON WITHOUT GANGRENE: ICD-10-CM

## 2024-06-07 DIAGNOSIS — M79.10 MYALGIA: ICD-10-CM

## 2024-06-07 DIAGNOSIS — L23.9 CUTANEOUS HYPERSENSITIVITY: ICD-10-CM

## 2024-06-07 DIAGNOSIS — M35.1 MCTD (MIXED CONNECTIVE TISSUE DISEASE) (HCC): ICD-10-CM

## 2024-06-07 DIAGNOSIS — M35.1 MIXED CONNECTIVE TISSUE DISEASE (HCC): ICD-10-CM

## 2024-06-07 DIAGNOSIS — I73.00 RAYNAUD'S PHENOMENON: ICD-10-CM

## 2024-06-07 DIAGNOSIS — H04.123 DRY EYES, BILATERAL: ICD-10-CM

## 2024-06-07 LAB
A PHAGOCYTOPH DNA BLD QL NAA+PROBE: NEGATIVE
APTT PPP: 30 SECONDS (ref 23–37)
BNP SERPL-MCNC: 20 PG/ML (ref 0–100)

## 2024-06-07 PROCEDURE — 84165 PROTEIN E-PHORESIS SERUM: CPT

## 2024-06-07 PROCEDURE — 86200 CCP ANTIBODY: CPT

## 2024-06-07 PROCEDURE — 83880 ASSAY OF NATRIURETIC PEPTIDE: CPT

## 2024-06-07 PROCEDURE — 86147 CARDIOLIPIN ANTIBODY EA IG: CPT

## 2024-06-07 PROCEDURE — 36415 COLL VENOUS BLD VENIPUNCTURE: CPT

## 2024-06-07 PROCEDURE — 85730 THROMBOPLASTIN TIME PARTIAL: CPT

## 2024-06-07 PROCEDURE — 84166 PROTEIN E-PHORESIS/URINE/CSF: CPT

## 2024-06-07 PROCEDURE — 86235 NUCLEAR ANTIGEN ANTIBODY: CPT

## 2024-06-08 LAB — ENA SCL70 AB SER-ACNC: 0.5 AI (ref 0–0.9)

## 2024-06-09 DIAGNOSIS — G43.009 MIGRAINE WITHOUT AURA AND WITHOUT STATUS MIGRAINOSUS, NOT INTRACTABLE: Primary | ICD-10-CM

## 2024-06-09 DIAGNOSIS — R11.0 NAUSEA: ICD-10-CM

## 2024-06-10 LAB
ALBUMIN SERPL ELPH-MCNC: 3.95 G/DL (ref 3.2–5.1)
ALBUMIN SERPL ELPH-MCNC: 61.7 % (ref 48–70)
ALBUMIN UR ELPH-MCNC: 100 %
ALPHA1 GLOB MFR UR ELPH: 0 %
ALPHA1 GLOB SERPL ELPH-MCNC: 0.29 G/DL (ref 0.15–0.47)
ALPHA1 GLOB SERPL ELPH-MCNC: 4.6 % (ref 1.8–7)
ALPHA2 GLOB MFR UR ELPH: 0 %
ALPHA2 GLOB SERPL ELPH-MCNC: 0.63 G/DL (ref 0.42–1.04)
ALPHA2 GLOB SERPL ELPH-MCNC: 9.8 % (ref 5.9–14.9)
B-GLOBULIN MFR UR ELPH: 0 %
BETA GLOB ABNORMAL SERPL ELPH-MCNC: 0.4 G/DL (ref 0.31–0.57)
BETA1 GLOB SERPL ELPH-MCNC: 6.3 % (ref 4.7–7.7)
BETA2 GLOB SERPL ELPH-MCNC: 5 % (ref 3.1–7.9)
BETA2+GAMMA GLOB SERPL ELPH-MCNC: 0.32 G/DL (ref 0.2–0.58)
GAMMA GLOB ABNORMAL SERPL ELPH-MCNC: 0.81 G/DL (ref 0.4–1.66)
GAMMA GLOB MFR UR ELPH: 0 %
GAMMA GLOB SERPL ELPH-MCNC: 12.6 % (ref 6.9–22.3)
IGG/ALB SER: 1.61 {RATIO} (ref 1.1–1.8)
PROT PATTERN SERPL ELPH-IMP: NORMAL
PROT PATTERN UR ELPH-IMP: NORMAL
PROT SERPL-MCNC: 6.4 G/DL (ref 6.4–8.2)
PROT UR-MCNC: 4 MG/DL

## 2024-06-10 PROCEDURE — 84165 PROTEIN E-PHORESIS SERUM: CPT | Performed by: PATHOLOGY

## 2024-06-10 PROCEDURE — 84166 PROTEIN E-PHORESIS/URINE/CSF: CPT | Performed by: PATHOLOGY

## 2024-06-11 LAB
CARDIOLIPIN IGA SER IA-ACNC: 4.2
CARDIOLIPIN IGG SER IA-ACNC: 2
CARDIOLIPIN IGM SER IA-ACNC: 1.7
CCP AB SER IA-ACNC: 1.6

## 2024-06-14 LAB — U1 SNRNP AB SER QL: 123 UNITS

## 2024-06-18 ENCOUNTER — CONSULT (OUTPATIENT)
Age: 64
End: 2024-06-18
Payer: COMMERCIAL

## 2024-06-18 VITALS
OXYGEN SATURATION: 97 % | WEIGHT: 164.4 LBS | HEIGHT: 65 IN | DIASTOLIC BLOOD PRESSURE: 72 MMHG | TEMPERATURE: 97.1 F | SYSTOLIC BLOOD PRESSURE: 134 MMHG | HEART RATE: 93 BPM | BODY MASS INDEX: 27.39 KG/M2

## 2024-06-18 DIAGNOSIS — R52 GENERALIZED BODY ACHES: ICD-10-CM

## 2024-06-18 DIAGNOSIS — H91.93 BILATERAL HEARING LOSS, UNSPECIFIED HEARING LOSS TYPE: ICD-10-CM

## 2024-06-18 DIAGNOSIS — I73.00 RAYNAUD'S PHENOMENON: ICD-10-CM

## 2024-06-18 DIAGNOSIS — H66.90 SUBACUTE OTITIS MEDIA, UNSPECIFIED OTITIS MEDIA TYPE: ICD-10-CM

## 2024-06-18 DIAGNOSIS — M79.10 MYALGIA: ICD-10-CM

## 2024-06-18 DIAGNOSIS — M35.9 UNDIFFERENTIATED CONNECTIVE TISSUE DISEASE (HCC): Primary | ICD-10-CM

## 2024-06-18 DIAGNOSIS — L23.9 CUTANEOUS HYPERSENSITIVITY: ICD-10-CM

## 2024-06-18 DIAGNOSIS — M35.00 SJOGREN'S SYNDROME WITHOUT EXTRAGLANDULAR INVOLVEMENT (HCC): ICD-10-CM

## 2024-06-18 DIAGNOSIS — H70.93 MASTOIDITIS OF BOTH SIDES: ICD-10-CM

## 2024-06-18 PROCEDURE — 99245 OFF/OP CONSLTJ NEW/EST HI 55: CPT | Performed by: INTERNAL MEDICINE

## 2024-06-18 RX ORDER — SODIUM FLUORIDE 5 MG/ML
PASTE, DENTIFRICE DENTAL
Qty: 112 G | Refills: 5 | Status: SHIPPED | OUTPATIENT
Start: 2024-06-18

## 2024-06-18 RX ORDER — BUPROPION HYDROCHLORIDE 150 MG/1
150 TABLET, EXTENDED RELEASE ORAL DAILY
COMMUNITY

## 2024-06-18 NOTE — PROGRESS NOTES
Assessment and Plan:     1. Undifferentiated connective tissue disease (HCC)  -     MISCELLANEOUS LAB TEST; Future; Expected date: 06/19/2024  -     C-reactive protein; Future  -     CBC and differential; Future  -     Comprehensive metabolic panel; Future  -     C3 complement; Future  -     C4 complement; Future  -     Urinalysis with microscopic  -     MyoMarker 3 Plus Profile (RDL); Future  -     ANCA Profile (RDL); Future  -     Scleroderma Comp Plus (RDL); Future  2. Myalgia  -     Ambulatory Referral to Rheumatology  3. Cutaneous hypersensitivity  -     Ambulatory Referral to Rheumatology  4. Generalized body aches  -     Ambulatory Referral to Rheumatology  5. Raynaud's phenomenon  -     Ambulatory Referral to Rheumatology  6. Bilateral hearing loss, unspecified hearing loss type  7. Mastoiditis of both sides  8. Subacute otitis media, unspecified otitis media type  9. Sjogren's syndrome without extraglandular involvement (HCC)  -     SODIUM FLUORIDE, DENTAL GEL, 1.1 % CREA; Use on teeth nightly. Do not rinse.      Assessment & Plan  Patient with undifferentiated connective tissue disease with positive serologies with positive JESSICA, anti-Smith/RNP antibodies, anti-RNP antibody, and U1 RNP antibody, with history of Raynaud's phenomenon and sicca symptoms with probable Sjogren's syndrome primary versus secondary.  Rule out developing connective tissue disease.  Mild synovitis hands wrists, rule out inflammatory arthritis as part of connective tissue disease syndrome or reactive arthritis.  History of bilateral ear infections with residual hearing loss and tinnitus, with probable mastoiditis, right greater than left.  Rule out autoimmune related hearing loss.  Questionable viral syndrome prior to hospitalization with transient rash, body aches and chills.  Hypothyroidism with suppressed TSH off Synthroid.  History of obstructive sleep apnea intolerant to CPAP.  History of depression treated.  History of migraine  headaches with recent hospital admission for intractable headache.    Orders Placed This Encounter   Procedures    MISCELLANEOUS LAB TEST    C-reactive protein    CBC and differential    Comprehensive metabolic panel    C3 complement    C4 complement    Urinalysis with microscopic    MyoMarker 3 Plus Profile (RDL)    ANCA Profile (RDL)    Scleroderma Comp Plus (RDL)        Follow-up plan: Long discussion with patient regarding diagnostic and therapeutic options.  Will refer for laboratory evaluation to include repeat serologies, lupus Avise diagnostic connective tissue disease testing with multiple autoantibodies, ANCA panel, myositis specific antibody panel, and scleroderma panel.  I have suggested evaluation with Dr. Joesph Cates, periodontist to evaluate the status of her gums and teeth in view of her sicca symptoms.  Will start PreviDent toothpaste for nighttime use.  Will also start xylitol mints for xerostomia.  Have suggested preservative-free Systane for dry eyes with evaluation with ophthalmologist to see if she is a candidate for a prescription medication.  I have suggested the anti-inflammatory diet in view of potential benefit and gave her a book recommendation for this.  Will be in touch with the patient once results of workup available for review, for consideration of medication including steroids and Plaquenil.  Plan follow-up in 3 months or sooner if needed.  Further diagnostic and or therapeutic options will be entertained at that time.  Discussed in detail with patient.  Patient reassured.      The patient is a 64-year-old female who presents for a consult requested by primary care with a history of generalized body aches, Raynaud's, and myalgias.    The patient's symptoms commenced a week prior to her hospital admission on 06/02/2024 for a severe intractable migraine associated with nausea and vomiting, photophobia, and low-grade temperature. Prior to this, she experienced unusual body aches,  particularly in her arms, shoulders, and hips, which were alleviated by Advil. However, later that week, the pain escalated, prompting her to take oxycodone at home. She was treated with Nurtec while hospitalized for her migraine. She was found to have positive JESSICA prompting her spouse, her primary care physician, to recommend a rheumatology consultation. Her migraines are typically without an aura, and she has not experienced severe migraines since her menopause. However, she experiences headaches 3 to 4 times a month, which are less debilitating, controlled with medication. She was seen by neurology during her hospital stay, who recommended continuation of oral Imitrex for migraines.  In early May, she returned from a trip to [place], during which time she had a transient rash with bodyaches and chills, was ultimately diagnosed with bilateral otitis media and treated with amoxicillin without benefit.  She subsequently was given Ceftin which did improve her symptoms, with the exception of hearing loss which has persisted. During her hospital stay, she was diagnosed with chronic right mastoiditis, seen on CT of the facial bones.  CT of the head was significant for right greater than left mastoid effusions, with near total opacification of the right mastoid air cells.  She was noted to have mildly elevated LFTs and thrombocytopenia at admission, and she was empirically treated with doxycycline due to concern for tickborne illness during her hospital stay.  She had been treated with Synthroid for hypothyroidism, however, TSH was significantly suppressed, and Synthroid was discontinued.  She was also noted to have hyponatremia felt to be secondary to Wellbutrin.  She has significant swelling in her hands and feet while in the hospital, which subsequently has resolved.  She denies frequent oral sores, hair loss, bald spots, chest pain, or shortness of breath. She experiences dry eyes and more recently dry mouth, for  which she uses artificial tears and drinks a lot of water and occasionally uses throat lozenges. Over the past 2 to 3 years, she has experienced cavities and root canals. She denies any history of seizures or documented tick bites. She has chronic constipation, for which she takes magnesium.  She had 1 episode of urinary incontinence while vomiting.  Her sleep is satisfactory.    The patient has been diagnosed with Raynaud's for at least 5 years, with no history of digital ulcerations. She uses cold protection and gloves for Raynaud's.     Of note is that a few years ago she had temporary hearing loss and was treated with steroids with resolution.  A few years later she had a similar episode.  She currently has tinnitus with her hearing loss.  She has a history of obstructive sleep apnea but had been intolerant to the mask and is using an oral appliance, however, it exacerbates jaw pain.  She does have a history of depression treated with Wellbutrin.  She had 1 episode of alopecia areata in September 2022 which resolved with clobetasol.  She believes vitamin D has helped as well.  She also had periorbital edema after a COVID-vaccine.  · She is a retired RN.        History of Present Illness      Results  Laboratory Studies  Abnormal liver functions and slight decrease in platelet count. Sodium is low, potentially due to Wellbutrin. TSH was low. C-reactive protein was high.       Review of Systems  Review of Systems   Constitutional:  Positive for fatigue and fever. Negative for chills.   HENT:  Positive for hearing loss and tinnitus. Negative for ear pain and sore throat.         Dry mouth   Eyes:  Negative for pain and visual disturbance.        Dry eyes   Respiratory:  Negative for cough and shortness of breath.    Cardiovascular:  Negative for chest pain and palpitations.   Gastrointestinal:  Positive for constipation. Negative for abdominal pain and vomiting.   Genitourinary:  Negative for dysuria and hematuria.    Musculoskeletal:  Positive for arthralgias, back pain, joint swelling and myalgias.   Skin:  Positive for color change. Negative for rash.        Raynaud's   Neurological:  Positive for headaches. Negative for seizures and syncope.   All other systems reviewed and are negative.      Allergies  Allergies   Allergen Reactions    Pfizer Covid-19 Vac-Hugo 5-11y [Covid-19 Mrna Vacc (Moderna)] Rash     Pfizer- swollen eyes and body rash    Other        Home Medications    Current Outpatient Medications:     buPROPion (WELLBUTRIN SR) 150 mg 12 hr tablet, Take 150 mg by mouth in the morning, Disp: , Rfl:     buPROPion (WELLBUTRIN XL) 300 mg 24 hr tablet, Take 1 tablet (300 mg total) by mouth every morning, Disp: 90 tablet, Rfl: 0    cyclobenzaprine (FLEXERIL) 10 mg tablet, Take 1 tablet (10 mg total) by mouth 3 (three) times a day as needed for muscle spasms, Disp: 60 tablet, Rfl: 1    fluticasone (FLONASE) 50 mcg/act nasal spray, 2 sprays into each nostril daily, Disp: 16 g, Rfl: 5    LORazepam (Ativan) 0.5 mg tablet, Take 1 tablet (0.5 mg total) by mouth every 8 (eight) hours as needed for anxiety, Disp: 90 tablet, Rfl: 1    nortriptyline (PAMELOR) 10 mg capsule, Take 10 mg by mouth daily at bedtime, Disp: , Rfl:     Progesterone 100 MG CAPS, Take 100 mg by mouth at bedtime, Disp: 90 capsule, Rfl: 2    SODIUM FLUORIDE, DENTAL GEL, 1.1 % CREA, Use on teeth nightly. Do not rinse., Disp: 112 g, Rfl: 5    trimethobenzamide (TIGAN) 300 mg capsule, Take 1 capsule (300 mg total) by mouth 3 (three) times a day, Disp: 15 capsule, Rfl: 1    Trintellix 20 MG tablet, Take 20 mg by mouth in the morning, Disp: , Rfl:     Past Medical History  Past Medical History:   Diagnosis Date    Depression     Non-ulcer dyspepsia 11/25/2015    Periorbital edema of both eyes 05/27/2021    Sleep apnea, obstructive     Undifferentiated connective tissue disease (HCC)        Past Surgical History   Past Surgical History:   Procedure Laterality Date  "   HYSTERECTOMY  2010    has ovaries    US GUIDANCE BREAST BIOPSY LEFT EACH ADDITIONAL Left 6/12/2023    US GUIDED BREAST BIOPSY LEFT COMPLETE Left 6/12/2023       Family History  No known family history of autoimmune or inflammatory diseases.    Family History   Problem Relation Age of Onset    Hypertension Mother     Multiple myeloma Father     No Known Problems Sister     No Known Problems Daughter     No Known Problems Daughter     No Known Problems Maternal Grandmother     No Known Problems Maternal Grandfather     No Known Problems Paternal Grandmother     No Known Problems Paternal Grandfather        Social History  Occupation: retired nurse  Social History     Substance and Sexual Activity   Alcohol Use Never     Social History     Substance and Sexual Activity   Drug Use Never     Social History     Tobacco Use   Smoking Status Never   Smokeless Tobacco Never       Objective:    Vitals:    06/18/24 1237   BP: 134/72   BP Location: Left arm   Patient Position: Sitting   Cuff Size: Adult   Pulse: 93   Temp: (!) 97.1 °F (36.2 °C)   TempSrc: Temporal   SpO2: 97%   Weight: 74.6 kg (164 lb 6.4 oz)   Height: 5' 4.5\" (1.638 m)       Physical Exam    Physical Exam  Vitals reviewed.   Constitutional:       Appearance: Normal appearance.   HENT:      Head: Normocephalic.      Nose:      Comments: Nose and throat unremarkable.     Mouth/Throat:      Comments: Slightly prominent gums upper greater than lower.  Eyes:      Extraocular Movements: Extraocular movements intact.      Comments: Mild scleral injection bilaterally   Neck:      Comments: Without masses, thyromegaly, lymphadenopathy  Cardiovascular:      Rate and Rhythm: Regular rhythm.   Pulmonary:      Breath sounds: Normal breath sounds.   Abdominal:      Palpations: Abdomen is soft.   Musculoskeletal:      Cervical back: Neck supple.      Comments: Neck mildly decreased lateral flexion.  Shoulders full range of motion as do elbows.  Wrist soft tissue " prominence left greater than right with positive Tinel's right.  Hands squaring first carpometacarpal joints bilaterally with scattered Heberden's nodes.  Slight soft tissue prominence second and third MCPs bilaterally.  Very mild interossei wasting.  Straight leg raising negative bilaterally.  Schober's negative.  No SI joint tenderness appreciated.  Hips full range of motion.  Knees full range of motion with patellofemoral crepitus.  Ankles full range of motion.  Feet rearfoot hyperpronation with midfoot cavus deformities, and very mild hallux valgus deformities with hammertoe deformities scattered.  No synovitis appreciated.  Raynaud's toes with no digital ulcerations.   Skin:     General: Skin is warm.      Comments: Raynaud's toes with no digital ulcerations.  Fingers without periungual erythema.   Neurological:      General: No focal deficit present.         Imaging:   CT of the head dated 6/2/2024 significant for no acute intracranial abnormality, however, right greater than left mastoid effusions noted with near-total opacification of the right mastoid air cells.  CT of the facial bones dated 6/2/2024 significant for cervical spine degenerative changes, with right mastoid air cell opacification without coalescence.  Abnormal soft tissue around the right ossicles with no erosion noted.  Mild dental caries appreciated.  Normal symmetrical appearance of orbital contents seen.  Normal symmetrical appearance of the cavernous sinuses noted.  Chest x-ray dated 6/2/2024 significant for trace right effusion on the lateral projection, otherwise normal.  DEXA dated 3/7/2023 significant for lumbar spine T-score -2.9.  Left total hip T-score -1.4.  Left femoral neck T-score -2.0.  FRAX risk for hip fracture 2.4% major fracture risk 16%.    Labs:   Lab work dated 6/7/2024 significant for cardiolipin antibodies negative.  PTT normal at 30.  Scleroderma antibody negative.  U1 RNP antibody positive at 123.  Serum protein  electrophoresis with no monoclonal bands.  Urine protein electrophoresis with no monoclonal bands.  CCP negative.  Lab work dated 6/3/2024 significant for positive Rivera/RNP and RNP antibodies.  Rivera antibodies negative.  Sjogren's antibodies negative.  Double-stranded DNA antibodies negative.  JESSICA positive in a dilution of 320 in a speckled pattern.  CBC with white blood cell count 3.45 with hemoglobin/hematocrit 10.5/31.3 respectively.  Platelets 133.  Prior to that platelet count has been normal.  TSH low at 0.074 with free T40.53.  Procalcitonin 0.44 high.  Ehrlichia and Babesia as well as Anaplasma antibodies negative.  Flu, RSV, and COVID-negative.  Blood parasites negative.  West Nile antibodies negative.  Antibody negative.  Urinalysis 1+ protein.  1-2 RBCs and WBCs noted on microscopic.  Sodium 130.  AST 44 with normal less than 39.  ALT elevated at 58.  Normal 7-52.  Estimated GFR 76.  Calcium 8.9.  CK 32.  CRP 82.1. ESR 11.      Reviewed records, labs, and imaging with the patient in detail.  Counseled patient.  Discussion regarding my findings and recommendations.  Office visit with documentation 70 min.

## 2024-06-18 NOTE — PATIENT INSTRUCTIONS
Get the lab work ordered completed.  Start the special toothpaste called PreviDent and use it at night when your teeth.  Do not rinse.  Add the special mint that we are suggesting as well as Biotene spray for dry mouth.  I would suggest that you have a visit with Dr. Joesph Cates, local periodontist to look at the status of your gums in view of your dry mouth.  You likely will need to have cleanings every 3 to 4 months, but Dr. Cates will give you information regarding that. [phone]. I would suggest that you consider the anti-inflammatory diet.  We will give you a book reference for that.  Add the preservative free Systane eyedrops.  You should see the eye doctor to see if you would be a candidate for one of the prescription medications for dry eyes.

## 2024-06-19 PROBLEM — M35.9 UNDIFFERENTIATED CONNECTIVE TISSUE DISEASE (HCC): Status: ACTIVE | Noted: 2024-06-19

## 2024-06-19 PROBLEM — H70.93 MASTOIDITIS OF BOTH SIDES: Status: ACTIVE | Noted: 2024-06-19

## 2024-06-19 PROBLEM — H66.90 SUBACUTE OTITIS MEDIA: Status: ACTIVE | Noted: 2024-06-19

## 2024-06-19 PROBLEM — I73.00 RAYNAUD'S PHENOMENON: Status: ACTIVE | Noted: 2024-06-19

## 2024-06-19 PROBLEM — H91.93 BILATERAL HEARING LOSS: Status: ACTIVE | Noted: 2024-06-19

## 2024-06-19 PROBLEM — M35.00 SJOGREN'S SYNDROME WITHOUT EXTRAGLANDULAR INVOLVEMENT (HCC): Status: ACTIVE | Noted: 2024-06-19

## 2024-06-20 ENCOUNTER — LAB (OUTPATIENT)
Dept: LAB | Facility: AMBULARY SURGERY CENTER | Age: 64
End: 2024-06-20
Payer: COMMERCIAL

## 2024-06-20 DIAGNOSIS — M35.9 UNDIFFERENTIATED CONNECTIVE TISSUE DISEASE (HCC): ICD-10-CM

## 2024-06-20 LAB
ALBUMIN SERPL BCG-MCNC: 4.5 G/DL (ref 3.5–5)
ALP SERPL-CCNC: 72 U/L (ref 34–104)
ALT SERPL W P-5'-P-CCNC: 27 U/L (ref 7–52)
ANION GAP SERPL CALCULATED.3IONS-SCNC: 9 MMOL/L (ref 4–13)
AST SERPL W P-5'-P-CCNC: 24 U/L (ref 13–39)
BACTERIA UR QL AUTO: NORMAL /HPF
BASOPHILS # BLD AUTO: 0.05 THOUSANDS/ÂΜL (ref 0–0.1)
BASOPHILS NFR BLD AUTO: 2 % (ref 0–1)
BILIRUB SERPL-MCNC: 0.76 MG/DL (ref 0.2–1)
BILIRUB UR QL STRIP: NEGATIVE
BUN SERPL-MCNC: 19 MG/DL (ref 5–25)
C3 SERPL-MCNC: 146 MG/DL (ref 87–200)
C4 SERPL-MCNC: 33 MG/DL (ref 19–52)
CALCIUM SERPL-MCNC: 9.4 MG/DL (ref 8.4–10.2)
CHLORIDE SERPL-SCNC: 100 MMOL/L (ref 96–108)
CLARITY UR: CLEAR
CO2 SERPL-SCNC: 27 MMOL/L (ref 21–32)
COLOR UR: COLORLESS
CREAT SERPL-MCNC: 0.88 MG/DL (ref 0.6–1.3)
CRP SERPL QL: 3.4 MG/L
EOSINOPHIL # BLD AUTO: 0.19 THOUSAND/ÂΜL (ref 0–0.61)
EOSINOPHIL NFR BLD AUTO: 7 % (ref 0–6)
ERYTHROCYTE [DISTWIDTH] IN BLOOD BY AUTOMATED COUNT: 13.2 % (ref 11.6–15.1)
GFR SERPL CREATININE-BSD FRML MDRD: 69 ML/MIN/1.73SQ M
GLUCOSE P FAST SERPL-MCNC: 55 MG/DL (ref 65–99)
GLUCOSE UR STRIP-MCNC: NEGATIVE MG/DL
HCT VFR BLD AUTO: 38.1 % (ref 34.8–46.1)
HGB BLD-MCNC: 12.4 G/DL (ref 11.5–15.4)
HGB UR QL STRIP.AUTO: NEGATIVE
IMM GRANULOCYTES # BLD AUTO: 0.01 THOUSAND/UL (ref 0–0.2)
IMM GRANULOCYTES NFR BLD AUTO: 0 % (ref 0–2)
KETONES UR STRIP-MCNC: NEGATIVE MG/DL
LEUKOCYTE ESTERASE UR QL STRIP: NEGATIVE
LYMPHOCYTES # BLD AUTO: 1.03 THOUSANDS/ÂΜL (ref 0.6–4.47)
LYMPHOCYTES NFR BLD AUTO: 35 % (ref 14–44)
MCH RBC QN AUTO: 29.7 PG (ref 26.8–34.3)
MCHC RBC AUTO-ENTMCNC: 32.5 G/DL (ref 31.4–37.4)
MCV RBC AUTO: 91 FL (ref 82–98)
MONOCYTES # BLD AUTO: 0.27 THOUSAND/ÂΜL (ref 0.17–1.22)
MONOCYTES NFR BLD AUTO: 9 % (ref 4–12)
NEUTROPHILS # BLD AUTO: 1.36 THOUSANDS/ÂΜL (ref 1.85–7.62)
NEUTS SEG NFR BLD AUTO: 47 % (ref 43–75)
NITRITE UR QL STRIP: NEGATIVE
NON-SQ EPI CELLS URNS QL MICRO: NORMAL /HPF
NRBC BLD AUTO-RTO: 0 /100 WBCS
PH UR STRIP.AUTO: 6.5 [PH]
PLATELET # BLD AUTO: 209 THOUSANDS/UL (ref 149–390)
PMV BLD AUTO: 9.9 FL (ref 8.9–12.7)
POTASSIUM SERPL-SCNC: 3.9 MMOL/L (ref 3.5–5.3)
PROT SERPL-MCNC: 7.1 G/DL (ref 6.4–8.4)
PROT UR STRIP-MCNC: NEGATIVE MG/DL
RBC # BLD AUTO: 4.17 MILLION/UL (ref 3.81–5.12)
RBC #/AREA URNS AUTO: NORMAL /HPF
SODIUM SERPL-SCNC: 136 MMOL/L (ref 135–147)
SP GR UR STRIP.AUTO: 1.01 (ref 1–1.03)
UROBILINOGEN UR STRIP-ACNC: <2 MG/DL
WBC # BLD AUTO: 2.91 THOUSAND/UL (ref 4.31–10.16)
WBC #/AREA URNS AUTO: NORMAL /HPF

## 2024-06-20 PROCEDURE — 86160 COMPLEMENT ANTIGEN: CPT

## 2024-06-20 PROCEDURE — 80053 COMPREHEN METABOLIC PANEL: CPT

## 2024-06-20 PROCEDURE — 86037 ANCA TITER EACH ANTIBODY: CPT

## 2024-06-20 PROCEDURE — 86235 NUCLEAR ANTIGEN ANTIBODY: CPT

## 2024-06-20 PROCEDURE — 83516 IMMUNOASSAY NONANTIBODY: CPT

## 2024-06-20 PROCEDURE — 36415 COLL VENOUS BLD VENIPUNCTURE: CPT

## 2024-06-20 PROCEDURE — 81001 URINALYSIS AUTO W/SCOPE: CPT | Performed by: INTERNAL MEDICINE

## 2024-06-20 PROCEDURE — 86140 C-REACTIVE PROTEIN: CPT

## 2024-06-20 PROCEDURE — 83520 IMMUNOASSAY QUANT NOS NONAB: CPT

## 2024-06-20 PROCEDURE — 85025 COMPLETE CBC W/AUTO DIFF WBC: CPT

## 2024-06-21 DIAGNOSIS — N18.2 STAGE 2 CHRONIC KIDNEY DISEASE: Primary | ICD-10-CM

## 2024-06-21 LAB — ENA SCL70 AB SER-ACNC: 0.5 AI (ref 0–0.9)

## 2024-06-21 NOTE — RESULT ENCOUNTER NOTE
Josephine,  Your glucose is a bit low at 55.  Your kidney function is also lower than on prior studies.  I put in your chart for you to repeat a BMP to recheck.  Your inflammation factor has improved considerably.  Your blood count has improved however you still have persistent low white blood cell count.  Platelet count has improved as well.  Will be in touch when the rest of the lab work is available for review.  Kierra Gil

## 2024-06-25 LAB
C-ANCA TITR SER IF: NORMAL TITER
MYELOPEROXIDASE AB SER IA-ACNC: <0.2 UNITS (ref 0–0.9)
P-ANCA ATYPICAL TITR SER IF: NORMAL TITER
P-ANCA TITR SER IF: NORMAL TITER
PROTEINASE3 AB SER IA-ACNC: <0.2 UNITS (ref 0–0.9)

## 2024-07-02 ENCOUNTER — TELEPHONE (OUTPATIENT)
Age: 64
End: 2024-07-02

## 2024-07-02 NOTE — TELEPHONE ENCOUNTER
Karen from [employer] calling in to find out insurance information for patients.    Insurance card need copy of front and back.     Fax to [phone]

## 2024-07-06 LAB
EJ AB SER QL: NEGATIVE
ENA JO1 AB SER IA-ACNC: <20 UNITS
ENA PM/SCL AB SER-ACNC: <20 UNITS
ENA SS-A 52KD IGG SER IA-ACNC: <20 UNITS
FIBRILLARIN AB SER QL: NEGATIVE
KU AB SER QL: NEGATIVE
MDA5 AB SER LINE BLOT-ACNC: 20 UNITS
MI2 AB SER QL: NEGATIVE
MJ AB SER LINE BLOT-ACNC: <20 UNITS
OJ AB SER QL: NEGATIVE
PL12 AB SER QL: NEGATIVE
PL7 AB SER QL: NEGATIVE
SAE1 IGG SER QL LINE BLOT: <20 UNITS
SRP AB SERPL QL: NEGATIVE
TIF1-GAMMA AB SER LINE BLOT-ACNC: <20 UNITS
U1 SNRNP AB SER IA-ACNC: 116 UNITS
U2 SNRNP AB SER QL: ABNORMAL

## 2024-07-07 DIAGNOSIS — M35.9 UNDIFFERENTIATED CONNECTIVE TISSUE DISEASE (HCC): Primary | ICD-10-CM

## 2024-07-07 NOTE — RESULT ENCOUNTER NOTE
Josephine,  I tried to reach you and your  today to discuss some of your labs.  I left my cell phone number with Hayden, and I asked him to give me a call back.  I did put in an order to repeat one of the labs.  No urgency in getting the lab but you should try and get it in the next week or so.  Thank you.  Will speak in more detail when I connect with you by phone. Thank you.  Kierra Gil

## 2024-07-08 DIAGNOSIS — N95.1 MENOPAUSAL SYMPTOMS: ICD-10-CM

## 2024-07-12 RX ORDER — PROGESTERONE 100 MG/1
1 CAPSULE ORAL
Qty: 90 CAPSULE | Refills: 3 | Status: SHIPPED | OUTPATIENT
Start: 2024-07-12

## 2024-07-16 DIAGNOSIS — N95.1 MENOPAUSAL SYMPTOMS: Primary | ICD-10-CM

## 2024-07-16 RX ORDER — ESTRADIOL 0.05 MG/D
1 PATCH TRANSDERMAL WEEKLY
Qty: 12 PATCH | Refills: 4 | Status: SHIPPED | OUTPATIENT
Start: 2024-07-16

## 2024-07-22 DIAGNOSIS — G43.011 INTRACTABLE MIGRAINE WITHOUT AURA AND WITH STATUS MIGRAINOSUS: ICD-10-CM

## 2024-07-22 DIAGNOSIS — I73.00 RAYNAUD'S PHENOMENON WITHOUT GANGRENE: Primary | ICD-10-CM

## 2024-07-22 DIAGNOSIS — G43.009 MIGRAINE WITHOUT AURA AND WITHOUT STATUS MIGRAINOSUS, NOT INTRACTABLE: ICD-10-CM

## 2024-07-25 ENCOUNTER — TELEPHONE (OUTPATIENT)
Age: 64
End: 2024-07-25

## 2024-07-25 DIAGNOSIS — G43.009 MIGRAINE WITHOUT AURA AND WITHOUT STATUS MIGRAINOSUS, NOT INTRACTABLE: ICD-10-CM

## 2024-07-25 RX ORDER — SUMATRIPTAN 100 MG/1
100 TABLET, FILM COATED ORAL 2 TIMES DAILY PRN
Qty: 27 TABLET | Refills: 3 | Status: SHIPPED | OUTPATIENT
Start: 2024-07-25 | End: 2024-07-25

## 2024-07-25 RX ORDER — SUMATRIPTAN 100 MG/1
100 TABLET, FILM COATED ORAL 2 TIMES DAILY PRN
Qty: 27 TABLET | Refills: 3 | Status: SHIPPED | OUTPATIENT
Start: 2024-07-25 | End: 2024-08-24

## 2024-07-25 NOTE — TELEPHONE ENCOUNTER
Unable to start prior authorization for NURTEC 75mg without office visit notes. Please advise. Thank you.

## 2024-08-01 ENCOUNTER — TELEPHONE (OUTPATIENT)
Age: 64
End: 2024-08-01

## 2024-08-01 NOTE — TELEPHONE ENCOUNTER
1ST ATTEMPT,     Called pt no answer, LMOM.    Thank you,     Jo GARZON / JERONIMO ROUSE/ Intractable headache     DC- HOME- 6/2/2024    Josephine Premaroberto carlos will need follow up in 4- 6 weeks with headache attending or AP.  She will not require outpatient neurological testing.

## 2024-08-07 DIAGNOSIS — F33.2 MDD (MAJOR DEPRESSIVE DISORDER), RECURRENT SEVERE, WITHOUT PSYCHOSIS (HCC): Primary | ICD-10-CM

## 2024-08-07 DIAGNOSIS — F41.8 DEPRESSION WITH ANXIETY: ICD-10-CM

## 2024-08-07 RX ORDER — NORTRIPTYLINE HYDROCHLORIDE 10 MG/1
10 CAPSULE ORAL
Qty: 90 CAPSULE | Refills: 3 | Status: SHIPPED | OUTPATIENT
Start: 2024-08-07

## 2024-08-08 ENCOUNTER — APPOINTMENT (OUTPATIENT)
Dept: LAB | Facility: AMBULARY SURGERY CENTER | Age: 64
End: 2024-08-08
Payer: COMMERCIAL

## 2024-08-08 DIAGNOSIS — N18.2 STAGE 2 CHRONIC KIDNEY DISEASE: ICD-10-CM

## 2024-08-08 DIAGNOSIS — M35.9 UNDIFFERENTIATED CONNECTIVE TISSUE DISEASE (HCC): ICD-10-CM

## 2024-08-08 DIAGNOSIS — Z00.8 HEALTH EXAMINATION IN POPULATION SURVEYS: ICD-10-CM

## 2024-08-08 LAB
ANION GAP SERPL CALCULATED.3IONS-SCNC: 8 MMOL/L (ref 4–13)
BUN SERPL-MCNC: 28 MG/DL (ref 5–25)
CALCIUM SERPL-MCNC: 9 MG/DL (ref 8.4–10.2)
CHLORIDE SERPL-SCNC: 102 MMOL/L (ref 96–108)
CHOLEST SERPL-MCNC: 202 MG/DL
CO2 SERPL-SCNC: 25 MMOL/L (ref 21–32)
CREAT SERPL-MCNC: 0.9 MG/DL (ref 0.6–1.3)
EST. AVERAGE GLUCOSE BLD GHB EST-MCNC: 114 MG/DL
GFR SERPL CREATININE-BSD FRML MDRD: 67 ML/MIN/1.73SQ M
GLUCOSE SERPL-MCNC: 54 MG/DL (ref 65–140)
HBA1C MFR BLD: 5.6 %
HDLC SERPL-MCNC: 48 MG/DL
LDLC SERPL CALC-MCNC: 93 MG/DL (ref 0–100)
NONHDLC SERPL-MCNC: 154 MG/DL
POTASSIUM SERPL-SCNC: 4.2 MMOL/L (ref 3.5–5.3)
SODIUM SERPL-SCNC: 135 MMOL/L (ref 135–147)
TRIGL SERPL-MCNC: 304 MG/DL

## 2024-08-08 PROCEDURE — 80048 BASIC METABOLIC PNL TOTAL CA: CPT

## 2024-08-08 PROCEDURE — 83516 IMMUNOASSAY NONANTIBODY: CPT

## 2024-08-08 PROCEDURE — 36415 COLL VENOUS BLD VENIPUNCTURE: CPT

## 2024-08-08 PROCEDURE — 80061 LIPID PANEL: CPT

## 2024-08-08 PROCEDURE — 83036 HEMOGLOBIN GLYCOSYLATED A1C: CPT

## 2024-08-16 LAB — MDA5 AB SER LINE BLOT-ACNC: <20 UNITS

## 2024-08-23 DIAGNOSIS — G43.009 MIGRAINE WITHOUT AURA AND WITHOUT STATUS MIGRAINOSUS, NOT INTRACTABLE: ICD-10-CM

## 2024-08-23 RX ORDER — SUMATRIPTAN 100 MG/1
100 TABLET, FILM COATED ORAL 2 TIMES DAILY PRN
Qty: 54 TABLET | Refills: 1 | Status: SHIPPED | OUTPATIENT
Start: 2024-08-23 | End: 2024-11-21

## 2024-09-10 ENCOUNTER — OFFICE VISIT (OUTPATIENT)
Age: 64
End: 2024-09-10
Payer: COMMERCIAL

## 2024-09-10 VITALS
WEIGHT: 167 LBS | HEIGHT: 64 IN | TEMPERATURE: 97.3 F | BODY MASS INDEX: 28.51 KG/M2 | OXYGEN SATURATION: 100 % | DIASTOLIC BLOOD PRESSURE: 72 MMHG | HEART RATE: 83 BPM | SYSTOLIC BLOOD PRESSURE: 126 MMHG

## 2024-09-10 DIAGNOSIS — I73.00 RAYNAUD'S PHENOMENON WITHOUT GANGRENE: ICD-10-CM

## 2024-09-10 DIAGNOSIS — M77.01 MEDIAL EPICONDYLITIS OF ELBOW, RIGHT: ICD-10-CM

## 2024-09-10 DIAGNOSIS — N18.2 STAGE 2 CHRONIC KIDNEY DISEASE: ICD-10-CM

## 2024-09-10 DIAGNOSIS — M35.00 SJOGREN'S SYNDROME WITHOUT EXTRAGLANDULAR INVOLVEMENT (HCC): ICD-10-CM

## 2024-09-10 DIAGNOSIS — M35.9 UNDIFFERENTIATED CONNECTIVE TISSUE DISEASE (HCC): Primary | ICD-10-CM

## 2024-09-10 DIAGNOSIS — H91.93 BILATERAL HEARING LOSS, UNSPECIFIED HEARING LOSS TYPE: ICD-10-CM

## 2024-09-10 PROCEDURE — 99214 OFFICE O/P EST MOD 30 MIN: CPT | Performed by: INTERNAL MEDICINE

## 2024-09-10 RX ORDER — ONDANSETRON 8 MG/1
TABLET, FILM COATED ORAL
COMMUNITY
Start: 2024-06-09 | End: 2024-11-21 | Stop reason: ALTCHOICE

## 2024-09-10 NOTE — PATIENT INSTRUCTIONS
I would like to repeat the lupus Avise kit, however, you can do it after the holidays.  I think you should see the eye doctor so that you get a prescription for your dry eyes.  We will continue to monitor you for connective tissue disease, however, there is nothing that needs to be treated.  I gave you a prescription for occupational therapy for the right elbow epicondylitis.  If that does not help, either myself or one of the hand surgeons can give you an injection.  Just let us know.  Talk to Hayden about your elevated triglyceride.  It is 304.  Make sure you use cold protection with the cooler months for your hands and feet.

## 2024-09-10 NOTE — PROGRESS NOTES
Assessment/Plan:    Undifferentiated connective tissue disease (HCC)  Raynaud's more prominent in her toes with no digital ulcerations.  History sicca symptoms being treated symptomatically with Systane eyedrops.  She does have scleral injection despite Systane eyedrops.  Have suggested consultation with ophthalmology for consideration for prescription eyedrops.  Lupus Avise with borderline positive index, likely due to strongly positive JESSICA with no evidence for complement activation or double-stranded DNA antibodies.  All of her joint symptoms and myalgias have resolved.  Suspect postviral.  Her MyoMarker study did show a positive anti-MDA 5, with repeat negative.  Will have the patient repeat the lupus Avise after the holiday season to monitor.  There is no significant symptomatology to suggest developing, however, we will continue to follow the patient closely clinically and with lab work as ordered.  I told her to use cold protection for her Raynaud's.  No need for calcium channel blockers at this time.  Follow-up 6 months or sooner if needed.    Sjogren's syndrome without extraglandular involvement (HCC)  Sicca symptoms with more prominent dry eyes with scleral injection.  She has been using Systane.  I did suggest that she see the eye doctor for consideration of prescription eyedrops.  Rule out developing Sjogren's syndrome primary versus secondary.  Lupus Avise with borderline positive index likely due to strongly positive JESSICA with negative complement activation and negative Rivera antibody.  Sjogren's antibodies negative.  Encourage vigilant dental hygiene and follow-up with dentist every 6 months or sooner if needed.  Xerostomia is less prominent than dry eyes.  If this persists would consider PreviDent toothpaste and XyliMelts or Biotene products.  Continue to monitor.  He    Raynaud's phenomenon  Raynaud's more prominent in her toes with no digital ulcerations.  Suggest cold protection which we discussed.   No need for calcium channel blockers at this time.  No signs of digital ulcerations.  Will continue to monitor.  Clinically with lab work as ordered.  She is to have repeat lupus Avise in view of strongly positive JESSICA.  Continue to monitor.    Medial epicondylitis of elbow, right  Subacute medial elbow epicondylitis.  Referred to occupational therapy.  If symptoms persist patient will call and come in for an injection where she could consider seeing an orthopedic hand surgeon as well.  Continue to monitor.    Bilateral hearing loss  Patient feels that hearing loss has improved following resolution of mastoiditis and otitis media.  Follow-up ENT.  Continue to monitor.    Stage 2 chronic kidney disease  Lab Results   Component Value Date    EGFR 58 12/07/2024    EGFR 67 08/08/2024    EGFR 69 06/20/2024    CREATININE 1.02 12/07/2024    CREATININE 0.90 08/08/2024    CREATININE 0.88 06/20/2024   Chronic kidney disease generally stage II.  Will try and avoid nephrotoxic agents like nonsteroidals.  Continue to monitor.  Follow-up primary care.         Problem List Items Addressed This Visit     Raynaud's phenomenon    Raynaud's more prominent in her toes with no digital ulcerations.  Suggest cold protection which we discussed.  No need for calcium channel blockers at this time.  No signs of digital ulcerations.  Will continue to monitor.  Clinically with lab work as ordered.  She is to have repeat lupus Avise in view of strongly positive JESSICA.  Continue to monitor.         Undifferentiated connective tissue disease (HCC) - Primary    Raynaud's more prominent in her toes with no digital ulcerations.  History sicca symptoms being treated symptomatically with Systane eyedrops.  She does have scleral injection despite Systane eyedrops.  Have suggested consultation with ophthalmology for consideration for prescription eyedrops.  Lupus Avise with borderline positive index, likely due to strongly positive JESSICA with no evidence for  complement activation or double-stranded DNA antibodies.  All of her joint symptoms and myalgias have resolved.  Suspect postviral.  Her MyoMarker study did show a positive anti-MDA 5, with repeat negative.  Will have the patient repeat the lupus Avise after the holiday season to monitor.  There is no significant symptomatology to suggest developing, however, we will continue to follow the patient closely clinically and with lab work as ordered.  I told her to use cold protection for her Raynaud's.  No need for calcium channel blockers at this time.  Follow-up 6 months or sooner if needed.         Relevant Orders    MISCELLANEOUS LAB TEST    Bilateral hearing loss    Patient feels that hearing loss has improved following resolution of mastoiditis and otitis media.  Follow-up ENT.  Continue to monitor.         Sjogren's syndrome without extraglandular involvement (HCC)    Sicca symptoms with more prominent dry eyes with scleral injection.  She has been using Systane.  I did suggest that she see the eye doctor for consideration of prescription eyedrops.  Rule out developing Sjogren's syndrome primary versus secondary.  Lupus Avise with borderline positive index likely due to strongly positive JESSICA with negative complement activation and negative Rivera antibody.  Sjogren's antibodies negative.  Encourage vigilant dental hygiene and follow-up with dentist every 6 months or sooner if needed.  Xerostomia is less prominent than dry eyes.  If this persists would consider PreviDent toothpaste and XyliMelts or Biotene products.  Continue to monitor.  He         Stage 2 chronic kidney disease    Lab Results   Component Value Date    EGFR 58 12/07/2024    EGFR 67 08/08/2024    EGFR 69 06/20/2024    CREATININE 1.02 12/07/2024    CREATININE 0.90 08/08/2024    CREATININE 0.88 06/20/2024   Chronic kidney disease generally stage II.  Will try and avoid nephrotoxic agents like nonsteroidals.  Continue to monitor.  Follow-up primary  care.         Medial epicondylitis of elbow, right    Subacute medial elbow epicondylitis.  Referred to occupational therapy.  If symptoms persist patient will call and come in for an injection where she could consider seeing an orthopedic hand surgeon as well.  Continue to monitor.         Relevant Orders    Ambulatory Referral to Occupational Therapy           Reviewed records, labs, and imaging with the patient in detail.  Counseled patient.  Discussion regarding my findings and recommendations.  Office visit with documentation 35 min.    Subjective:      Patient ID: Josephine Holt is a 64 y.o. female.    Patient with symptoms which started a week prior to her hospital admission on 06/02/2024 for a severe intractable migraine associated with nausea and vomiting, photophobia, and low-grade temperature. Prior to this, she experienced unusual body aches, particularly in her arms, shoulders, and hips, which were alleviated by Advil. However, later that week, the pain escalated, prompting her to take oxycodone at home. She was treated with Nurtec while hospitalized for her migraine. She was found to have positive JESSICA prompting her spouse, her primary care physician, to recommend a rheumatology consultation. Her migraines are typically without an aura, and she has not experienced severe migraines since her menopause. However, she experiences headaches 3 to 4 times a month, which are less debilitating, controlled with medication. She was seen by neurology during her hospital stay, who recommended continuation of oral Imitrex for migraines.  In early May, she returned from a trip to [place], during which time she had a transient rash with bodyaches and chills, was ultimately diagnosed with bilateral otitis media and treated with amoxicillin without benefit.  She subsequently was given Ceftin which did improve her symptoms, with the exception of hearing loss which has persisted. During her hospital stay, she was  diagnosed with chronic right mastoiditis, seen on CT of the facial bones.  CT of the head was significant for right greater than left mastoid effusions, with near total opacification of the right mastoid air cells.  She was noted to have mildly elevated LFTs and thrombocytopenia at admission, and she was empirically treated with doxycycline due to concern for tickborne illness during her hospital stay.  She had been treated with Synthroid for hypothyroidism, however, TSH was significantly suppressed, and Synthroid was discontinued.  She was also noted to have hyponatremia felt to be secondary to Wellbutrin.  She had significant swelling in her hands and feet while in the hospital, which subsequently has resolved.  She denies frequent oral sores, hair loss, bald spots, chest pain, or shortness of breath. She experiences dry eyes and more recently dry mouth, for which she uses artificial tears and drinks a lot of water and occasionally uses throat lozenges. Over the past 2 to 3 years, she has experienced cavities and root canals. She denies any history of seizures or documented tick bites. She has chronic constipation, for which she takes magnesium.  She had 1 episode of urinary incontinence while vomiting.  Her sleep is satisfactory.  She does note persistence of right elbow pain.    The patient has history of Raynaud's for at least 5 years, with no history of digital ulcerations. She uses cold protection and gloves for Raynaud's.     Of note is that a few years ago she had temporary hearing loss and was treated with steroids with resolution.  A few years later she had a similar episode.  She currently has tinnitus with her hearing loss.  She has a history of obstructive sleep apnea but had been intolerant to the mask and is using an oral appliance, however, it exacerbates jaw pain.  She does have a history of depression treated with Wellbutrin.  She had 1 episode of alopecia areata in September 2022 which resolved  with clobetasol.  She believes vitamin D has helped as well.  She also had periorbital edema after a COVID-vaccine.  · She is a retired RN.    Lab work dated 8/8/2024 significant for hemoglobin A1c 5.6.  Repeat Anti-MDA-5-Ab less than 20 negative.  BUN 28.  Creatinine 0.90 with estimated GFR 67.  Glucose 54.  Total cholesterol 202.  Triglyceride 304.  HDL 48.  LDL 93.  Review of lab work dated 6/20/2024 significant for CRP 3.4.  White blood cell count 2.91 with absolute neutrophil count 1.36.  Platelet count 209.  C3 and C4 complement negative.  Scleroderma antibodies negative.  Toan marker/myositis specific antibodies with positive MDA-5 Ab.  U1 RNP positive at 116.  U2 RNP weakly positive.  ANCA negative.  Lupus Avise with borderline positive index of +0.5.  JESSICA positive by IgG and hep 2 antibody JESSICA positive in a dilution of 1280 in a speckled pattern.  Double-stranded DNA antibodies and complement activation all negative.          Allergies  Allergies   Allergen Reactions   • Pfizer Covid-19 Vac-Hugo 5-11y [Covid-19 Mrna Vacc (Moderna)] Rash     Pfizer- swollen eyes and body rash   • Other        Home Medications    Current Outpatient Medications:   •  buPROPion (WELLBUTRIN SR) 150 mg 12 hr tablet, Take 150 mg by mouth in the morning, Disp: , Rfl:   •  buPROPion (WELLBUTRIN XL) 300 mg 24 hr tablet, Take 1 tablet (300 mg total) by mouth every morning, Disp: 90 tablet, Rfl: 0  •  cyclobenzaprine (FLEXERIL) 10 mg tablet, Take 1 tablet (10 mg total) by mouth 3 (three) times a day as needed for muscle spasms, Disp: 60 tablet, Rfl: 1  •  LORazepam (Ativan) 0.5 mg tablet, Take 1 tablet (0.5 mg total) by mouth every 8 (eight) hours as needed for anxiety, Disp: 90 tablet, Rfl: 1  •  nortriptyline (PAMELOR) 10 mg capsule, Take 1 capsule (10 mg total) by mouth daily at bedtime, Disp: 90 capsule, Rfl: 3  •  Progesterone 100 MG CAPS, TAKE ONE CAPSULE BY MOUTH AT BEDTIME, Disp: 90 capsule, Rfl: 3  •  rimegepant sulfate  (NURTEC) 75 mg TBDP, Take 1 tablet (75 mg total) by mouth daily as needed (Migraine headache), Disp: 45 tablet, Rfl: 0  •  SODIUM FLUORIDE, DENTAL GEL, 1.1 % CREA, Use on teeth nightly. Do not rinse., Disp: 112 g, Rfl: 5  •  SUMAtriptan (IMITREX) 100 mg tablet, Take 1 tablet (100 mg total) by mouth 2 (two) times a day as needed for migraine, Disp: 54 tablet, Rfl: 1  •  Trintellix 20 MG tablet, Take 20 mg by mouth in the morning, Disp: , Rfl:   •  estradiol (Climara) 0.05 mg/24 hr, Place 1 patch on the skin over 7 days once a week, Disp: 12 patch, Rfl: 3  •  predniSONE 10 mg tablet, 30 mg daily x 5 days; 20 mg daily x 5 days 10 mg daily x 5 days;, Disp: 60 tablet, Rfl: 1  •  trimethobenzamide (TIGAN) 300 mg capsule, Take 1 capsule (300 mg total) by mouth 3 (three) times a day, Disp: 15 capsule, Rfl: 1    Past Medical History  Past Medical History:   Diagnosis Date   • Depression    • Non-ulcer dyspepsia 11/25/2015   • Periorbital edema of both eyes 05/27/2021   • Sleep apnea, obstructive    • Undifferentiated connective tissue disease (HCC)        Past Surgical History   Past Surgical History:   Procedure Laterality Date   • HYSTERECTOMY  2010    has ovaries   • US GUIDANCE BREAST BIOPSY LEFT EACH ADDITIONAL Left 6/12/2023   • US GUIDED BREAST BIOPSY LEFT COMPLETE Left 6/12/2023       Family History   Family History   Problem Relation Age of Onset   • Hypertension Mother    • Multiple myeloma Father    • No Known Problems Sister    • No Known Problems Daughter    • No Known Problems Daughter    • No Known Problems Maternal Grandmother    • No Known Problems Maternal Grandfather    • No Known Problems Paternal Grandmother    • No Known Problems Paternal Grandfather        The following portions of the patient's history were reviewed and updated as appropriate: allergies, current medications, past family history, past medical history, past social history, past surgical history, and problem list.    Review of Systems  "  Constitutional:  Negative for chills.   HENT:  Positive for hearing loss (overall improved after ear infection treated, back to baseline hearing loss) and tinnitus (less prominent). Negative for ear pain and sore throat.         Dry mouth   Eyes:  Negative for pain and visual disturbance.        Dry eyes on Systane with ongoing sx's   Respiratory:  Negative for cough and shortness of breath.    Cardiovascular:  Negative for chest pain and palpitations.   Gastrointestinal:  Positive for constipation (better with magnesium). Negative for abdominal pain and vomiting.   Genitourinary:  Negative for dysuria and hematuria.   Musculoskeletal:  Positive for arthralgias.   Skin:  Positive for color change. Negative for rash.        Raynaud's   Neurological:  Positive for headaches. Negative for seizures and syncope.   All other systems reviewed and are negative.        Objective:      /72   Pulse 83   Temp (!) 97.3 °F (36.3 °C) (Temporal)   Ht 5' 4\" (1.626 m)   Wt 75.8 kg (167 lb)   SpO2 100%   BMI 28.67 kg/m²          Physical Exam  Vitals reviewed.   Constitutional:       Appearance: Normal appearance.   HENT:      Head: Normocephalic.      Nose:      Comments: Nose and throat unremarkable.     Mouth/Throat:      Comments: Slightly prominent gums upper greater than lower.  Eyes:      Extraocular Movements: Extraocular movements intact.      Comments: Mild scleral injection bilaterally   Neck:      Comments: Without masses, thyromegaly, lymphadenopathy  Cardiovascular:      Rate and Rhythm: Regular rhythm.   Pulmonary:      Breath sounds: Normal breath sounds.   Abdominal:      Palpations: Abdomen is soft.   Musculoskeletal:      Cervical back: Neck supple.      Comments: Neck mildly decreased lateral flexion.  Shoulders full range of motion as do elbows.  Exquisite tenderness is noted with palpation of the right elbow medial epicondyles.  Wrists full range of motion with no synovitis.  Positive Tinel's right " wrist.  Hands squaring first carpometacarpal joints bilaterally with scattered Heberden's nodes.  Straight leg raising negative bilaterally.  Schober's negative.  No SI joint tenderness appreciated.  Hips full range of motion.  Knees full range of motion with patellofemoral crepitus.  Ankles full range of motion.  Feet rearfoot hyperpronation with midfoot cavus deformities, and very mild hallux valgus deformities with hammertoe deformities scattered.  No synovitis appreciated.  Raynaud's toes with no digital ulcerations.   Skin:     General: Skin is warm.      Comments: Raynaud's toes with no digital ulcerations.  Fingers without periungual erythema.   Neurological:      General: No focal deficit present.               This note was written in part using the assistance of the [employer] [id number] microphone system. Those portions using this system have been dictated and not read.

## 2024-11-21 DIAGNOSIS — M35.9 UNDIFFERENTIATED CONNECTIVE TISSUE DISEASE (HCC): Primary | ICD-10-CM

## 2024-11-21 RX ORDER — PREDNISONE 10 MG/1
TABLET ORAL
Qty: 60 TABLET | Refills: 1 | Status: SHIPPED | OUTPATIENT
Start: 2024-11-21

## 2024-11-27 DIAGNOSIS — N95.1 MENOPAUSAL SYMPTOMS: ICD-10-CM

## 2024-11-27 RX ORDER — ESTRADIOL 0.05 MG/D
1 PATCH TRANSDERMAL WEEKLY
Qty: 12 PATCH | Refills: 3 | Status: SHIPPED | OUTPATIENT
Start: 2024-11-27 | End: 2024-12-08 | Stop reason: SDUPTHER

## 2024-11-27 NOTE — TELEPHONE ENCOUNTER
Patient called the RX Refill Line. Message is being forwarded to the office.     Patient has a question regarding the Estradiol 0.05 mg/24 hr. She said in the past she was prescribed 2 patches a week and the last 2 times it was prescribed for 1 patch a week. Patient does want clarification as to what she is supposed to be taking.     Please contact patient at [phone]

## 2024-12-07 ENCOUNTER — APPOINTMENT (OUTPATIENT)
Dept: LAB | Facility: MEDICAL CENTER | Age: 64
End: 2024-12-07
Payer: COMMERCIAL

## 2024-12-07 DIAGNOSIS — R53.82 CHRONIC FATIGUE, UNSPECIFIED: ICD-10-CM

## 2024-12-07 DIAGNOSIS — D70.9 NEUTROPENIA, UNSPECIFIED TYPE (HCC): ICD-10-CM

## 2024-12-07 DIAGNOSIS — E03.9 HYPOTHYROIDISM, UNSPECIFIED TYPE: Primary | ICD-10-CM

## 2024-12-07 DIAGNOSIS — E03.9 HYPOTHYROIDISM, UNSPECIFIED TYPE: ICD-10-CM

## 2024-12-07 DIAGNOSIS — L23.9 CUTANEOUS HYPERSENSITIVITY: ICD-10-CM

## 2024-12-07 LAB
ALBUMIN SERPL BCG-MCNC: 4.3 G/DL (ref 3.5–5)
ALP SERPL-CCNC: 72 U/L (ref 34–104)
ALT SERPL W P-5'-P-CCNC: 31 U/L (ref 7–52)
ANA SER QL IA: POSITIVE
ANION GAP SERPL CALCULATED.3IONS-SCNC: 8 MMOL/L (ref 4–13)
AST SERPL W P-5'-P-CCNC: 24 U/L (ref 13–39)
BASOPHILS # BLD AUTO: 0.04 THOUSANDS/ΜL (ref 0–0.1)
BASOPHILS NFR BLD AUTO: 1 % (ref 0–1)
BILIRUB SERPL-MCNC: 0.52 MG/DL (ref 0.2–1)
BUN SERPL-MCNC: 24 MG/DL (ref 5–25)
CALCIUM SERPL-MCNC: 9.6 MG/DL (ref 8.4–10.2)
CHLORIDE SERPL-SCNC: 100 MMOL/L (ref 96–108)
CO2 SERPL-SCNC: 28 MMOL/L (ref 21–32)
CREAT SERPL-MCNC: 1.02 MG/DL (ref 0.6–1.3)
CRP SERPL QL: 3.2 MG/L
EOSINOPHIL # BLD AUTO: 0.19 THOUSAND/ΜL (ref 0–0.61)
EOSINOPHIL NFR BLD AUTO: 5 % (ref 0–6)
ERYTHROCYTE [DISTWIDTH] IN BLOOD BY AUTOMATED COUNT: 12.7 % (ref 11.6–15.1)
GFR SERPL CREATININE-BSD FRML MDRD: 58 ML/MIN/1.73SQ M
GLUCOSE P FAST SERPL-MCNC: 72 MG/DL (ref 65–99)
HCT VFR BLD AUTO: 40.6 % (ref 34.8–46.1)
HGB BLD-MCNC: 13.3 G/DL (ref 11.5–15.4)
IMM GRANULOCYTES # BLD AUTO: 0.02 THOUSAND/UL (ref 0–0.2)
IMM GRANULOCYTES NFR BLD AUTO: 1 % (ref 0–2)
LYMPHOCYTES # BLD AUTO: 1.02 THOUSANDS/ΜL (ref 0.6–4.47)
LYMPHOCYTES NFR BLD AUTO: 27 % (ref 14–44)
MCH RBC QN AUTO: 29.8 PG (ref 26.8–34.3)
MCHC RBC AUTO-ENTMCNC: 32.8 G/DL (ref 31.4–37.4)
MCV RBC AUTO: 91 FL (ref 82–98)
MONOCYTES # BLD AUTO: 0.41 THOUSAND/ΜL (ref 0.17–1.22)
MONOCYTES NFR BLD AUTO: 11 % (ref 4–12)
NEUTROPHILS # BLD AUTO: 2.08 THOUSANDS/ΜL (ref 1.85–7.62)
NEUTS SEG NFR BLD AUTO: 55 % (ref 43–75)
NRBC BLD AUTO-RTO: 0 /100 WBCS
PLATELET # BLD AUTO: 194 THOUSANDS/UL (ref 149–390)
PMV BLD AUTO: 9.6 FL (ref 8.9–12.7)
POTASSIUM SERPL-SCNC: 4.3 MMOL/L (ref 3.5–5.3)
PROT SERPL-MCNC: 7 G/DL (ref 6.4–8.4)
RBC # BLD AUTO: 4.47 MILLION/UL (ref 3.81–5.12)
SODIUM SERPL-SCNC: 136 MMOL/L (ref 135–147)
T3 SERPL-MCNC: 0.9 NG/ML (ref 0.9–1.8)
T4 FREE SERPL-MCNC: 0.69 NG/DL (ref 0.61–1.12)
TSH SERPL DL<=0.05 MIU/L-ACNC: 3.36 UIU/ML (ref 0.45–4.5)
WBC # BLD AUTO: 3.76 THOUSAND/UL (ref 4.31–10.16)

## 2024-12-07 PROCEDURE — 86039 ANTINUCLEAR ANTIBODIES (ANA): CPT

## 2024-12-07 PROCEDURE — 84480 ASSAY TRIIODOTHYRONINE (T3): CPT

## 2024-12-07 PROCEDURE — 84439 ASSAY OF FREE THYROXINE: CPT

## 2024-12-07 PROCEDURE — 85025 COMPLETE CBC W/AUTO DIFF WBC: CPT | Performed by: INTERNAL MEDICINE

## 2024-12-07 PROCEDURE — 36415 COLL VENOUS BLD VENIPUNCTURE: CPT

## 2024-12-07 PROCEDURE — 86038 ANTINUCLEAR ANTIBODIES: CPT

## 2024-12-07 PROCEDURE — 86140 C-REACTIVE PROTEIN: CPT | Performed by: INTERNAL MEDICINE

## 2024-12-07 PROCEDURE — 86235 NUCLEAR ANTIGEN ANTIBODY: CPT

## 2024-12-07 PROCEDURE — 86225 DNA ANTIBODY NATIVE: CPT

## 2024-12-07 PROCEDURE — 84443 ASSAY THYROID STIM HORMONE: CPT

## 2024-12-07 PROCEDURE — 80053 COMPREHEN METABOLIC PANEL: CPT | Performed by: INTERNAL MEDICINE

## 2024-12-08 DIAGNOSIS — N95.1 MENOPAUSAL SYMPTOMS: ICD-10-CM

## 2024-12-08 RX ORDER — ESTRADIOL 0.05 MG/D
1 PATCH TRANSDERMAL WEEKLY
Qty: 12 PATCH | Refills: 3 | Status: SHIPPED | OUTPATIENT
Start: 2024-12-08

## 2024-12-09 LAB
ANA HOMOGEN SER QL IF: NORMAL
ANA HOMOGEN TITR SER: NORMAL {TITER}
DSDNA AB SER-ACNC: <4 IU/ML (ref ?–5)
ENA RNP AB SER IA-ACNC: POSITIVE
ENA SM AB SER IA-ACNC: NEGATIVE
ENA SM+RNP IGG SER-ACNC: POSITIVE
ENA SS-A AB SER IA-ACNC: NEGATIVE
ENA SS-B AB SER IA-ACNC: NEGATIVE

## 2024-12-15 NOTE — ASSESSMENT & PLAN NOTE
Raynaud's more prominent in her toes with no digital ulcerations.  History sicca symptoms being treated symptomatically with Systane eyedrops.  She does have scleral injection despite Systane eyedrops.  Have suggested consultation with ophthalmology for consideration for prescription eyedrops.  Lupus Avise with borderline positive index, likely due to strongly positive JESSICA with no evidence for complement activation or double-stranded DNA antibodies.  All of her joint symptoms and myalgias have resolved.  Suspect postviral.  Her MyoMarker study did show a positive anti-MDA 5, with repeat negative.  Will have the patient repeat the lupus Avise after the holiday season to monitor.  There is no significant symptomatology to suggest developing, however, we will continue to follow the patient closely clinically and with lab work as ordered.  I told her to use cold protection for her Raynaud's.  No need for calcium channel blockers at this time.  Follow-up 6 months or sooner if needed.

## 2024-12-15 NOTE — ASSESSMENT & PLAN NOTE
Subacute medial elbow epicondylitis.  Referred to occupational therapy.  If symptoms persist patient will call and come in for an injection where she could consider seeing an orthopedic hand surgeon as well.  Continue to monitor.

## 2024-12-15 NOTE — ASSESSMENT & PLAN NOTE
Patient feels that hearing loss has improved following resolution of mastoiditis and otitis media.  Follow-up ENT.  Continue to monitor.

## 2024-12-15 NOTE — ASSESSMENT & PLAN NOTE
Raynaud's more prominent in her toes with no digital ulcerations.  Suggest cold protection which we discussed.  No need for calcium channel blockers at this time.  No signs of digital ulcerations.  Will continue to monitor.  Clinically with lab work as ordered.  She is to have repeat lupus Avise in view of strongly positive JESSICA.  Continue to monitor.

## 2024-12-15 NOTE — ASSESSMENT & PLAN NOTE
Sicca symptoms with more prominent dry eyes with scleral injection.  She has been using Systane.  I did suggest that she see the eye doctor for consideration of prescription eyedrops.  Rule out developing Sjogren's syndrome primary versus secondary.  Lupus Avise with borderline positive index likely due to strongly positive JESSICA with negative complement activation and negative Rivera antibody.  Sjogren's antibodies negative.  Encourage vigilant dental hygiene and follow-up with dentist every 6 months or sooner if needed.  Xerostomia is less prominent than dry eyes.  If this persists would consider PreviDent toothpaste and XyliMelts or Biotene products.  Continue to monitor.  He

## 2024-12-15 NOTE — ASSESSMENT & PLAN NOTE
Lab Results   Component Value Date    EGFR 58 12/07/2024    EGFR 67 08/08/2024    EGFR 69 06/20/2024    CREATININE 1.02 12/07/2024    CREATININE 0.90 08/08/2024    CREATININE 0.88 06/20/2024   Chronic kidney disease generally stage II.  Will try and avoid nephrotoxic agents like nonsteroidals.  Continue to monitor.  Follow-up primary care.

## 2025-01-09 ENCOUNTER — APPOINTMENT (OUTPATIENT)
Dept: LAB | Facility: AMBULARY SURGERY CENTER | Age: 65
End: 2025-01-09
Payer: COMMERCIAL

## 2025-01-09 DIAGNOSIS — M35.9 UNDIFFERENTIATED CONNECTIVE TISSUE DISEASE (HCC): ICD-10-CM

## 2025-01-09 PROCEDURE — 36415 COLL VENOUS BLD VENIPUNCTURE: CPT

## 2025-01-12 DIAGNOSIS — I73.00 RAYNAUD'S PHENOMENON WITHOUT GANGRENE: Primary | ICD-10-CM

## 2025-01-12 RX ORDER — NIFEDIPINE 30 MG/1
30 TABLET, EXTENDED RELEASE ORAL DAILY
Qty: 90 TABLET | Refills: 3 | Status: SHIPPED | OUTPATIENT
Start: 2025-01-12 | End: 2026-01-07

## 2025-01-30 ENCOUNTER — RESULTS FOLLOW-UP (OUTPATIENT)
Age: 65
End: 2025-01-30

## 2025-02-19 DIAGNOSIS — E78.2 MODERATE MIXED HYPERLIPIDEMIA NOT REQUIRING STATIN THERAPY: Primary | ICD-10-CM

## 2025-02-28 DIAGNOSIS — M35.9 UNDIFFERENTIATED CONNECTIVE TISSUE DISEASE (HCC): Primary | ICD-10-CM

## 2025-02-28 RX ORDER — HYDROXYCHLOROQUINE SULFATE 200 MG/1
200 TABLET, FILM COATED ORAL 2 TIMES DAILY WITH MEALS
Qty: 180 TABLET | Refills: 2 | Status: SHIPPED | OUTPATIENT
Start: 2025-02-28 | End: 2025-11-25

## 2025-03-01 DIAGNOSIS — M35.9 UNDIFFERENTIATED CONNECTIVE TISSUE DISEASE (HCC): ICD-10-CM

## 2025-03-01 RX ORDER — PREDNISONE 10 MG/1
20 TABLET ORAL DAILY
Qty: 120 TABLET | Refills: 1 | Status: SHIPPED | OUTPATIENT
Start: 2025-03-01 | End: 2025-06-29

## 2025-03-01 NOTE — PROGRESS NOTES
Worsening myalgias of neck and upper extremities associated with muscle stiffness and weakness of upper extremities bilaterally. No headaches. Relieved with prednisone previously only to return after d/c..

## 2025-03-14 DIAGNOSIS — H91.93 BILATERAL HEARING LOSS, UNSPECIFIED HEARING LOSS TYPE: Primary | ICD-10-CM

## 2025-03-18 ENCOUNTER — OFFICE VISIT (OUTPATIENT)
Dept: AUDIOLOGY | Age: 65
End: 2025-03-18
Payer: COMMERCIAL

## 2025-03-18 DIAGNOSIS — H90.3 SENSORY HEARING LOSS, BILATERAL: Primary | ICD-10-CM

## 2025-03-18 DIAGNOSIS — H91.93 BILATERAL HEARING LOSS, UNSPECIFIED HEARING LOSS TYPE: ICD-10-CM

## 2025-03-18 PROCEDURE — 92567 TYMPANOMETRY: CPT | Performed by: AUDIOLOGIST

## 2025-03-18 PROCEDURE — V5160 DISPENSING FEE BINAURAL: HCPCS | Performed by: AUDIOLOGIST

## 2025-03-18 PROCEDURE — V5261 HEARING AID, DIGIT, BIN, BTE: HCPCS | Performed by: AUDIOLOGIST

## 2025-03-18 PROCEDURE — 92557 COMPREHENSIVE HEARING TEST: CPT | Performed by: AUDIOLOGIST

## 2025-03-18 NOTE — PROGRESS NOTES
Diagnostic Hearing Evaluation    Name:  Josephine Holt  :  1960  Age:  64 y.o.   MRN:  [id number]  Date of Evaluation: 25     HISTORY:     Reason for visit: Difficulty Understanding    Josephine Holt is being seen today at the request of Dr. Hotl for an initial  evaluation of hearing. The patient reports  difficulty understanding speech . The patient  denies otalgia, otorrhea, dizziness, fullness, noise exposure, family history of hearing loss, and notes a positive history of tinnitus.     EVALUATION:    Otoscopic Evaluation:   Right Ear: Unremarkable, canal clear   Left Ear: Unremarkable, canal clear    Tympanometry:   Right Ear: Type A; normal middle ear pressure and static compliance    Left Ear: Type A; normal middle ear pressure and static compliance     Speech Audiometry:  Speech Reception (SRT)    Right Ear: 25 dB HL    Left Ear: 20 dB HL    Word Recognition Scores (WRS):  Right Ear: excellent (96 % correct)     Left Ear: excellent (96 % correct)    Stimuli: [id number]    Pure Tone Audiometry:  Conventional pure tone audiometry from 250 - 8000 Hz  was obtained with good reliability and revealed the following:     Right Ear: Normal sloping to moderately severe sensorineural hearing loss (SNHL)    Left Ear: Normal sloping to moderately severe sensorineural hearing loss (SNHL)     *see attached audiogram    IMPRESSIONS:   Normal to moderately severe hearing loss bilaterally.    RECOMMENDATIONS:  Annual hearing eval, Return to [facility]. for F/U, and Copy to Patient/Caregiver    PATIENT EDUCATION:   The results of today's results and recommendations were reviewed with the patient and her hearing thresholds were explained at length. Treatment options, including amplification and communication strategies, were discussed as appropriate. The patient voiced understanding of her test results. Questions were addressed and the patient was encouraged to contact our department should concerns  arise.      Moira Longoria, CCC-A  Clinical Audiologist  [facility] [place]  [address]  [place] [address]

## 2025-03-18 NOTE — PROGRESS NOTES
Hearing Aid Evaluation  Name:  Josephine Holt  :  1960  Age:  64 y.o.  MRN:  [id number]  Date of Evaluation: 25     HISTORY:    Josephine Holt was seen today for a hearing aid evaluation following her audiometric testing performed on 3/18/25. Josephine was referred by Dr. Holt.     RESULTS REVIEW:    The audiometric findings were reviewed with the patient . All of the patient's questions regarding her hearing status were addressed, and the importance of realistic expectations of hearing loss and amplification were discussed.      DEVICE REVIEW & RECOMMENDATION:     Strengths and limitations of amplification, including various hearing aid styles, technology, options, and accessories were discussed at length with patient. Hearing aids are assistive devices and are not designed to restore normal hearing. The patient was counseled on the importance of self-advocacy, motivation, as well as effective communication strategies that can be used to optimize hearing aid success. Based on the degree of her hearing loss, preferences, and lifestyle needs, the following hearing recommendations were made:    The first hearing aid recommendation is Oticon Intent 2 Mini RITE-R.  The second hearing aid recommendation is Oticon Intent 1 Mini RITE-R.    [facility] office policies regarding our hearing aid program were reviewed, including the 45 day trial period, non-refundable return fees, as well as the  warranties and service plan. Hearing aid cost, and payment, as well as insurance benefit (if applicable) were discussed with the patient.     *See attached quote sheet    DEVICE SELECTION:    At this time, patient wishes to defer the purchase of hearing aids.        Moira Longoria, CCC-A  Clinical Audiologist  [facility]  [address]  [place] [phone]

## 2025-04-03 ENCOUNTER — OFFICE VISIT (OUTPATIENT)
Dept: AUDIOLOGY | Age: 65
End: 2025-04-03

## 2025-04-03 DIAGNOSIS — H90.3 SENSORY HEARING LOSS, BILATERAL: Primary | ICD-10-CM

## 2025-04-03 NOTE — PROGRESS NOTES
Hearing Aid Fitting    Name:  Josephine Holt  :  1960  Age:  64 y.o.  MRN:  [id number]  Date of Evaluation: 25     HISTORY:    Josephine Holt was seen today for a binaural hearing aid fitting of her Oticon Intent 2  in the canal (DIO) hearing aid(s). Josephine was unaccompanied to today's visit. Hearing aid purchase is being paid by private Pay.    DEVICE INFORMATION:     Left Device Right Device   Hearing Aid Make: Oticon  Oticon    Hearing Aid Model: Intent 2 Mini RITE-R Intent 2 Mini RITE-R   Serial Number: [id number]   Repair Warranty Date: 28   Loss/Damage Warranty Status: Active  Active        Length/Output    Wax System: Pro Wax miniFIT Pro Wax miniFIT   Dome Size/Style: 10 Open 10 Open   Battery: Lithium-ion Rechargeable Lithium-ion Rechargeable      Earmold Serial Number: N/A N/A   Earmold Warranty Date:  N/A N/A    Serial Number:  [id number]    Warranty Date:  28     Accessories: N/A       DEVICE SETTINGS:    Hearing aid(s) were programmed using VAC+ fitting formula and were adjusted based on the patient's perceived comfort level. Hearing aid(s) was set to experience level 2  per patient's subjective listening preference. The patient noted good sound quality, and was happy with the overall sound quality and fit of the hearing aid(s).    DEVICE ORIENTATION:    The patient was counseled on device components and component function. Proper insertion and removal of the aid(s) was demonstrated. The patient practiced insertion and removal of the devices in the office, they demonstrated excellent ability to manipulate the hearing aids. The patient  was given the devices users manual that reviews aid usage and operation, hearing aid cleaning tools, and hearing aid carrying case.     The hearing aid warranty, including unlimited repair and a one time loss and damage per hearing aid, through the , as well as [facility]'s hearing aid  service plan, including unlimited office visits, and supplies were outlined thoroughly. The patient agreed to the terms of sale listed on the purchase agreement containing device specifications, warranties, pricing information, as well as [facility]'s 45-day trial period timeline. After this period has elapsed, hearing aids cannot be returned.       DEVICE EXPECTATIONS & USAGE:     Hearing aids are assistive devices and are not designed to restore normal hearing sensitivity. The importance of realistic expectations, especially in the presence of background noise, was emphasized. The need for daily, consistent usage (8-12 hours per day) for proper device adjustment, as well as the importance of self-advocacy and practicing effective communication strategies was outlined.     Effective communication strategies include:  1.) Maintaining face-to-face communication, allowing for speechreading of facial expressions, lips, and gestures.  2.) Reducing background noise and distance between communication partners.  3.) Having communication partners reduce their rate of speech when appropriate.  4.) Beginning conversation by getting communication partner's attention.  5.) Asking for rephrasing of missed aspects of conversation rather than asking for repetition.    RECOMMENDATIONS:  The patient demonstrated understanding of all the topics discussed. The patientis to follow-up in 2-3 weeks for a hearing aid check within the trial period as scheduled.     Moira Longoria, CCC-A  Clinical Audiologist   [facility]  [address]  [place] [address]

## 2025-04-05 ENCOUNTER — DOCUMENTATION (OUTPATIENT)
Age: 65
End: 2025-04-05

## 2025-04-17 ENCOUNTER — OFFICE VISIT (OUTPATIENT)
Dept: AUDIOLOGY | Age: 65
End: 2025-04-17

## 2025-04-17 DIAGNOSIS — H90.3 SENSORY HEARING LOSS, BILATERAL: Primary | ICD-10-CM

## 2025-04-17 NOTE — PROGRESS NOTES
Hearing Aid Visit:    Name:  Josephine Holt  :  1960  Age:  64 y.o.  MRN:  [id number]  Date of Evaluation: 25     HISTORY:    Josephine Holt was seen today (2025) for a(n) in-warranty hearing aid check of her bilateral hearing aids. Today, Josephine noted her own voice sounds loud and noted the dome is uncomfortable in her right ear.    Most recent Audiogram: 3/18/25    DEVICE INFORMATION:       Left Device Right Device   Hearing Aid Make: Oticon  Oticon    Hearing Aid Model: Intent 2 Mini RITE-R Intent 2 Mini RITE-R   Serial Number: [id number]   Repair Warranty Date: 28   Loss/Damage Warranty Status: Active  Active         Length/Output    Wax System: Pro Wax miniFIT Pro Wax miniFIT   Dome Size/Style: 8 Open 10 Open   Battery: Lithium-ion Rechargeable Lithium-ion Rechargeable       Earmold Serial Number: N/A N/A   Earmold Warranty Date:  N/A N/A    Serial Number:  [id number]    Warranty Date:  28     Accessories: N/A       ACTION/ADJUSTMENTS:    Decreased low frequency gain to help with the quality of her own voice. Changed to an 8 open on the left.     RECOMMENDATIONS:     Follow up in 3 weeks.        Moira Longoria, CCC-A  Clinical Audiologist  [facility]  [address] [place] [address]  [place] [phone]

## 2025-04-29 ENCOUNTER — DOCUMENTATION (OUTPATIENT)
Age: 65
End: 2025-04-29

## 2025-04-29 DIAGNOSIS — H66.93 ACUTE BILATERAL OTITIS MEDIA: Primary | ICD-10-CM

## 2025-04-29 RX ORDER — AMOXICILLIN 875 MG/1
875 TABLET, COATED ORAL 2 TIMES DAILY
Qty: 20 TABLET | Refills: 0 | Status: SHIPPED | OUTPATIENT
Start: 2025-04-29 | End: 2025-05-09

## 2025-05-05 ENCOUNTER — OFFICE VISIT (OUTPATIENT)
Age: 65
End: 2025-05-05
Payer: COMMERCIAL

## 2025-05-05 VITALS
DIASTOLIC BLOOD PRESSURE: 70 MMHG | HEIGHT: 65 IN | HEART RATE: 94 BPM | OXYGEN SATURATION: 95 % | BODY MASS INDEX: 28.22 KG/M2 | WEIGHT: 169.4 LBS | SYSTOLIC BLOOD PRESSURE: 130 MMHG

## 2025-05-05 DIAGNOSIS — D70.8 OTHER NEUTROPENIA (HCC): ICD-10-CM

## 2025-05-05 DIAGNOSIS — M06.09 SERONEGATIVE ARTHROPATHY OF MULTIPLE SITES (HCC): Primary | ICD-10-CM

## 2025-05-05 DIAGNOSIS — M35.00 SJOGREN'S SYNDROME WITHOUT EXTRAGLANDULAR INVOLVEMENT (HCC): ICD-10-CM

## 2025-05-05 DIAGNOSIS — M35.9 UNDIFFERENTIATED CONNECTIVE TISSUE DISEASE (HCC): ICD-10-CM

## 2025-05-05 DIAGNOSIS — N18.2 STAGE 2 CHRONIC KIDNEY DISEASE: ICD-10-CM

## 2025-05-05 DIAGNOSIS — I73.00 RAYNAUD'S PHENOMENON WITHOUT GANGRENE: ICD-10-CM

## 2025-05-05 DIAGNOSIS — G56.12 LEFT MEDIAN NERVE NEUROPATHY: ICD-10-CM

## 2025-05-05 DIAGNOSIS — G56.11 RIGHT MEDIAN NERVE NEUROPATHY: ICD-10-CM

## 2025-05-05 DIAGNOSIS — M79.89 SWELLING OF BOTH HANDS: ICD-10-CM

## 2025-05-05 PROCEDURE — 99215 OFFICE O/P EST HI 40 MIN: CPT | Performed by: INTERNAL MEDICINE

## 2025-05-05 RX ORDER — PREDNISONE 5 MG/1
TABLET ORAL
Qty: 100 TABLET | Refills: 1 | Status: SHIPPED | OUTPATIENT
Start: 2025-05-05

## 2025-05-05 NOTE — PROGRESS NOTES
Assessment/Plan:    Seronegative arthropathy of multiple sites (HCC)  More recent onset of synovitis hands and wrist with carpal tunnel symptoms left greater than right likely secondary to soft tissue prominence.  Rule out seronegative rheumatoid arthritis.  History high titer JESSICA with no evidence for complement activation or double-stranded DNA antibodies.  Rivera antibody negative.  She did have low titer positive you 1 RNP and RNP 70.  Scleroderma and Mckenna-1 antibodies were negative on lupus Avise from January of this year.  Chronic sicca symptoms treated symptomatically.  Raynaud's more prominent currently in her toes and her fingers.  Rule out developing connective tissue disease.  Patient referred for musculoskeletal ultrasound bilateral hands and wrist to evaluate synovitis.  I did give her a steroid taper which she can start once the ultrasound is completed.  Depending on the results of her ultrasound, she may be a candidate for Plaquenil in view of its excellent efficacy and safety profile.  Is also a reasonable choice, particularly if she is developing a connective tissue disease.  Will repeat lupus Avise with other lab work ordered prior to her next office visit in 2 to 3 months.  Continue to monitor closely.    Undifferentiated connective tissue disease (HCC)  History of strongly positive JESSICA with negative double-stranded DNA antibodies and negative complement activation, with borderline positive Avise index suggesting likelihood for lupus.  U1 RNP and RNP 70 low titer positive.  She does have history of Raynaud's and sicca symptoms and currently does have evidence of synovitis hands and wrists with slight puffiness in her hands.  Rule out developing connective tissue disease.  Her scleroderma antibodies and Mckenna 1 antibody were negative on the last lupus Avise.  Will plan to repeat the lupus Avise prior to the next office visit in 2 to 3 months.  She is to have a musculoskeletal ultrasound of her hands and  wrist to further evaluate for inflammatory arthritis.  She will start a prednisone taper after she has had the ultrasound, for symptomatic relief, with consideration for Plaquenil depending on results of her musculoskeletal ultrasound and her clinical course.  Discussed in detail with patient.  Patient reassured.    Sjogren's syndrome without extraglandular involvement (HCC)  Sicca symptoms with more prominent dry eyes with scleral injection.  She had been using Systane, but after evaluation with her eye doctor, she was told to try another over-the-counter eyedrop use as needed.  The eye doctor did not recommend prescription eyedrops at this time but will continue to monitor. Rule out developing Sjogren's syndrome primary versus secondary.  Lupus Avise with borderline positive index likely due to strongly positive JESSICA with negative complement activation and negative Rivera antibody.  Sjogren's antibodies negative.  Encourage vigilant dental hygiene and follow-up with dentist every 6 months or sooner if needed.  Xerostomia is less prominent than dry eyes.  If this persists would consider PreviDent toothpaste and XyliMelts or Biotene products.  Continue to monitor.  Lupus Avise to be repeated prior to her next office visit in 2 to 3 months.  Continue to monitor.    Raynaud's phenomenon  Raynaud's more prominent in her toes with no digital ulcerations.  Continue cold protection which has been of benefit for her.  No need for calcium channel blockers at this time.  No signs of digital ulcerations.  Will continue to monitor clinically and with lab work as ordered.  She is to have repeat lupus Avise in view of strongly positive JESSICA and low positive index with negative complement activation and negative double-stranded DNA antibodies.  Rivera antibody negative as well.  She did have low titer positive U1 RNP and RNP 70, however, scleroderma antibodies and Mckenna 1 antibody negative.  Rule out developing connective tissue  disease.  Continue to monitor.    Swelling of both hands  Swelling bilateral finger joints and wrist with carpal tunnel symptoms likely secondary to joint swelling.  She does have slight puffiness to her hands.  Rule out seronegative RA versus developing connective tissue disease.  Scleroderma antibodies and Mckenna 1 antibody were negative on most recent lab work, however, U1 RNP and RNP 70 were positive.  Patient was referred for musculoskeletal ultrasound bilateral hands and wrists with plans to start a steroid taper once the ultrasound has been completed.  Will consider Plaquenil depending on results of the musculoskeletal ultrasound.  Follow-up 2 to 3 months with lab work prior to the office visit to monitor disease activity and medication toxicity.    Right median nerve neuropathy  Bilateral carpal tunnel symptoms likely secondary to wrist synovitis.  She has been referred for musculoskeletal ultrasound for further evaluation of the synovitis.  She was told to start a steroid taper once the ultrasound study was complete.  If symptoms persist despite steroids, would refer for EMG studies.  Continue to monitor.  She can use wrist splints symptomatically if needed.    Other neutropenia (HCC)  Chronic neutropenia with most recent white blood cell count 3.76 with absolute neutrophil count 2.08.  No history of recurrent fevers or recurrent infections.  Patient may have developing connective tissue disease with neutropenia related.  Continue to monitor closely.    Stage 2 chronic kidney disease  Lab Results   Component Value Date    EGFR 64 06/23/2025    EGFR 58 12/07/2024    EGFR 67 08/08/2024    CREATININE 0.93 06/23/2025    CREATININE 1.02 12/07/2024    CREATININE 0.90 08/08/2024   Chronic kidney disease stage II with fluctuating GFR's questionably related to hydration.  Avoid excessive use of nonsteroidals in view of nephrotoxicity.  Continue to monitor.         Problem List Items Addressed This Visit       Raynaud's  phenomenon    Raynaud's more prominent in her toes with no digital ulcerations.  Continue cold protection which has been of benefit for her.  No need for calcium channel blockers at this time.  No signs of digital ulcerations.  Will continue to monitor clinically and with lab work as ordered.  She is to have repeat lupus Avise in view of strongly positive JESSICA and low positive index with negative complement activation and negative double-stranded DNA antibodies.  Rivera antibody negative as well.  She did have low titer positive U1 RNP and RNP 70, however, scleroderma antibodies and Mckenna 1 antibody negative.  Rule out developing connective tissue disease.  Continue to monitor.         Undifferentiated connective tissue disease (HCC)    History of strongly positive JESSICA with negative double-stranded DNA antibodies and negative complement activation, with borderline positive Avise index suggesting likelihood for lupus.  U1 RNP and RNP 70 low titer positive.  She does have history of Raynaud's and sicca symptoms and currently does have evidence of synovitis hands and wrists with slight puffiness in her hands.  Rule out developing connective tissue disease.  Her scleroderma antibodies and Mckenna 1 antibody were negative on the last lupus Avise.  Will plan to repeat the lupus Avise prior to the next office visit in 2 to 3 months.  She is to have a musculoskeletal ultrasound of her hands and wrist to further evaluate for inflammatory arthritis.  She will start a prednisone taper after she has had the ultrasound, for symptomatic relief, with consideration for Plaquenil depending on results of her musculoskeletal ultrasound and her clinical course.  Discussed in detail with patient.  Patient reassured.         Relevant Orders    US RA Hands/Wrists (Completed)    C-reactive protein (Completed)    Sedimentation rate, automated (Completed)    CBC and differential (Completed)    Comprehensive metabolic panel (Completed)    Urinalysis  with microscopic (Completed)    MISCELLANEOUS LAB TEST (Completed)    Sjogren's syndrome without extraglandular involvement (HCC)    Sicca symptoms with more prominent dry eyes with scleral injection.  She had been using Systane, but after evaluation with her eye doctor, she was told to try another over-the-counter eyedrop use as needed.  The eye doctor did not recommend prescription eyedrops at this time but will continue to monitor. Rule out developing Sjogren's syndrome primary versus secondary.  Lupus Avise with borderline positive index likely due to strongly positive JESSICA with negative complement activation and negative Rivera antibody.  Sjogren's antibodies negative.  Encourage vigilant dental hygiene and follow-up with dentist every 6 months or sooner if needed.  Xerostomia is less prominent than dry eyes.  If this persists would consider PreviDent toothpaste and XyliMelts or Biotene products.  Continue to monitor.  Lupus Avise to be repeated prior to her next office visit in 2 to 3 months.  Continue to monitor.         Relevant Medications    predniSONE 5 mg tablet    Stage 2 chronic kidney disease    Lab Results   Component Value Date    EGFR 64 06/23/2025    EGFR 58 12/07/2024    EGFR 67 08/08/2024    CREATININE 0.93 06/23/2025    CREATININE 1.02 12/07/2024    CREATININE 0.90 08/08/2024   Chronic kidney disease stage II with fluctuating GFR's questionably related to hydration.  Avoid excessive use of nonsteroidals in view of nephrotoxicity.  Continue to monitor.         Seronegative arthropathy of multiple sites (HCC) - Primary    More recent onset of synovitis hands and wrist with carpal tunnel symptoms left greater than right likely secondary to soft tissue prominence.  Rule out seronegative rheumatoid arthritis.  History high titer JESSICA with no evidence for complement activation or double-stranded DNA antibodies.  Rivera antibody negative.  She did have low titer positive you 1 RNP and RNP 70.  Scleroderma  and Mckenna-1 antibodies were negative on lupus Avise from January of this year.  Chronic sicca symptoms treated symptomatically.  Raynaud's more prominent currently in her toes and her fingers.  Rule out developing connective tissue disease.  Patient referred for musculoskeletal ultrasound bilateral hands and wrist to evaluate synovitis.  I did give her a steroid taper which she can start once the ultrasound is completed.  Depending on the results of her ultrasound, she may be a candidate for Plaquenil in view of its excellent efficacy and safety profile.  Is also a reasonable choice, particularly if she is developing a connective tissue disease.  Will repeat lupus Avise with other lab work ordered prior to her next office visit in 2 to 3 months.  Continue to monitor closely.         Relevant Medications    predniSONE 5 mg tablet    Other Relevant Orders    US RA Hands/Wrists (Completed)    C-reactive protein (Completed)    Sedimentation rate, automated (Completed)    CBC and differential (Completed)    Comprehensive metabolic panel (Completed)    Urinalysis with microscopic (Completed)    Swelling of both hands    Swelling bilateral finger joints and wrist with carpal tunnel symptoms likely secondary to joint swelling.  She does have slight puffiness to her hands.  Rule out seronegative RA versus developing connective tissue disease.  Scleroderma antibodies and Mckenna 1 antibody were negative on most recent lab work, however, U1 RNP and RNP 70 were positive.  Patient was referred for musculoskeletal ultrasound bilateral hands and wrists with plans to start a steroid taper once the ultrasound has been completed.  Will consider Plaquenil depending on results of the musculoskeletal ultrasound.  Follow-up 2 to 3 months with lab work prior to the office visit to monitor disease activity and medication toxicity.         Relevant Medications    predniSONE 5 mg tablet    Other Relevant Orders    US RA Hands/Wrists (Completed)     Left median nerve neuropathy    Right median nerve neuropathy    Bilateral carpal tunnel symptoms likely secondary to wrist synovitis.  She has been referred for musculoskeletal ultrasound for further evaluation of the synovitis.  She was told to start a steroid taper once the ultrasound study was complete.  If symptoms persist despite steroids, would refer for EMG studies.  Continue to monitor.  She can use wrist splints symptomatically if needed.         Other neutropenia (HCC)    Chronic neutropenia with most recent white blood cell count 3.76 with absolute neutrophil count 2.08.  No history of recurrent fevers or recurrent infections.  Patient may have developing connective tissue disease with neutropenia related.  Continue to monitor closely.         Relevant Medications    predniSONE 5 mg tablet           Reviewed records, labs, and imaging with the patient in detail.  Counseled patient.  Discussion regarding my findings and recommendations.  Office visit with documentation 45 min.    Subjective:      Patient ID: Josephine Holt is a 65 y.o. female.    Patient with symptoms which began the week prior to her hospital admission 06/02/2024 for a severe intractable migraine associated with nausea and vomiting, photophobia, and low-grade temperature. Prior to this, she experienced unusual body aches, particularly in her arms, shoulders, and hips, which were alleviated by Advil. However, later that week, the pain escalated, prompting her to take oxycodone at home. She was treated with Nurtec while hospitalized for her migraine. She was found to have positive JESSICA prompting her spouse, her primary care physician, to recommend a rheumatology consultation. Her migraines are typically without an aura, and she has not experienced severe migraines since her menopause. However, she experiences headaches 3 to 4 times a month, which are less debilitating, controlled with medication. She was seen by neurology during her  hospital stay, who recommended continuation of oral Imitrex for migraines.  In early May 2024, she returned from a trip to [place], during which time she had a transient rash with bodyaches and chills, was ultimately diagnosed with bilateral otitis media and treated with amoxicillin without benefit.  She subsequently was given Ceftin which did improve her symptoms, with the exception of hearing loss which has persisted. During her hospital stay, she was diagnosed with chronic right mastoiditis, seen on CT of the facial bones.  CT of the head was significant for right greater than left mastoid effusions, with near total opacification of the right mastoid air cells.  She was noted to have mildly elevated LFTs and thrombocytopenia at admission, and she was empirically treated with doxycycline due to concern for tickborne illness during her hospital stay.  She had been treated with Synthroid for hypothyroidism, however, TSH was significantly suppressed, and Synthroid was discontinued.  She was also noted to have hyponatremia felt to be secondary to Wellbutrin.  She had significant swelling in her hands and feet while in the hospital, which subsequently resolved, however the past few months she has noted hand arthralgias with associated swelling in her fingers causing her rings to feel tight, and in fact she had to have her ring cut off.  She describes her fingers like sausages and notes numbness and tingling in both hands her left greater than right.  She also complains of arm pain.  She has been taking Advil 600 mg 3 times daily for symptomatic relief.  She denies frequent oral sores, hair loss, bald spots, chest pain, or shortness of breath. She experiences dry eyes and more recently dry mouth, for which she uses artificial tears and drinks a lot of water and occasionally uses throat lozenges.  She recently saw the eye doctor who told her to try another over-the-counter eyedrop then Systane and use as needed.  Over  the past 2 to 3 years, she has experienced cavities and root canals. She denies any history of seizures or documented tick bites. She has chronic constipation, for which she takes magnesium.  She had 1 episode of urinary incontinence while vomiting.  Her sleep is satisfactory.  She does note persistence of right elbow pain.    The patient has history of Raynaud's for at least 5 years, with no history of digital ulcerations.  More recently, however, she has noted Raynaud's to be more prominent in her toes.  She uses cold protection and gloves for Raynaud's.     The patient has history of temporary hearing loss treated with steroids with resolution of few years ago with a similar history a few years later.  She has tinnitus with her hearing loss.  She has a history of obstructive sleep apnea but had been intolerant to the mask and is using an oral appliance, however, it exacerbates jaw pain.  She does have a history of depression treated with Wellbutrin.  She had 1 episode of alopecia areata in September 2022 which resolved with clobetasol.  She believes vitamin D has helped as well.  She also had periorbital edema after a COVID-vaccine.  · She is a retired RN.    Lab work dated 1/9/2025 significant for lupus Avise with positive index of +0.4.  JESSICA positive in a dilution of 2560 in a speckled pattern with positive IgG JESSICA.  Double-stranded DNA antibodies and complement activation negative.  U1 RNP and RNP 70 IgG positive in low titers.  Review of lab work dated 12/7/2024 significant for JESSICA positive in a dilution of 1280 in a speckled pattern with negative Rivera antibody, Rivera/RNP antibody, RNP antibody, double-stranded DNA antibodies, and Sjogren's antibodies.  Creatinine 1.02 with estimated GFR 58.  White blood cell count 3.76 with absolute neutrophil count 2.08.  CRP 3.2.  Review of lab work dated 8/8/2024 significant for hemoglobin A1c 5.6.  Repeat Anti-MDA-5-Ab less than 20 negative.  BUN 28.  Creatinine 0.90  with estimated GFR 67.  Glucose 54.  Total cholesterol 202.  Triglyceride 304.  HDL 48.  LDL 93.  Review of lab work dated 6/20/2024 significant for CRP 3.4.  White blood cell count 2.91 with absolute neutrophil count 1.36.  Platelet count 209.  C3 and C4 complement negative.  Scleroderma antibodies negative.  Toan marker/myositis specific antibodies with positive MDA-5 Ab.  U1 RNP positive at 116.  U2 RNP weakly positive.  ANCA negative.  Lupus Avise with borderline positive index of +0.5.  JESSICA positive by IgG and hep 2 antibody JESSICA positive in a dilution of 1280 in a speckled pattern.  Double-stranded DNA antibodies and complement activation all negative.          Allergies  Allergies   Allergen Reactions    Pfizer Covid-19 Vac-Hugo 5-11y [Covid-19 Mrna Vacc (Moderna)] Rash     Pfizer- swollen eyes and body rash    Other        Home Medications    Current Outpatient Medications:     buPROPion (WELLBUTRIN XL) 300 mg 24 hr tablet, Take 1 tablet (300 mg total) by mouth every morning, Disp: 90 tablet, Rfl: 0    Citrus Bergamot 650 MG TABS, Take 1 tablet by mouth in the morning, Disp: 180 tablet, Rfl: 1    cyclobenzaprine (FLEXERIL) 10 mg tablet, Take 1 tablet (10 mg total) by mouth 3 (three) times a day as needed for muscle spasms, Disp: 60 tablet, Rfl: 1    estradiol (Climara) 0.05 mg/24 hr, Place 1 patch on the skin over 7 days once a week, Disp: 12 patch, Rfl: 3    hydroxychloroquine (PLAQUENIL) 200 mg tablet, Take 1 tablet (200 mg total) by mouth 2 (two) times a day with meals, Disp: 180 tablet, Rfl: 1    LORazepam (Ativan) 0.5 mg tablet, Take 1 tablet (0.5 mg total) by mouth every 8 (eight) hours as needed for anxiety, Disp: 90 tablet, Rfl: 1    nortriptyline (PAMELOR) 10 mg capsule, Take 1 capsule (10 mg total) by mouth daily at bedtime, Disp: 90 capsule, Rfl: 3    predniSONE 5 mg tablet, Take 2 tablets twice daily with food for 5 days then decrease to 1-1/2 tablets twice daily with food for 5 days then decrease  to 1 tablet twice daily with food for 5 days then decrease to 1-1/2 tablets with food daily thereafter, Disp: 100 tablet, Rfl: 1    Progesterone 100 MG CAPS, TAKE ONE CAPSULE BY MOUTH AT BEDTIME, Disp: 90 capsule, Rfl: 3    rimegepant sulfate (NURTEC) 75 mg TBDP, Take 1 tablet (75 mg total) by mouth daily as needed (Migraine headache), Disp: 45 tablet, Rfl: 0    SODIUM FLUORIDE, DENTAL GEL, 1.1 % CREA, Use on teeth nightly. Do not rinse., Disp: 112 g, Rfl: 5    SUMAtriptan (IMITREX) 100 mg tablet, Take 1 tablet (100 mg total) by mouth 2 (two) times a day as needed for migraine, Disp: 54 tablet, Rfl: 1    Trintellix 20 MG tablet, Take 20 mg by mouth in the morning, Disp: , Rfl:     buPROPion (WELLBUTRIN SR) 150 mg 12 hr tablet, Take 150 mg by mouth in the morning, Disp: , Rfl:     hydroxychloroquine (PLAQUENIL) 200 mg tablet, Take 1 tablet (200 mg total) by mouth 2 (two) times a day with meals, Disp: 180 tablet, Rfl: 2    NIFEdipine (PROCARDIA XL) 30 mg 24 hr tablet, Take 1 tablet (30 mg total) by mouth daily, Disp: 90 tablet, Rfl: 3    trimethobenzamide (TIGAN) 300 mg capsule, Take 1 capsule (300 mg total) by mouth 3 (three) times a day, Disp: 15 capsule, Rfl: 1    Past Medical History  Past Medical History:   Diagnosis Date    Depression     Non-ulcer dyspepsia 11/25/2015    Periorbital edema of both eyes 05/27/2021    Sleep apnea, obstructive     Undifferentiated connective tissue disease (HCC)        Past Surgical History   Past Surgical History:   Procedure Laterality Date    HYSTERECTOMY  2010    has ovaries    US GUIDANCE BREAST BIOPSY LEFT EACH ADDITIONAL Left 6/12/2023    US GUIDED BREAST BIOPSY LEFT COMPLETE Left 6/12/2023       Family History   Family History   Problem Relation Name Age of Onset    Hypertension Mother      Multiple myeloma Father      No Known Problems Sister      No Known Problems Daughter      No Known Problems Daughter      No Known Problems Maternal Grandmother      No Known Problems  "Maternal Grandfather      No Known Problems Paternal Grandmother      No Known Problems Paternal Grandfather         The following portions of the patient's history were reviewed and updated as appropriate: allergies, current medications, past family history, past medical history, past social history, past surgical history, and problem list.    Review of Systems   Constitutional:  Negative for chills.   HENT:  Positive for hearing loss (overall improved after ear infection treated, back to baseline hearing loss) and tinnitus (less prominent). Negative for ear pain and sore throat.         Dry mouth   Eyes:  Negative for pain and visual disturbance.        Dry eyes on Systane with ongoing sx's   Respiratory:  Negative for cough and shortness of breath.    Cardiovascular:  Negative for chest pain and palpitations.   Gastrointestinal:  Positive for constipation (better with magnesium). Negative for abdominal pain and vomiting.   Genitourinary:  Negative for dysuria and hematuria.   Musculoskeletal:  Positive for arthralgias and joint swelling.   Skin:  Positive for color change. Negative for rash.        Raynaud's more prominent in toes   Neurological:  Positive for numbness (Neuropathic symptoms hands left greater than right) and headaches. Negative for seizures and syncope.   All other systems reviewed and are negative.        Objective:      /70   Pulse 94   Ht 5' 4.5\" (1.638 m)   Wt 76.8 kg (169 lb 6.4 oz)   SpO2 95%   BMI 28.63 kg/m²          Physical Exam  Vitals reviewed.   Constitutional:       Appearance: Normal appearance.   HENT:      Head: Normocephalic.      Nose:      Comments: Nose and throat unremarkable.     Mouth/Throat:      Comments: Slightly prominent gums upper greater than lower.    Eyes:      Extraocular Movements: Extraocular movements intact.      Comments: Mild scleral injection bilaterally   Neck:      Comments: Without masses, thyromegaly, lymphadenopathy  Cardiovascular:      " Rate and Rhythm: Regular rhythm.   Pulmonary:      Breath sounds: Normal breath sounds.   Abdominal:      Palpations: Abdomen is soft.     Musculoskeletal:      Cervical back: Neck supple.      Comments: Neck mildly decreased lateral flexion.  Shoulders full range of motion as do elbows.  Tinel's positive right>left medial elbows.  Wrists full range of motion with bilateral wrist synovitis.  Positive Tinel's right>left wrist.  Hands squaring first carpometacarpal joints bilaterally with scattered Heberden's nodes with synovitis scattered MCP and PIP joints left>right.  Straight leg raising negative bilaterally.  Schober's negative.  No SI joint tenderness appreciated.  Hips full range of motion.  Knees full range of motion with patellofemoral crepitus.  Ankles full range of motion.  Feet rearfoot hyperpronation with midfoot cavus deformities, and very mild hallux valgus deformities with hammertoe deformities scattered.  No synovitis appreciated.  Mild Raynaud's toes with no digital ulcerations.     Skin:     General: Skin is warm.      Comments: Mild Raynaud's toes with no digital ulcerations. Periungual erythema fingers with no digital ulcerations.     Neurological:      General: No focal deficit present.               This note was written in part using the assistance of the [employer] [id number] microphone system. Those portions using this system have been dictated and not read.

## 2025-05-05 NOTE — PATIENT INSTRUCTIONS
Get the musculoskeletal ultrasound of your hands and wrists done this week.  After you complete the study, start the prednisone that I ordered which will be a tapering dose as directed.  You likely will feel significantly better within 24 to 48 hours of starting the medication.  It also should help the numbness and tingling symptoms in your hands.  Call me to let me know approximately 1 week after starting the medication how you are feeling.  I ordered lab work that I want you to get around July 1.  I will plan to see you in July to discuss potential for additional medication at that time.  The lab work does include a repeat of the lupus testing using the kit that has been drawn Monday through Thursday before noon.  I would consider treatment with Plaquenil depending on your response to prednisone.

## 2025-05-06 ENCOUNTER — HOSPITAL ENCOUNTER (OUTPATIENT)
Dept: ULTRASOUND IMAGING | Facility: HOSPITAL | Age: 65
Discharge: HOME | End: 2025-05-06
Attending: INTERNAL MEDICINE
Payer: COMMERCIAL

## 2025-05-06 DIAGNOSIS — M35.9 UNDIFFERENTIATED CONNECTIVE TISSUE DISEASE (HCC): ICD-10-CM

## 2025-05-06 DIAGNOSIS — M06.09 SERONEGATIVE ARTHROPATHY OF MULTIPLE SITES (HCC): ICD-10-CM

## 2025-05-06 DIAGNOSIS — M79.89 SWELLING OF BOTH HANDS: ICD-10-CM

## 2025-05-06 PROCEDURE — 76882 US LMTD JT/FCL EVL NVASC XTR: CPT

## 2025-05-07 ENCOUNTER — RESULTS FOLLOW-UP (OUTPATIENT)
Age: 65
End: 2025-05-07

## 2025-05-19 DIAGNOSIS — M06.09 SERONEGATIVE ARTHROPATHY OF MULTIPLE SITES (HCC): Primary | ICD-10-CM

## 2025-05-19 RX ORDER — HYDROXYCHLOROQUINE SULFATE 200 MG/1
200 TABLET, FILM COATED ORAL 2 TIMES DAILY WITH MEALS
Qty: 180 TABLET | Refills: 1 | Status: SHIPPED | OUTPATIENT
Start: 2025-05-19 | End: 2025-11-15

## 2025-05-28 ENCOUNTER — OFFICE VISIT (OUTPATIENT)
Dept: AUDIOLOGY | Age: 65
End: 2025-05-28

## 2025-05-28 DIAGNOSIS — H90.3 SENSORY HEARING LOSS, BILATERAL: Primary | ICD-10-CM

## 2025-05-28 NOTE — PROGRESS NOTES
Hearing Aid Visit:    Name:  Josephine Holt  :  1960  Age:  65 y.o.  MRN:  [id number]  Date of Evaluation: 25     HISTORY:    Josephine Holt was seen today (2025) for a(n) in-warranty hearing aid check of her bilateral hearing aids. Today, Josephine noted improved comfort in the right ear. She noted her own voice still sounds loud and noted general speech sounds tinny.     Most recent Audiogram: 3/18/25    DEVICE INFORMATION:       Left Device Right Device   Hearing Aid Make: Oticon  Oticon    Hearing Aid Model: Intent 2 Mini RITE-R Intent 2 Mini RITE-R   Serial Number: [id number]   Repair Warranty Date: 28   Loss/Damage Warranty Status: Active  Active         Length/Output    Wax System: Pro Wax miniFIT Pro Wax miniFIT   Dome Size/Style: 8 Open 10 Open   Battery: Lithium-ion Rechargeable Lithium-ion Rechargeable       [place] Serial Number: N/A N/A   [place]old Warranty Date:  N/A N/A    Serial Number:  [id number]    Warranty Date:  28     Accessories: N/A       ACTION/ADJUSTMENTS:    Decreased soft gain. Decreased high frequency gain. Patient noted improved sound quality, less tinny.    RECOMMENDATIONS:      Follow in 6 months or PRN.      Moira Longoria, CCC-A  Clinical Audiologist  [facility]  [facility] [address]  [place] [address]

## 2025-06-23 ENCOUNTER — APPOINTMENT (OUTPATIENT)
Dept: LAB | Facility: AMBULARY SURGERY CENTER | Age: 65
End: 2025-06-23
Payer: COMMERCIAL

## 2025-06-23 DIAGNOSIS — Z00.8 HEALTH EXAMINATION IN POPULATION SURVEYS: ICD-10-CM

## 2025-06-23 DIAGNOSIS — M35.9 UNDIFFERENTIATED CONNECTIVE TISSUE DISEASE (HCC): ICD-10-CM

## 2025-06-23 DIAGNOSIS — M06.09 SERONEGATIVE ARTHROPATHY OF MULTIPLE SITES (HCC): ICD-10-CM

## 2025-06-23 LAB
ALBUMIN SERPL BCG-MCNC: 4.4 G/DL (ref 3.5–5)
ALP SERPL-CCNC: 56 U/L (ref 34–104)
ALT SERPL W P-5'-P-CCNC: 24 U/L (ref 7–52)
ANION GAP SERPL CALCULATED.3IONS-SCNC: 10 MMOL/L (ref 4–13)
AST SERPL W P-5'-P-CCNC: 23 U/L (ref 13–39)
BACTERIA UR QL AUTO: ABNORMAL /HPF
BASOPHILS # BLD AUTO: 0.05 THOUSANDS/ÂΜL (ref 0–0.1)
BASOPHILS NFR BLD AUTO: 1 % (ref 0–1)
BILIRUB SERPL-MCNC: 0.48 MG/DL (ref 0.2–1)
BILIRUB UR QL STRIP: NEGATIVE
BUN SERPL-MCNC: 21 MG/DL (ref 5–25)
CALCIUM SERPL-MCNC: 9.4 MG/DL (ref 8.4–10.2)
CHLORIDE SERPL-SCNC: 99 MMOL/L (ref 96–108)
CHOLEST SERPL-MCNC: 222 MG/DL (ref ?–200)
CLARITY UR: CLEAR
CO2 SERPL-SCNC: 27 MMOL/L (ref 21–32)
COLOR UR: ABNORMAL
CREAT SERPL-MCNC: 0.93 MG/DL (ref 0.6–1.3)
CRP SERPL QL: 3.2 MG/L
EOSINOPHIL # BLD AUTO: 0.13 THOUSAND/ÂΜL (ref 0–0.61)
EOSINOPHIL NFR BLD AUTO: 2 % (ref 0–6)
ERYTHROCYTE [DISTWIDTH] IN BLOOD BY AUTOMATED COUNT: 12.5 % (ref 11.6–15.1)
ERYTHROCYTE [SEDIMENTATION RATE] IN BLOOD: 17 MM/HOUR (ref 0–29)
EST. AVERAGE GLUCOSE BLD GHB EST-MCNC: 120 MG/DL
GFR SERPL CREATININE-BSD FRML MDRD: 64 ML/MIN/1.73SQ M
GLUCOSE P FAST SERPL-MCNC: 85 MG/DL (ref 65–99)
GLUCOSE UR STRIP-MCNC: NEGATIVE MG/DL
HBA1C MFR BLD: 5.8 %
HCT VFR BLD AUTO: 39.3 % (ref 34.8–46.1)
HDLC SERPL-MCNC: 65 MG/DL
HGB BLD-MCNC: 13 G/DL (ref 11.5–15.4)
HGB UR QL STRIP.AUTO: NEGATIVE
IMM GRANULOCYTES # BLD AUTO: 0.06 THOUSAND/UL (ref 0–0.2)
IMM GRANULOCYTES NFR BLD AUTO: 1 % (ref 0–2)
KETONES UR STRIP-MCNC: NEGATIVE MG/DL
LDLC SERPL CALC-MCNC: 120 MG/DL (ref 0–100)
LEUKOCYTE ESTERASE UR QL STRIP: ABNORMAL
LYMPHOCYTES # BLD AUTO: 0.82 THOUSANDS/ÂΜL (ref 0.6–4.47)
LYMPHOCYTES NFR BLD AUTO: 10 % (ref 14–44)
MCH RBC QN AUTO: 29.9 PG (ref 26.8–34.3)
MCHC RBC AUTO-ENTMCNC: 33.1 G/DL (ref 31.4–37.4)
MCV RBC AUTO: 90 FL (ref 82–98)
MONOCYTES # BLD AUTO: 0.42 THOUSAND/ÂΜL (ref 0.17–1.22)
MONOCYTES NFR BLD AUTO: 5 % (ref 4–12)
NEUTROPHILS # BLD AUTO: 6.82 THOUSANDS/ÂΜL (ref 1.85–7.62)
NEUTS SEG NFR BLD AUTO: 81 % (ref 43–75)
NITRITE UR QL STRIP: NEGATIVE
NON-SQ EPI CELLS URNS QL MICRO: ABNORMAL /HPF
NONHDLC SERPL-MCNC: 157 MG/DL
NRBC BLD AUTO-RTO: 0 /100 WBCS
PH UR STRIP.AUTO: 7.5 [PH]
PLATELET # BLD AUTO: 195 THOUSANDS/UL (ref 149–390)
PMV BLD AUTO: 9.8 FL (ref 8.9–12.7)
POTASSIUM SERPL-SCNC: 4.3 MMOL/L (ref 3.5–5.3)
PROT SERPL-MCNC: 7.1 G/DL (ref 6.4–8.4)
PROT UR STRIP-MCNC: NEGATIVE MG/DL
RBC # BLD AUTO: 4.35 MILLION/UL (ref 3.81–5.12)
RBC #/AREA URNS AUTO: ABNORMAL /HPF
SODIUM SERPL-SCNC: 136 MMOL/L (ref 135–147)
SP GR UR STRIP.AUTO: 1.01 (ref 1–1.03)
TRIGL SERPL-MCNC: 183 MG/DL (ref ?–150)
UROBILINOGEN UR STRIP-ACNC: <2 MG/DL
WBC # BLD AUTO: 8.3 THOUSAND/UL (ref 4.31–10.16)
WBC #/AREA URNS AUTO: ABNORMAL /HPF

## 2025-06-23 PROCEDURE — 85025 COMPLETE CBC W/AUTO DIFF WBC: CPT

## 2025-06-23 PROCEDURE — 86140 C-REACTIVE PROTEIN: CPT

## 2025-06-23 PROCEDURE — 36415 COLL VENOUS BLD VENIPUNCTURE: CPT

## 2025-06-23 PROCEDURE — 80061 LIPID PANEL: CPT

## 2025-06-23 PROCEDURE — 85652 RBC SED RATE AUTOMATED: CPT

## 2025-06-23 PROCEDURE — 83036 HEMOGLOBIN GLYCOSYLATED A1C: CPT

## 2025-06-23 PROCEDURE — 81001 URINALYSIS AUTO W/SCOPE: CPT

## 2025-06-23 PROCEDURE — 80053 COMPREHEN METABOLIC PANEL: CPT

## 2025-06-25 PROBLEM — D70.8 OTHER NEUTROPENIA (HCC): Status: ACTIVE | Noted: 2025-06-25

## 2025-06-25 NOTE — ASSESSMENT & PLAN NOTE
Lab Results   Component Value Date    EGFR 64 06/23/2025    EGFR 58 12/07/2024    EGFR 67 08/08/2024    CREATININE 0.93 06/23/2025    CREATININE 1.02 12/07/2024    CREATININE 0.90 08/08/2024   Chronic kidney disease stage II with fluctuating GFR's questionably related to hydration.  Avoid excessive use of nonsteroidals in view of nephrotoxicity.  Continue to monitor.

## 2025-06-25 NOTE — ASSESSMENT & PLAN NOTE
More recent onset of synovitis hands and wrist with carpal tunnel symptoms left greater than right likely secondary to soft tissue prominence.  Rule out seronegative rheumatoid arthritis.  History high titer JESSICA with no evidence for complement activation or double-stranded DNA antibodies.  Rivera antibody negative.  She did have low titer positive you 1 RNP and RNP 70.  Scleroderma and Mckenna-1 antibodies were negative on lupus Avise from January of this year.  Chronic sicca symptoms treated symptomatically.  Raynaud's more prominent currently in her toes and her fingers.  Rule out developing connective tissue disease.  Patient referred for musculoskeletal ultrasound bilateral hands and wrist to evaluate synovitis.  I did give her a steroid taper which she can start once the ultrasound is completed.  Depending on the results of her ultrasound, she may be a candidate for Plaquenil in view of its excellent efficacy and safety profile.  Is also a reasonable choice, particularly if she is developing a connective tissue disease.  Will repeat lupus Avise with other lab work ordered prior to her next office visit in 2 to 3 months.  Continue to monitor closely.

## 2025-06-25 NOTE — ASSESSMENT & PLAN NOTE
Chronic neutropenia with most recent white blood cell count 3.76 with absolute neutrophil count 2.08.  No history of recurrent fevers or recurrent infections.  Patient may have developing connective tissue disease with neutropenia related.  Continue to monitor closely.

## 2025-06-25 NOTE — ASSESSMENT & PLAN NOTE
Sicca symptoms with more prominent dry eyes with scleral injection.  She had been using Systane, but after evaluation with her eye doctor, she was told to try another over-the-counter eyedrop use as needed.  The eye doctor did not recommend prescription eyedrops at this time but will continue to monitor. Rule out developing Sjogren's syndrome primary versus secondary.  Lupus Avise with borderline positive index likely due to strongly positive JESSICA with negative complement activation and negative Rivera antibody.  Sjogren's antibodies negative.  Encourage vigilant dental hygiene and follow-up with dentist every 6 months or sooner if needed.  Xerostomia is less prominent than dry eyes.  If this persists would consider PreviDent toothpaste and XyliMelts or Biotene products.  Continue to monitor.  Lupus Avise to be repeated prior to her next office visit in 2 to 3 months.  Continue to monitor.

## 2025-06-25 NOTE — ASSESSMENT & PLAN NOTE
Raynaud's more prominent in her toes with no digital ulcerations.  Continue cold protection which has been of benefit for her.  No need for calcium channel blockers at this time.  No signs of digital ulcerations.  Will continue to monitor clinically and with lab work as ordered.  She is to have repeat lupus Avise in view of strongly positive JESSICA and low positive index with negative complement activation and negative double-stranded DNA antibodies.  Rivera antibody negative as well.  She did have low titer positive U1 RNP and RNP 70, however, scleroderma antibodies and Mckenna 1 antibody negative.  Rule out developing connective tissue disease.  Continue to monitor.

## 2025-06-25 NOTE — ASSESSMENT & PLAN NOTE
Bilateral carpal tunnel symptoms likely secondary to wrist synovitis.  She has been referred for musculoskeletal ultrasound for further evaluation of the synovitis.  She was told to start a steroid taper once the ultrasound study was complete.  If symptoms persist despite steroids, would refer for EMG studies.  Continue to monitor.  She can use wrist splints symptomatically if needed.

## 2025-06-25 NOTE — ASSESSMENT & PLAN NOTE
Swelling bilateral finger joints and wrist with carpal tunnel symptoms likely secondary to joint swelling.  She does have slight puffiness to her hands.  Rule out seronegative RA versus developing connective tissue disease.  Scleroderma antibodies and Mckenna 1 antibody were negative on most recent lab work, however, U1 RNP and RNP 70 were positive.  Patient was referred for musculoskeletal ultrasound bilateral hands and wrists with plans to start a steroid taper once the ultrasound has been completed.  Will consider Plaquenil depending on results of the musculoskeletal ultrasound.  Follow-up 2 to 3 months with lab work prior to the office visit to monitor disease activity and medication toxicity.

## 2025-06-25 NOTE — ASSESSMENT & PLAN NOTE
History of strongly positive JESSICA with negative double-stranded DNA antibodies and negative complement activation, with borderline positive Avise index suggesting likelihood for lupus.  U1 RNP and RNP 70 low titer positive.  She does have history of Raynaud's and sicca symptoms and currently does have evidence of synovitis hands and wrists with slight puffiness in her hands.  Rule out developing connective tissue disease.  Her scleroderma antibodies and Mckenna 1 antibody were negative on the last lupus Avise.  Will plan to repeat the lupus Avise prior to the next office visit in 2 to 3 months.  She is to have a musculoskeletal ultrasound of her hands and wrist to further evaluate for inflammatory arthritis.  She will start a prednisone taper after she has had the ultrasound, for symptomatic relief, with consideration for Plaquenil depending on results of her musculoskeletal ultrasound and her clinical course.  Discussed in detail with patient.  Patient reassured.

## 2025-07-01 DIAGNOSIS — N95.1 MENOPAUSAL SYMPTOMS: ICD-10-CM

## 2025-07-03 ENCOUNTER — PATIENT MESSAGE (OUTPATIENT)
Age: 65
End: 2025-07-03

## 2025-07-03 RX ORDER — PROGESTERONE 100 MG/1
1 CAPSULE ORAL
Qty: 90 CAPSULE | Refills: 3 | Status: SHIPPED | OUTPATIENT
Start: 2025-07-03

## 2025-07-23 ENCOUNTER — TELEPHONE (OUTPATIENT)
Age: 65
End: 2025-07-23

## 2025-07-29 ENCOUNTER — OFFICE VISIT (OUTPATIENT)
Age: 65
End: 2025-07-29
Payer: COMMERCIAL

## 2025-07-29 VITALS
DIASTOLIC BLOOD PRESSURE: 78 MMHG | BODY MASS INDEX: 28.38 KG/M2 | HEIGHT: 64 IN | HEART RATE: 92 BPM | WEIGHT: 166.2 LBS | SYSTOLIC BLOOD PRESSURE: 140 MMHG | TEMPERATURE: 97.6 F | OXYGEN SATURATION: 98 %

## 2025-07-29 DIAGNOSIS — M79.89 SWELLING OF BOTH HANDS: ICD-10-CM

## 2025-07-29 DIAGNOSIS — M35.9 UNDIFFERENTIATED CONNECTIVE TISSUE DISEASE (HCC): Primary | ICD-10-CM

## 2025-07-29 DIAGNOSIS — M06.09 SERONEGATIVE ARTHROPATHY OF MULTIPLE SITES (HCC): ICD-10-CM

## 2025-07-29 DIAGNOSIS — M35.00 SJOGREN'S SYNDROME WITHOUT EXTRAGLANDULAR INVOLVEMENT (HCC): ICD-10-CM

## 2025-07-29 DIAGNOSIS — I73.00 RAYNAUD'S PHENOMENON WITHOUT GANGRENE: ICD-10-CM

## 2025-07-29 PROCEDURE — 99215 OFFICE O/P EST HI 40 MIN: CPT | Performed by: INTERNAL MEDICINE

## 2025-07-29 RX ORDER — SYRINGE WITH NEEDLE, 1 ML 28GX1/2"
SYRINGE, EMPTY DISPOSABLE MISCELLANEOUS
Qty: 10 EACH | Refills: 3 | Status: SHIPPED | OUTPATIENT
Start: 2025-07-29

## 2025-07-29 RX ORDER — METHOTREXATE 25 MG/ML
INJECTION, SOLUTION INTRAMUSCULAR; INTRATHECAL; INTRAVENOUS; SUBCUTANEOUS
Qty: 8 ML | Refills: 3 | Status: SHIPPED | OUTPATIENT
Start: 2025-07-29

## 2025-07-29 RX ORDER — FOLIC ACID 1 MG/1
TABLET ORAL
Qty: 90 TABLET | Refills: 1 | Status: SHIPPED | OUTPATIENT
Start: 2025-07-29

## 2025-07-29 RX ORDER — PREDNISONE 5 MG/1
TABLET ORAL
Qty: 100 TABLET | Refills: 1 | Status: SHIPPED | OUTPATIENT
Start: 2025-07-29

## 2025-07-29 RX ORDER — AMLODIPINE BESYLATE 2.5 MG/1
TABLET ORAL
Qty: 180 TABLET | Refills: 1 | Status: SHIPPED | OUTPATIENT
Start: 2025-07-29

## 2025-07-31 ENCOUNTER — APPOINTMENT (OUTPATIENT)
Dept: LAB | Facility: AMBULARY SURGERY CENTER | Age: 65
End: 2025-07-31
Payer: COMMERCIAL

## 2025-07-31 DIAGNOSIS — M06.09 SERONEGATIVE ARTHROPATHY OF MULTIPLE SITES (HCC): ICD-10-CM

## 2025-07-31 LAB
ALT SERPL W P-5'-P-CCNC: 17 U/L (ref 7–52)
AST SERPL W P-5'-P-CCNC: 20 U/L (ref 13–39)
BACTERIA UR QL AUTO: ABNORMAL /HPF
BASOPHILS # BLD AUTO: 0.04 THOUSANDS/ÂΜL (ref 0–0.1)
BASOPHILS NFR BLD AUTO: 1 % (ref 0–1)
BILIRUB UR QL STRIP: NEGATIVE
CLARITY UR: CLEAR
COLOR UR: ABNORMAL
CREAT SERPL-MCNC: 0.84 MG/DL (ref 0.6–1.3)
CRP SERPL QL: 4.3 MG/L
EOSINOPHIL # BLD AUTO: 0.1 THOUSAND/ÂΜL (ref 0–0.61)
EOSINOPHIL NFR BLD AUTO: 2 % (ref 0–6)
ERYTHROCYTE [DISTWIDTH] IN BLOOD BY AUTOMATED COUNT: 12.4 % (ref 11.6–15.1)
ERYTHROCYTE [SEDIMENTATION RATE] IN BLOOD: 11 MM/HOUR (ref 0–29)
GFR SERPL CREATININE-BSD FRML MDRD: 73 ML/MIN/1.73SQ M
GLUCOSE UR STRIP-MCNC: NEGATIVE MG/DL
HCT VFR BLD AUTO: 37.9 % (ref 34.8–46.1)
HGB BLD-MCNC: 12.4 G/DL (ref 11.5–15.4)
HGB UR QL STRIP.AUTO: NEGATIVE
IMM GRANULOCYTES # BLD AUTO: 0.04 THOUSAND/UL (ref 0–0.2)
IMM GRANULOCYTES NFR BLD AUTO: 1 % (ref 0–2)
KETONES UR STRIP-MCNC: NEGATIVE MG/DL
LEUKOCYTE ESTERASE UR QL STRIP: ABNORMAL
LYMPHOCYTES # BLD AUTO: 0.65 THOUSANDS/ÂΜL (ref 0.6–4.47)
LYMPHOCYTES NFR BLD AUTO: 11 % (ref 14–44)
MCH RBC QN AUTO: 29.8 PG (ref 26.8–34.3)
MCHC RBC AUTO-ENTMCNC: 32.7 G/DL (ref 31.4–37.4)
MCV RBC AUTO: 91 FL (ref 82–98)
MONOCYTES # BLD AUTO: 0.42 THOUSAND/ÂΜL (ref 0.17–1.22)
MONOCYTES NFR BLD AUTO: 7 % (ref 4–12)
NEUTROPHILS # BLD AUTO: 4.68 THOUSANDS/ÂΜL (ref 1.85–7.62)
NEUTS SEG NFR BLD AUTO: 78 % (ref 43–75)
NITRITE UR QL STRIP: NEGATIVE
NON-SQ EPI CELLS URNS QL MICRO: ABNORMAL /HPF
NRBC BLD AUTO-RTO: 0 /100 WBCS
PH UR STRIP.AUTO: 7 [PH]
PLATELET # BLD AUTO: 216 THOUSANDS/UL (ref 149–390)
PMV BLD AUTO: 9.8 FL (ref 8.9–12.7)
PROT UR STRIP-MCNC: NEGATIVE MG/DL
RBC # BLD AUTO: 4.16 MILLION/UL (ref 3.81–5.12)
RBC #/AREA URNS AUTO: ABNORMAL /HPF
SP GR UR STRIP.AUTO: 1.01 (ref 1–1.03)
UROBILINOGEN UR STRIP-ACNC: <2 MG/DL
WBC # BLD AUTO: 5.93 THOUSAND/UL (ref 4.31–10.16)
WBC #/AREA URNS AUTO: ABNORMAL /HPF

## 2025-07-31 PROCEDURE — 84460 ALANINE AMINO (ALT) (SGPT): CPT

## 2025-07-31 PROCEDURE — 81001 URINALYSIS AUTO W/SCOPE: CPT

## 2025-07-31 PROCEDURE — 85025 COMPLETE CBC W/AUTO DIFF WBC: CPT

## 2025-07-31 PROCEDURE — 82565 ASSAY OF CREATININE: CPT

## 2025-07-31 PROCEDURE — 86140 C-REACTIVE PROTEIN: CPT

## 2025-07-31 PROCEDURE — 85652 RBC SED RATE AUTOMATED: CPT

## 2025-07-31 PROCEDURE — 84450 TRANSFERASE (AST) (SGOT): CPT

## 2025-07-31 PROCEDURE — 36415 COLL VENOUS BLD VENIPUNCTURE: CPT
